# Patient Record
Sex: FEMALE | Race: WHITE
[De-identification: names, ages, dates, MRNs, and addresses within clinical notes are randomized per-mention and may not be internally consistent; named-entity substitution may affect disease eponyms.]

---

## 2017-02-01 ENCOUNTER — RECORD ABSTRACTING (OUTPATIENT)
Age: 67
End: 2017-02-01

## 2017-02-01 DIAGNOSIS — M79.7 FIBROMYALGIA: ICD-10-CM

## 2017-02-01 DIAGNOSIS — Z85.828 PERSONAL HISTORY OF OTHER MALIGNANT NEOPLASM OF SKIN: ICD-10-CM

## 2017-02-01 DIAGNOSIS — Z87.39 PERSONAL HISTORY OF OTHER DISEASES OF THE MUSCULOSKELETAL SYSTEM AND CONNECTIVE TISSUE: ICD-10-CM

## 2017-02-01 DIAGNOSIS — E11.9 TYPE 2 DIABETES MELLITUS W/OUT COMPLICATIONS: ICD-10-CM

## 2017-02-01 DIAGNOSIS — E06.3 AUTOIMMUNE THYROIDITIS: ICD-10-CM

## 2017-02-01 DIAGNOSIS — Z78.9 OTHER SPECIFIED HEALTH STATUS: ICD-10-CM

## 2017-02-01 DIAGNOSIS — Z80.3 FAMILY HISTORY OF MALIGNANT NEOPLASM OF BREAST: ICD-10-CM

## 2017-02-01 DIAGNOSIS — K21.9 GASTRO-ESOPHAGEAL REFLUX DISEASE W/OUT ESOPHAGITIS: ICD-10-CM

## 2017-02-01 DIAGNOSIS — E78.00 PURE HYPERCHOLESTEROLEMIA, UNSPECIFIED: ICD-10-CM

## 2017-03-30 ENCOUNTER — RESULT REVIEW (OUTPATIENT)
Age: 67
End: 2017-03-30

## 2017-03-30 ENCOUNTER — OUTPATIENT (OUTPATIENT)
Dept: OUTPATIENT SERVICES | Facility: HOSPITAL | Age: 67
LOS: 1 days | Discharge: HOME | End: 2017-03-30

## 2017-03-30 ENCOUNTER — APPOINTMENT (OUTPATIENT)
Dept: UROGYNECOLOGY | Facility: CLINIC | Age: 67
End: 2017-03-30

## 2017-03-30 VITALS
BODY MASS INDEX: 36.12 KG/M2 | WEIGHT: 184 LBS | SYSTOLIC BLOOD PRESSURE: 122 MMHG | DIASTOLIC BLOOD PRESSURE: 84 MMHG | HEIGHT: 60 IN

## 2017-03-30 DIAGNOSIS — E04.1 NONTOXIC SINGLE THYROID NODULE: ICD-10-CM

## 2017-03-30 DIAGNOSIS — M81.0 AGE-RELATED OSTEOPOROSIS W/OUT CURRENT PATHOLOGICAL FRACTURE: ICD-10-CM

## 2017-03-30 DIAGNOSIS — R53.83 OTHER FATIGUE: ICD-10-CM

## 2017-03-30 DIAGNOSIS — M80.88XA OTHER OSTEOPOROSIS WITH CURRENT PATHOLOGICAL FRACTURE, VERTEBRA(E), INITIAL ENCOUNTER FOR FRACTURE: ICD-10-CM

## 2017-03-30 DIAGNOSIS — N30.10 INTERSTITIAL CYSTITIS (CHRONIC) W/OUT HEMATURIA: ICD-10-CM

## 2017-03-30 DIAGNOSIS — R06.02 SHORTNESS OF BREATH: ICD-10-CM

## 2017-03-30 DIAGNOSIS — E78.00 PURE HYPERCHOLESTEROLEMIA, UNSPECIFIED: ICD-10-CM

## 2017-03-30 DIAGNOSIS — F32.9 MAJOR DEPRESSIVE DISORDER, SINGLE EPISODE, UNSPECIFIED: ICD-10-CM

## 2017-03-30 DIAGNOSIS — K58.9 IRRITABLE BOWEL SYNDROME W/OUT DIARRHEA: ICD-10-CM

## 2017-04-03 LAB
APPEARANCE UR: CLEAR
BACTERIA UR CULT: NORMAL
BACTERIA URNS QL MICRO: ABNORMAL
BILIRUB UR QL STRIP: NEGATIVE
COLOR UR: YELLOW
GLUCOSE UR STRIP-MCNC: NEGATIVE MG/DL
HGB UR QL STRIP: NEGATIVE
KETONES UR STRIP-MCNC: NEGATIVE MG/DL
NITRITE UR QL STRIP: NEGATIVE
PH UR STRIP: 5.5
PROT UR STRIP-MCNC: ABNORMAL MG/DL
SP GR UR STRIP: 1.02
URINE COMP/EPITH (NORTH): ABNORMAL
UROBILINOGEN UR STRIP-MCNC: 0.2 MG/DL
WBC URNS QL MICRO: ABNORMAL P/HPF
WBC URNS QL MICRO: NEGATIVE

## 2017-04-19 ENCOUNTER — OTHER (OUTPATIENT)
Age: 67
End: 2017-04-19

## 2017-05-09 ENCOUNTER — OUTPATIENT (OUTPATIENT)
Dept: OUTPATIENT SERVICES | Facility: HOSPITAL | Age: 67
LOS: 1 days | Discharge: HOME | End: 2017-05-09

## 2017-05-09 ENCOUNTER — APPOINTMENT (OUTPATIENT)
Dept: UROGYNECOLOGY | Facility: CLINIC | Age: 67
End: 2017-05-09

## 2017-05-09 VITALS — BODY MASS INDEX: 32.22 KG/M2 | WEIGHT: 165 LBS | DIASTOLIC BLOOD PRESSURE: 70 MMHG | SYSTOLIC BLOOD PRESSURE: 122 MMHG

## 2017-05-09 RX ORDER — CONJUGATED ESTROGENS 0.62 MG/G
0.62 CREAM VAGINAL
Qty: 1 | Refills: 0 | Status: DISCONTINUED | COMMUNITY
Start: 2017-03-30 | End: 2017-05-09

## 2017-05-09 RX ORDER — CETIRIZINE HYDROCHLORIDE 10 MG/1
10 CAPSULE, LIQUID FILLED ORAL
Refills: 0 | Status: DISCONTINUED | COMMUNITY
Start: 2017-03-30 | End: 2017-05-09

## 2017-05-19 ENCOUNTER — OUTPATIENT (OUTPATIENT)
Dept: OUTPATIENT SERVICES | Facility: HOSPITAL | Age: 67
LOS: 1 days | Discharge: HOME | End: 2017-05-19

## 2017-05-19 ENCOUNTER — APPOINTMENT (OUTPATIENT)
Dept: UROGYNECOLOGY | Facility: CLINIC | Age: 67
End: 2017-05-19

## 2017-06-28 DIAGNOSIS — R35.0 FREQUENCY OF MICTURITION: ICD-10-CM

## 2017-06-28 DIAGNOSIS — R10.2 PELVIC AND PERINEAL PAIN: ICD-10-CM

## 2017-06-28 DIAGNOSIS — Z87.440 PERSONAL HISTORY OF URINARY (TRACT) INFECTIONS: ICD-10-CM

## 2017-06-28 DIAGNOSIS — N30.10 INTERSTITIAL CYSTITIS (CHRONIC) WITHOUT HEMATURIA: ICD-10-CM

## 2017-06-28 DIAGNOSIS — N81.10 CYSTOCELE, UNSPECIFIED: ICD-10-CM

## 2017-06-30 DIAGNOSIS — N81.6 RECTOCELE: ICD-10-CM

## 2017-06-30 DIAGNOSIS — N81.84 PELVIC MUSCLE WASTING: ICD-10-CM

## 2017-06-30 DIAGNOSIS — R39.15 URGENCY OF URINATION: ICD-10-CM

## 2017-06-30 DIAGNOSIS — N81.10 CYSTOCELE, UNSPECIFIED: ICD-10-CM

## 2017-06-30 DIAGNOSIS — N32.89 OTHER SPECIFIED DISORDERS OF BLADDER: ICD-10-CM

## 2017-06-30 DIAGNOSIS — R39.198 OTHER DIFFICULTIES WITH MICTURITION: ICD-10-CM

## 2017-06-30 DIAGNOSIS — R30.9 PAINFUL MICTURITION, UNSPECIFIED: ICD-10-CM

## 2017-07-05 DIAGNOSIS — K59.00 CONSTIPATION, UNSPECIFIED: ICD-10-CM

## 2017-07-05 DIAGNOSIS — N81.10 CYSTOCELE, UNSPECIFIED: ICD-10-CM

## 2017-07-05 DIAGNOSIS — N81.6 RECTOCELE: ICD-10-CM

## 2017-07-05 DIAGNOSIS — R35.0 FREQUENCY OF MICTURITION: ICD-10-CM

## 2020-07-31 ENCOUNTER — APPOINTMENT (OUTPATIENT)
Dept: UROGYNECOLOGY | Facility: CLINIC | Age: 70
End: 2020-07-31
Payer: MEDICARE

## 2020-07-31 ENCOUNTER — OUTPATIENT (OUTPATIENT)
Dept: OUTPATIENT SERVICES | Facility: HOSPITAL | Age: 70
LOS: 1 days | Discharge: HOME | End: 2020-07-31

## 2020-07-31 VITALS — WEIGHT: 177 LBS | BODY MASS INDEX: 34.57 KG/M2 | SYSTOLIC BLOOD PRESSURE: 116 MMHG | DIASTOLIC BLOOD PRESSURE: 68 MMHG

## 2020-07-31 DIAGNOSIS — Z87.440 PERSONAL HISTORY OF URINARY (TRACT) INFECTIONS: ICD-10-CM

## 2020-07-31 DIAGNOSIS — N81.6 RECTOCELE: ICD-10-CM

## 2020-07-31 DIAGNOSIS — T81.30XA DISRUPTION OF WOUND, UNSPECIFIED, INITIAL ENCOUNTER: ICD-10-CM

## 2020-07-31 DIAGNOSIS — N95.2 POSTMENOPAUSAL ATROPHIC VAGINITIS: ICD-10-CM

## 2020-07-31 DIAGNOSIS — N81.9 FEMALE GENITAL PROLAPSE, UNSPECIFIED: ICD-10-CM

## 2020-07-31 DIAGNOSIS — C44.1122 BASAL CELL CARCINOMA OF SKIN OF RIGHT LOWER EYELID, INCLUDING CANTHUS: ICD-10-CM

## 2020-07-31 DIAGNOSIS — Z87.448 PERSONAL HISTORY OF OTHER DISEASES OF URINARY SYSTEM: ICD-10-CM

## 2020-07-31 DIAGNOSIS — R42 DIZZINESS AND GIDDINESS: ICD-10-CM

## 2020-07-31 DIAGNOSIS — C43.9 MALIGNANT MELANOMA OF SKIN, UNSPECIFIED: ICD-10-CM

## 2020-07-31 DIAGNOSIS — C44.91 BASAL CELL CARCINOMA OF SKIN, UNSPECIFIED: ICD-10-CM

## 2020-07-31 DIAGNOSIS — Z87.42 PERSONAL HISTORY OF OTHER DISEASES OF THE FEMALE GENITAL TRACT: ICD-10-CM

## 2020-07-31 DIAGNOSIS — Z87.898 PERSONAL HISTORY OF OTHER SPECIFIED CONDITIONS: ICD-10-CM

## 2020-07-31 DIAGNOSIS — Z87.19 PERSONAL HISTORY OF OTHER DISEASES OF THE DIGESTIVE SYSTEM: ICD-10-CM

## 2020-07-31 PROCEDURE — 57295 REVISE VAG GRAFT VIA VAGINA: CPT

## 2020-07-31 PROCEDURE — 99204 OFFICE O/P NEW MOD 45 MIN: CPT

## 2020-07-31 RX ORDER — DOCUSATE SODIUM 100 MG/1
100 CAPSULE ORAL
Refills: 0 | Status: DISCONTINUED | COMMUNITY
Start: 2017-03-30 | End: 2020-07-31

## 2020-07-31 RX ORDER — DENOSUMAB 60 MG/ML
60 INJECTION SUBCUTANEOUS
Refills: 0 | Status: ACTIVE | COMMUNITY

## 2020-07-31 RX ORDER — BUPROPION HYDROCHLORIDE 200 MG/1
200 TABLET, FILM COATED ORAL
Refills: 0 | Status: ACTIVE | COMMUNITY

## 2020-07-31 RX ORDER — ESTRADIOL 0.1 MG/G
0.1 CREAM VAGINAL
Qty: 1 | Refills: 3 | Status: ACTIVE | COMMUNITY
Start: 2020-07-31 | End: 1900-01-01

## 2020-07-31 RX ORDER — GABAPENTIN 100 MG/1
100 CAPSULE ORAL
Refills: 0 | Status: ACTIVE | COMMUNITY

## 2020-07-31 RX ORDER — PHENAZOPYRIDINE HYDROCHLORIDE 100 MG/1
100 TABLET ORAL
Qty: 21 | Refills: 0 | Status: DISCONTINUED | COMMUNITY
Start: 2017-05-09 | End: 2020-07-31

## 2020-07-31 RX ORDER — MULTIVIT-MIN/FOLIC/VIT K/LYCOP 400-300MCG
10 MCG TABLET ORAL
Refills: 0 | Status: ACTIVE | COMMUNITY
Start: 2017-03-30

## 2020-07-31 RX ORDER — ASPIRIN 81 MG
81 TABLET, DELAYED RELEASE (ENTERIC COATED) ORAL
Refills: 0 | Status: ACTIVE | COMMUNITY

## 2020-07-31 RX ORDER — HYDROXYCHLOROQUINE SULFATE 200 MG/1
200 TABLET ORAL
Refills: 0 | Status: ACTIVE | COMMUNITY

## 2020-07-31 RX ORDER — LEVOTHYROXINE SODIUM 50 UG/1
50 CAPSULE ORAL
Refills: 0 | Status: ACTIVE | COMMUNITY
Start: 2017-03-30

## 2020-07-31 RX ORDER — FAMOTIDINE 40 MG/1
40 TABLET, FILM COATED ORAL
Refills: 0 | Status: ACTIVE | COMMUNITY

## 2020-07-31 RX ORDER — TELMISARTAN 20 MG/1
TABLET ORAL
Refills: 0 | Status: ACTIVE | COMMUNITY

## 2020-07-31 RX ORDER — CYCLOSPORINE 0.5 MG/ML
0.05 EMULSION OPHTHALMIC
Refills: 0 | Status: DISCONTINUED | COMMUNITY
Start: 2017-03-30 | End: 2020-07-31

## 2020-07-31 RX ORDER — ATORVASTATIN CALCIUM 10 MG/1
10 TABLET, FILM COATED ORAL
Refills: 0 | Status: ACTIVE | COMMUNITY

## 2020-08-01 NOTE — COUNSELING
[FreeTextEntry1] : \par Please call the office if you have an increase of vaginal bleeding or vaginal pain\par \par Apply a pea size amount of the cream to the opening of the vagina every night for two weeks followed by three nights per week\par \par Please call my office if you have any issues with the cost or side effects of the medication.\par \par Schedule 6 week follow up visit with Dr Neil (suture extruding)

## 2020-08-01 NOTE — HISTORY OF PRESENT ILLNESS
[FreeTextEntry1] : \par Pt with pelvic floor dysfunction here for urogynecologic evaluation. She describes: \par Referring provider: Dr Palumbo\par \par Chief PFD: vaginal discharge\par \par Reports having vaginal discharge since February 2020. yellowish discharge. no vaginal pain or bleeding.  The patient reports that she knew about the suture at the cuff for years but it never caused issues. Was examined by primary gyn and was found to have exposure gortex suture at the cuff.\par \par Operative report reviewed:\par 2012: Dr Yousif vaginal hysterectomy/BS0/lysis of adhesions/uterosacral ligament suspension (with Gortex and PDS suture)/cysto\par \par Pelvic organ prolapse: s/p appy, s/p cassi, s/p prolapse surgery listed above, no bulge, denies splinting\par Stress urinary incontinence: denies\par Overactive bladder syndrome: denies\par Voiding dysfunction: no Incomplete bladder emptying, no hesitancy \par Lower urinary tract/vaginal symptoms: no recurrent UTIs per year, no hematuria, no dysuria, no bladder pain \par Fecal incontinence: denies\par Defecatory dysfunction: sausage\par Sexual dysfunction: not active\par Pelvic pain: denies\par Vaginal dryness: denies\par \par Her pelvic floor symptoms are significantly bothersome and negatively impacting her quality of life. \par \par

## 2020-08-01 NOTE — DISCUSSION/SUMMARY
[FreeTextEntry1] : \par Atrophic vaginitis-\par We reviewed the risks, benefits, alternatives and indications of local estrogen therapy and I gave her a handout that covers this information. She does opt to begin this therapy. I have given her a prescription and specific instructions on how to use the estrogen cream, applied with a finger at a low dose for urogenital atrophy.\par \par Extruding suture, first encounter-\par Using scissors , I was able to grasp the suture knot and excise the suture remnant. The patient tolerated the procedure. Silver nitrate was used to obtain  hemostasis. Bleeding precautions provided. Will return in 6 weeks for follow up. The patient voiced understanding and agrees with the plan.

## 2020-09-11 ENCOUNTER — APPOINTMENT (OUTPATIENT)
Dept: UROGYNECOLOGY | Facility: CLINIC | Age: 70
End: 2020-09-11

## 2020-10-23 ENCOUNTER — APPOINTMENT (OUTPATIENT)
Dept: UROGYNECOLOGY | Facility: CLINIC | Age: 70
End: 2020-10-23

## 2023-12-15 ENCOUNTER — INPATIENT (INPATIENT)
Facility: HOSPITAL | Age: 73
LOS: 5 days | Discharge: HOME HEALTH | DRG: 64 | End: 2023-12-21
Attending: GENERAL ACUTE CARE HOSPITAL | Admitting: GENERAL ACUTE CARE HOSPITAL
Payer: MEDICARE

## 2023-12-15 VITALS — DIASTOLIC BLOOD PRESSURE: 78 MMHG | HEART RATE: 116 BPM | SYSTOLIC BLOOD PRESSURE: 144 MMHG

## 2023-12-15 DIAGNOSIS — I63.9 CEREBRAL INFARCTION, UNSPECIFIED: ICD-10-CM

## 2023-12-15 LAB
ALBUMIN SERPL ELPH-MCNC: 3.7 G/DL — SIGNIFICANT CHANGE UP (ref 3.5–5.2)
ALBUMIN SERPL ELPH-MCNC: 3.7 G/DL — SIGNIFICANT CHANGE UP (ref 3.5–5.2)
ALP SERPL-CCNC: 146 U/L — HIGH (ref 30–115)
ALP SERPL-CCNC: 146 U/L — HIGH (ref 30–115)
ALT FLD-CCNC: 13 U/L — SIGNIFICANT CHANGE UP (ref 0–41)
ALT FLD-CCNC: 13 U/L — SIGNIFICANT CHANGE UP (ref 0–41)
ANION GAP SERPL CALC-SCNC: 8 MMOL/L — SIGNIFICANT CHANGE UP (ref 7–14)
ANION GAP SERPL CALC-SCNC: 8 MMOL/L — SIGNIFICANT CHANGE UP (ref 7–14)
AST SERPL-CCNC: 20 U/L — SIGNIFICANT CHANGE UP (ref 0–41)
AST SERPL-CCNC: 20 U/L — SIGNIFICANT CHANGE UP (ref 0–41)
BASOPHILS # BLD AUTO: 0.05 K/UL — SIGNIFICANT CHANGE UP (ref 0–0.2)
BASOPHILS # BLD AUTO: 0.05 K/UL — SIGNIFICANT CHANGE UP (ref 0–0.2)
BASOPHILS NFR BLD AUTO: 0.8 % — SIGNIFICANT CHANGE UP (ref 0–1)
BASOPHILS NFR BLD AUTO: 0.8 % — SIGNIFICANT CHANGE UP (ref 0–1)
BILIRUB SERPL-MCNC: 0.4 MG/DL — SIGNIFICANT CHANGE UP (ref 0.2–1.2)
BILIRUB SERPL-MCNC: 0.4 MG/DL — SIGNIFICANT CHANGE UP (ref 0.2–1.2)
BUN SERPL-MCNC: 25 MG/DL — HIGH (ref 10–20)
BUN SERPL-MCNC: 25 MG/DL — HIGH (ref 10–20)
CALCIUM SERPL-MCNC: 9.6 MG/DL — SIGNIFICANT CHANGE UP (ref 8.4–10.5)
CALCIUM SERPL-MCNC: 9.6 MG/DL — SIGNIFICANT CHANGE UP (ref 8.4–10.5)
CHLORIDE SERPL-SCNC: 105 MMOL/L — SIGNIFICANT CHANGE UP (ref 98–110)
CHLORIDE SERPL-SCNC: 105 MMOL/L — SIGNIFICANT CHANGE UP (ref 98–110)
CO2 SERPL-SCNC: 25 MMOL/L — SIGNIFICANT CHANGE UP (ref 17–32)
CO2 SERPL-SCNC: 25 MMOL/L — SIGNIFICANT CHANGE UP (ref 17–32)
CREAT SERPL-MCNC: 1.9 MG/DL — HIGH (ref 0.7–1.5)
CREAT SERPL-MCNC: 1.9 MG/DL — HIGH (ref 0.7–1.5)
EGFR: 27 ML/MIN/1.73M2 — LOW
EGFR: 27 ML/MIN/1.73M2 — LOW
EOSINOPHIL # BLD AUTO: 0.36 K/UL — SIGNIFICANT CHANGE UP (ref 0–0.7)
EOSINOPHIL # BLD AUTO: 0.36 K/UL — SIGNIFICANT CHANGE UP (ref 0–0.7)
EOSINOPHIL NFR BLD AUTO: 5.8 % — SIGNIFICANT CHANGE UP (ref 0–8)
EOSINOPHIL NFR BLD AUTO: 5.8 % — SIGNIFICANT CHANGE UP (ref 0–8)
FLUAV AG NPH QL: SIGNIFICANT CHANGE UP
FLUAV AG NPH QL: SIGNIFICANT CHANGE UP
FLUBV AG NPH QL: SIGNIFICANT CHANGE UP
FLUBV AG NPH QL: SIGNIFICANT CHANGE UP
GLUCOSE SERPL-MCNC: 121 MG/DL — HIGH (ref 70–99)
GLUCOSE SERPL-MCNC: 121 MG/DL — HIGH (ref 70–99)
HCT VFR BLD CALC: 32.2 % — LOW (ref 37–47)
HCT VFR BLD CALC: 32.2 % — LOW (ref 37–47)
HGB BLD-MCNC: 10.5 G/DL — LOW (ref 12–16)
HGB BLD-MCNC: 10.5 G/DL — LOW (ref 12–16)
IMM GRANULOCYTES NFR BLD AUTO: 0.2 % — SIGNIFICANT CHANGE UP (ref 0.1–0.3)
IMM GRANULOCYTES NFR BLD AUTO: 0.2 % — SIGNIFICANT CHANGE UP (ref 0.1–0.3)
LYMPHOCYTES # BLD AUTO: 1.86 K/UL — SIGNIFICANT CHANGE UP (ref 1.2–3.4)
LYMPHOCYTES # BLD AUTO: 1.86 K/UL — SIGNIFICANT CHANGE UP (ref 1.2–3.4)
LYMPHOCYTES # BLD AUTO: 29.9 % — SIGNIFICANT CHANGE UP (ref 20.5–51.1)
LYMPHOCYTES # BLD AUTO: 29.9 % — SIGNIFICANT CHANGE UP (ref 20.5–51.1)
MCHC RBC-ENTMCNC: 31.7 PG — HIGH (ref 27–31)
MCHC RBC-ENTMCNC: 31.7 PG — HIGH (ref 27–31)
MCHC RBC-ENTMCNC: 32.6 G/DL — SIGNIFICANT CHANGE UP (ref 32–37)
MCHC RBC-ENTMCNC: 32.6 G/DL — SIGNIFICANT CHANGE UP (ref 32–37)
MCV RBC AUTO: 97.3 FL — SIGNIFICANT CHANGE UP (ref 81–99)
MCV RBC AUTO: 97.3 FL — SIGNIFICANT CHANGE UP (ref 81–99)
MONOCYTES # BLD AUTO: 0.66 K/UL — HIGH (ref 0.1–0.6)
MONOCYTES # BLD AUTO: 0.66 K/UL — HIGH (ref 0.1–0.6)
MONOCYTES NFR BLD AUTO: 10.6 % — HIGH (ref 1.7–9.3)
MONOCYTES NFR BLD AUTO: 10.6 % — HIGH (ref 1.7–9.3)
NEUTROPHILS # BLD AUTO: 3.28 K/UL — SIGNIFICANT CHANGE UP (ref 1.4–6.5)
NEUTROPHILS # BLD AUTO: 3.28 K/UL — SIGNIFICANT CHANGE UP (ref 1.4–6.5)
NEUTROPHILS NFR BLD AUTO: 52.7 % — SIGNIFICANT CHANGE UP (ref 42.2–75.2)
NEUTROPHILS NFR BLD AUTO: 52.7 % — SIGNIFICANT CHANGE UP (ref 42.2–75.2)
NRBC # BLD: 0 /100 WBCS — SIGNIFICANT CHANGE UP (ref 0–0)
NRBC # BLD: 0 /100 WBCS — SIGNIFICANT CHANGE UP (ref 0–0)
PLATELET # BLD AUTO: 189 K/UL — SIGNIFICANT CHANGE UP (ref 130–400)
PLATELET # BLD AUTO: 189 K/UL — SIGNIFICANT CHANGE UP (ref 130–400)
PMV BLD: 10.5 FL — HIGH (ref 7.4–10.4)
PMV BLD: 10.5 FL — HIGH (ref 7.4–10.4)
POTASSIUM SERPL-MCNC: 4.7 MMOL/L — SIGNIFICANT CHANGE UP (ref 3.5–5)
POTASSIUM SERPL-MCNC: 4.7 MMOL/L — SIGNIFICANT CHANGE UP (ref 3.5–5)
POTASSIUM SERPL-SCNC: 4.7 MMOL/L — SIGNIFICANT CHANGE UP (ref 3.5–5)
POTASSIUM SERPL-SCNC: 4.7 MMOL/L — SIGNIFICANT CHANGE UP (ref 3.5–5)
PROT SERPL-MCNC: 6.5 G/DL — SIGNIFICANT CHANGE UP (ref 6–8)
PROT SERPL-MCNC: 6.5 G/DL — SIGNIFICANT CHANGE UP (ref 6–8)
RBC # BLD: 3.31 M/UL — LOW (ref 4.2–5.4)
RBC # BLD: 3.31 M/UL — LOW (ref 4.2–5.4)
RBC # FLD: 12.6 % — SIGNIFICANT CHANGE UP (ref 11.5–14.5)
RBC # FLD: 12.6 % — SIGNIFICANT CHANGE UP (ref 11.5–14.5)
RSV RNA NPH QL NAA+NON-PROBE: SIGNIFICANT CHANGE UP
RSV RNA NPH QL NAA+NON-PROBE: SIGNIFICANT CHANGE UP
SARS-COV-2 RNA SPEC QL NAA+PROBE: DETECTED
SARS-COV-2 RNA SPEC QL NAA+PROBE: DETECTED
SODIUM SERPL-SCNC: 138 MMOL/L — SIGNIFICANT CHANGE UP (ref 135–146)
SODIUM SERPL-SCNC: 138 MMOL/L — SIGNIFICANT CHANGE UP (ref 135–146)
TROPONIN T, HIGH SENSITIVITY RESULT: 32 NG/L — HIGH (ref 6–13)
TROPONIN T, HIGH SENSITIVITY RESULT: 32 NG/L — HIGH (ref 6–13)
TROPONIN T, HIGH SENSITIVITY RESULT: 40 NG/L — HIGH (ref 6–13)
TROPONIN T, HIGH SENSITIVITY RESULT: 40 NG/L — HIGH (ref 6–13)
WBC # BLD: 6.22 K/UL — SIGNIFICANT CHANGE UP (ref 4.8–10.8)
WBC # BLD: 6.22 K/UL — SIGNIFICANT CHANGE UP (ref 4.8–10.8)
WBC # FLD AUTO: 6.22 K/UL — SIGNIFICANT CHANGE UP (ref 4.8–10.8)
WBC # FLD AUTO: 6.22 K/UL — SIGNIFICANT CHANGE UP (ref 4.8–10.8)

## 2023-12-15 PROCEDURE — 36415 COLL VENOUS BLD VENIPUNCTURE: CPT

## 2023-12-15 PROCEDURE — 80053 COMPREHEN METABOLIC PANEL: CPT

## 2023-12-15 PROCEDURE — 0042T: CPT | Mod: MA

## 2023-12-15 PROCEDURE — 92523 SPEECH SOUND LANG COMPREHEN: CPT | Mod: GN

## 2023-12-15 PROCEDURE — 70551 MRI BRAIN STEM W/O DYE: CPT

## 2023-12-15 PROCEDURE — 83036 HEMOGLOBIN GLYCOSYLATED A1C: CPT

## 2023-12-15 PROCEDURE — 99285 EMERGENCY DEPT VISIT HI MDM: CPT | Mod: GC

## 2023-12-15 PROCEDURE — 70498 CT ANGIOGRAPHY NECK: CPT | Mod: 26,MA

## 2023-12-15 PROCEDURE — 70496 CT ANGIOGRAPHY HEAD: CPT | Mod: 26,MA

## 2023-12-15 PROCEDURE — 85025 COMPLETE CBC W/AUTO DIFF WBC: CPT

## 2023-12-15 PROCEDURE — 93005 ELECTROCARDIOGRAM TRACING: CPT

## 2023-12-15 PROCEDURE — 84484 ASSAY OF TROPONIN QUANT: CPT

## 2023-12-15 PROCEDURE — 93010 ELECTROCARDIOGRAM REPORT: CPT | Mod: 76

## 2023-12-15 PROCEDURE — 83735 ASSAY OF MAGNESIUM: CPT

## 2023-12-15 PROCEDURE — 84443 ASSAY THYROID STIM HORMONE: CPT

## 2023-12-15 PROCEDURE — 97530 THERAPEUTIC ACTIVITIES: CPT | Mod: GP

## 2023-12-15 PROCEDURE — 80061 LIPID PANEL: CPT

## 2023-12-15 PROCEDURE — 93306 TTE W/DOPPLER COMPLETE: CPT

## 2023-12-15 PROCEDURE — 85027 COMPLETE CBC AUTOMATED: CPT

## 2023-12-15 PROCEDURE — 97116 GAIT TRAINING THERAPY: CPT | Mod: GP

## 2023-12-15 PROCEDURE — 92610 EVALUATE SWALLOWING FUNCTION: CPT | Mod: GN

## 2023-12-15 PROCEDURE — 71045 X-RAY EXAM CHEST 1 VIEW: CPT | Mod: 26

## 2023-12-15 PROCEDURE — 97167 OT EVAL HIGH COMPLEX 60 MIN: CPT | Mod: GO

## 2023-12-15 PROCEDURE — 0241U: CPT

## 2023-12-15 PROCEDURE — 92526 ORAL FUNCTION THERAPY: CPT | Mod: GN

## 2023-12-15 PROCEDURE — 97163 PT EVAL HIGH COMPLEX 45 MIN: CPT | Mod: GP

## 2023-12-15 PROCEDURE — 70450 CT HEAD/BRAIN W/O DYE: CPT | Mod: 26,MA,XU

## 2023-12-15 PROCEDURE — 82962 GLUCOSE BLOOD TEST: CPT

## 2023-12-15 PROCEDURE — 81001 URINALYSIS AUTO W/SCOPE: CPT

## 2023-12-15 PROCEDURE — 70551 MRI BRAIN STEM W/O DYE: CPT | Mod: 26

## 2023-12-15 PROCEDURE — 86803 HEPATITIS C AB TEST: CPT

## 2023-12-15 RX ORDER — DULOXETINE HYDROCHLORIDE 30 MG/1
1 CAPSULE, DELAYED RELEASE ORAL
Refills: 0 | DISCHARGE

## 2023-12-15 RX ORDER — LANOLIN ALCOHOL/MO/W.PET/CERES
10 CREAM (GRAM) TOPICAL AT BEDTIME
Refills: 0 | Status: DISCONTINUED | OUTPATIENT
Start: 2023-12-15 | End: 2023-12-21

## 2023-12-15 RX ORDER — INSULIN LISPRO 100/ML
VIAL (ML) SUBCUTANEOUS
Refills: 0 | Status: DISCONTINUED | OUTPATIENT
Start: 2023-12-15 | End: 2023-12-21

## 2023-12-15 RX ORDER — POLYETHYLENE GLYCOL 3350 17 G/17G
17 POWDER, FOR SOLUTION ORAL DAILY
Refills: 0 | Status: DISCONTINUED | OUTPATIENT
Start: 2023-12-15 | End: 2023-12-21

## 2023-12-15 RX ORDER — METOPROLOL TARTRATE 50 MG
1 TABLET ORAL
Refills: 0 | DISCHARGE

## 2023-12-15 RX ORDER — GABAPENTIN 400 MG/1
100 CAPSULE ORAL THREE TIMES A DAY
Refills: 0 | Status: DISCONTINUED | OUTPATIENT
Start: 2023-12-15 | End: 2023-12-21

## 2023-12-15 RX ORDER — DULOXETINE HYDROCHLORIDE 30 MG/1
30 CAPSULE, DELAYED RELEASE ORAL DAILY
Refills: 0 | Status: DISCONTINUED | OUTPATIENT
Start: 2023-12-15 | End: 2023-12-21

## 2023-12-15 RX ORDER — PANTOPRAZOLE SODIUM 20 MG/1
40 TABLET, DELAYED RELEASE ORAL
Refills: 0 | Status: DISCONTINUED | OUTPATIENT
Start: 2023-12-15 | End: 2023-12-21

## 2023-12-15 RX ORDER — GLUCAGON INJECTION, SOLUTION 0.5 MG/.1ML
1 INJECTION, SOLUTION SUBCUTANEOUS ONCE
Refills: 0 | Status: DISCONTINUED | OUTPATIENT
Start: 2023-12-15 | End: 2023-12-21

## 2023-12-15 RX ORDER — SODIUM CHLORIDE 9 MG/ML
1000 INJECTION, SOLUTION INTRAVENOUS
Refills: 0 | Status: DISCONTINUED | OUTPATIENT
Start: 2023-12-15 | End: 2023-12-21

## 2023-12-15 RX ORDER — ATORVASTATIN CALCIUM 80 MG/1
80 TABLET, FILM COATED ORAL AT BEDTIME
Refills: 0 | Status: DISCONTINUED | OUTPATIENT
Start: 2023-12-15 | End: 2023-12-21

## 2023-12-15 RX ORDER — SALICYLIC ACID 0.5 %
1 CLEANSER (GRAM) TOPICAL
Refills: 0 | DISCHARGE

## 2023-12-15 RX ORDER — DAPAGLIFLOZIN 10 MG/1
1 TABLET, FILM COATED ORAL
Refills: 0 | DISCHARGE

## 2023-12-15 RX ORDER — ASPIRIN/CALCIUM CARB/MAGNESIUM 324 MG
81 TABLET ORAL DAILY
Refills: 0 | Status: DISCONTINUED | OUTPATIENT
Start: 2023-12-15 | End: 2023-12-21

## 2023-12-15 RX ORDER — LANOLIN ALCOHOL/MO/W.PET/CERES
1 CREAM (GRAM) TOPICAL
Refills: 0 | DISCHARGE

## 2023-12-15 RX ORDER — DEXTROSE 50 % IN WATER 50 %
15 SYRINGE (ML) INTRAVENOUS ONCE
Refills: 0 | Status: DISCONTINUED | OUTPATIENT
Start: 2023-12-15 | End: 2023-12-21

## 2023-12-15 RX ORDER — DEXTROSE 50 % IN WATER 50 %
25 SYRINGE (ML) INTRAVENOUS ONCE
Refills: 0 | Status: DISCONTINUED | OUTPATIENT
Start: 2023-12-15 | End: 2023-12-21

## 2023-12-15 RX ORDER — GABAPENTIN 400 MG/1
1 CAPSULE ORAL
Refills: 0 | DISCHARGE

## 2023-12-15 RX ORDER — GABAPENTIN 400 MG/1
300 CAPSULE ORAL DAILY
Refills: 0 | Status: DISCONTINUED | OUTPATIENT
Start: 2023-12-15 | End: 2023-12-21

## 2023-12-15 RX ORDER — HEPARIN SODIUM 5000 [USP'U]/ML
5000 INJECTION INTRAVENOUS; SUBCUTANEOUS EVERY 12 HOURS
Refills: 0 | Status: DISCONTINUED | OUTPATIENT
Start: 2023-12-15 | End: 2023-12-21

## 2023-12-15 RX ORDER — SENNA PLUS 8.6 MG/1
2 TABLET ORAL AT BEDTIME
Refills: 0 | Status: DISCONTINUED | OUTPATIENT
Start: 2023-12-15 | End: 2023-12-21

## 2023-12-15 RX ORDER — DEXTROSE 50 % IN WATER 50 %
12.5 SYRINGE (ML) INTRAVENOUS ONCE
Refills: 0 | Status: DISCONTINUED | OUTPATIENT
Start: 2023-12-15 | End: 2023-12-21

## 2023-12-15 RX ORDER — PANTOPRAZOLE SODIUM 20 MG/1
1 TABLET, DELAYED RELEASE ORAL
Refills: 0 | DISCHARGE

## 2023-12-15 RX ORDER — ACETAMINOPHEN 500 MG
650 TABLET ORAL EVERY 6 HOURS
Refills: 0 | Status: DISCONTINUED | OUTPATIENT
Start: 2023-12-15 | End: 2023-12-16

## 2023-12-15 RX ADMIN — SENNA PLUS 2 TABLET(S): 8.6 TABLET ORAL at 22:11

## 2023-12-15 RX ADMIN — Medication 10 MILLIGRAM(S): at 22:07

## 2023-12-15 RX ADMIN — GABAPENTIN 100 MILLIGRAM(S): 400 CAPSULE ORAL at 22:10

## 2023-12-15 RX ADMIN — Medication 81 MILLIGRAM(S): at 17:38

## 2023-12-15 RX ADMIN — ATORVASTATIN CALCIUM 80 MILLIGRAM(S): 80 TABLET, FILM COATED ORAL at 22:11

## 2023-12-15 NOTE — ED PROVIDER NOTE - CLINICAL SUMMARY MEDICAL DECISION MAKING FREE TEXT BOX
73Y F with PMhx of CKD, muscle weakness, HTN who presents as stroke code. Presents from [facility] for left sided deficits, unknown LKW. Found on floor this AM at 930AM. ct showed MCA occlusion on M2 segment pt out of window for thrombolytic.. pt admitted to Stroke unit. 73Y F with PMhx of CKD, muscle weakness, HTN who presents as stroke code. Presents from [facility] for left sided deficits, unknown LKW. Found on floor this AM at 930AM. ct showed MCA occlusion on M2 segment pt out of window for thrombolytic.. pt admitted to Stroke unit.

## 2023-12-15 NOTE — CONSULT NOTE ADULT - ASSESSMENT
IMPRESSION: Rehab of CVA / L HP, dysarthria, L facial droop / CKD, HTN    PRECAUTIONS: [   ] Cardiac  [   ] Respiratory  [   ] Seizures [   ] Contact Isolation  [   ] Droplet Isolation  [   ] Other    Weight Bearing Status:     RECOMMENDATION:    Out of Bed to Chair     DVT/Decubiti Prophylaxis    REHAB PLAN:     [  x  ] Bedside P/T 3-5 times a week   [  x  ]   Bedside O/T  2-3 times a week             [   x ] Speech Therapy               [    ]  No Rehab Therapy Indicated   Conditioning/ROM                                    ADL  Bed Mobility                                               Conditioning/ROM  Transfers                                                     Bed Mobility  Sitting /Standing Balance                         Transfers                                        Gait Training                                               Sitting/Standing Balance  Stair Training [   ]Applicable                    Home equipment Eval                                                                        Splinting  [   ] Only      GOALS:   ADL   [ x  ]   Independent                    Transfers  [  x  ] Independent                          Ambulation  [ x   ] Independent     [    x ] With device                            [    ]  CG                                                         [    ]  CG                                                                  [    ] CG                            [    ] Min A                                                   [    ] Min A                                                              [    ] Min  A          DISCHARGE PLAN:   [    ]  Good candidate for Intensive Rehabilitation/Hospital based                                             Will tolerate 3hrs Intensive Rehab Daily                                       [     ]  Short Term Rehab in Skilled Nursing Facility                                       [     ]  Home with Outpatient or  services                                         [ x    ]  Possible Candidate for Intensive Hospital based Rehab - if pt can fully participate in therapy and show functional progress

## 2023-12-15 NOTE — H&P ADULT - NSHPPHYSICALEXAM_GEN_ALL_CORE
Neurological:  - Mental Status: AAOx1; aphasic  - Cranial Nerves II -XII:  II: PERRLA; visual fields are full to confrontation  III, IV, VI: EOMI, no nystagmus appreciated  V: facial sensation intact to light touch V1-V3 b/l  VII:left facial droop  VIII: hearing intact to finger rub b/l  XI: head turning and shoulder shrug intact b/l  XII: tongue protrusion midline  - Motor: 5/5 on right side. 4/5 in LUE, 3/5 in LLE  - Sensory: Intact fine touch, pain, temperature in UE and LE, unable to distinguish b/w right and left side  - Cerebellum: No dysmetria in FTN   - Gait: deferred    NIHSS: 8

## 2023-12-15 NOTE — ED PROVIDER NOTE - ATTENDING CONTRIBUTION TO CARE
I have personally performed a history and physical exam on this patient and personally directed the management of the patient.  73Y F with PMhx of CKD, muscle weakness, HTN who presents as stroke code. Presents from [facility] for left sided deficits, unknown LKW. Found on floor this AM at 930AM.  CON: appears stated age, pleasant, no acute distress, HENMT: normocephalic, atraumatic, anicteric, no conjunctival injection,  CV: regular rhythm, distal pulses intact, RESP: no acute respiratory distress, no stridor, breathing comfortably on RA , GI:  soft, nontender, no rebound, no guarding, SKIN: no wounds MSK: no deformities, NEURO: Language: +aphasia, dysarthria   Cranial nerves: Visual fields full. No nystagmus. Extraocular muscles intact. +Left facial.   Hearing intact   Motor:  Normal bulk and tone. Strength:    5/5 in RUE  4/5 in LUE drifts  5/5 in RLE  4/5 in LLE drifts   strength 5/5  Sensation: Intact to light touch grossly x 4.  Neglect on the left on extinction.   code stroke activated  will send ct imaging neuro consult and reevaluate I have personally performed a history and physical exam on this patient and personally directed the management of the patient.  73Y F with PMhx of CKD, muscle weakness, HTN who presents as stroke code. Presents from [facility] for left sided deficits, unknown LKW. Found on floor this AM at 930AM.  CON: appears stated age, pleasant, no acute distress, HENMT: normocephalic, atraumatic, anicteric, no conjunctival injection,  CV: regular rhythm, distal pulses intact, RESP: no acute respiratory distress, no stridor, breathing comfortably on RA , GI:  soft, nontender, no rebound, no guarding, SKIN: no wounds MSK: no deformities, NEURO: Language: +aphasia, dysarthria   Cranial nerves: Visual fields full. No nystagmus. Extraocular muscles intact. +Left facial.   Hearing intact   Motor:  Normal bulk and tone. Strength:    5/5 in RUE  4/5 in LUE drifts  5/5 in RLE  4/5 in LLE drifts   strength 5/5  Sensation: Intact to light touch grossly x 4.  Neglect on the left on extinction.   code stroke activated  will send ct imaging neuro consult and reevaluate

## 2023-12-15 NOTE — STROKE CODE NOTE - NSMDCONSULT QTN_Y FT
HPI: 73Y F with PMhx of CKD, muscle weakness, HTN presents as a prenotification stroke code from [facility] for left sided deficits. Stroke team called [facility] and LKW was unclear likely yesterday. Patient was found on the floor at 9:30 AM this morning. Patient is at the nursing home for hip fracture and now wheelchair bound.      Imaging:  < from: CT Brain Stroke Protocol (12.15.23 @ 10:59) >    IMPRESSION:    1.  No evidence of acute intracranial hemorrhage or acute large territory   infarct.    2.  Chronic-appearing infarcts within the left parietal lobe and   bilateral cerebellar hemispheres.    Spoke with Joyce Ortega on 12/15/2023 11:02 AM with readback.    --- End of Report ---      < end of copied text >    < from: CT Brain Perfusion Maps Stroke (12.15.23 @ 11:07) >    IMPRESSION:    Short segment occlusion of a right MCA posterior mid M2 branch. Evidence   for corresponding ischemia in the right posterior frontal/parietal lobes:   Tmax >6s vol 29 cc, CBF <30% vol 5 cc; mismatch vol / penumbra 24 cc.    Spoke with Joyce Ortega on 12/15/2023 11:19 AM with readback.    --- End of Report ---    < end of copied text >    Neurologic Exam:  Mental status: Awake, alert and oriented to person, place, not month or year  Language: +aphasia, dysarthria   Cranial nerves: Visual fields full. No nystagmus. Extraocular muscles intact. +Left facial.   Hearing intact   Motor:  Normal bulk and tone. Strength:    5/5 in RUE  4/5 in LUE drifts  5/5 in RLE  4/5 in LLE drifts   strength 5/5  Sensation: Intact to light touch grossly x 4.  Neglect on the left on extinction.   Coordination: No dysmetria on finger-to-nose.    Assessment   73Y F with PMhx of CKD, muscle weakness, HTN presents as a prenotification stroke code from [facility] for left facial, left hemiparesis, dysarthria, aphasia and neglect on the left. CTH showed no acute findings, chronic ischemic strokes. CTA Short segment occlusion of a right MCA posterior mid M2 branch. Evidence for corresponding ischemia in the right posterior frontal/parietal lobes. CTP shows a 24 cc penumbra. Pt was a code NI alert and was deemed not a candidate as MRS 3.    Plan:  Please obtain labs: CBC, CMP, UA, Chest Xray, EKG HPI: 73Y F with PMhx of CKD, muscle weakness, HTN presents as a prenotification stroke code from [facility] for left sided deficits. Stroke team called [facility] and LKW was unclear likely yesterday. Patient was found on the floor at 9:30 AM this morning. Patient is at the nursing home for hip fracture and now wheelchair bound.      Imaging:  < from: CT Brain Stroke Protocol (12.15.23 @ 10:59) >    IMPRESSION:    1.  No evidence of acute intracranial hemorrhage or acute large territory   infarct.    2.  Chronic-appearing infarcts within the left parietal lobe and   bilateral cerebellar hemispheres.    Spoke with Joyce Ortega on 12/15/2023 11:02 AM with readback.    --- End of Report ---      < end of copied text >    < from: CT Brain Perfusion Maps Stroke (12.15.23 @ 11:07) >    IMPRESSION:    Short segment occlusion of a right MCA posterior mid M2 branch. Evidence   for corresponding ischemia in the right posterior frontal/parietal lobes:   Tmax >6s vol 29 cc, CBF <30% vol 5 cc; mismatch vol / penumbra 24 cc.    Spoke with Joyce Ortega on 12/15/2023 11:19 AM with readback.    --- End of Report ---    < end of copied text >    Neurologic Exam:  Mental status: Awake, alert and oriented to person, place, not month or year  Language: +aphasia, dysarthria   Cranial nerves: Visual fields full. No nystagmus. Extraocular muscles intact. +Left facial.   Hearing intact   Motor:  Normal bulk and tone. Strength:    5/5 in RUE  4/5 in LUE drifts  5/5 in RLE  4/5 in LLE drifts   strength 5/5  Sensation: Intact to light touch grossly x 4.  Neglect on the left on extinction.   Coordination: No dysmetria on finger-to-nose.    Assessment   73Y F with PMhx of CKD, muscle weakness, HTN presents as a prenotification stroke code from [facility] for left facial, left hemiparesis, dysarthria, aphasia and neglect on the left. CTH showed no acute findings, chronic ischemic strokes. CTA Short segment occlusion of a right MCA posterior mid M2 branch. Evidence for corresponding ischemia in the right posterior frontal/parietal lobes. CTP shows a 24 cc penumbra. Pt was a code NI alert and was deemed not a candidate as MRS 3.    Plan:  Please obtain labs: CBC, CMP, UA, Chest Xray, EKG HPI: 73Y F with PMhx of CKD, muscle weakness, HTN presents as a prenotification stroke code from [facility] for left sided deficits. Stroke team called [facility] and LKW was unclear likely yesterday. Patient was found on the floor at 9:30 AM this morning. Patient is at the nursing home for hip fracture and now wheelchair bound.      Imaging:  < from: CT Brain Stroke Protocol (12.15.23 @ 10:59) >    IMPRESSION:    1.  No evidence of acute intracranial hemorrhage or acute large territory   infarct.    2.  Chronic-appearing infarcts within the left parietal lobe and   bilateral cerebellar hemispheres.    Spoke with Joyce Ortega on 12/15/2023 11:02 AM with readback.    --- End of Report ---      < end of copied text >    < from: CT Brain Perfusion Maps Stroke (12.15.23 @ 11:07) >    IMPRESSION:    Short segment occlusion of a right MCA posterior mid M2 branch. Evidence   for corresponding ischemia in the right posterior frontal/parietal lobes:   Tmax >6s vol 29 cc, CBF <30% vol 5 cc; mismatch vol / penumbra 24 cc.    Spoke with Joyce Ortega on 12/15/2023 11:19 AM with readback.    --- End of Report ---    < end of copied text >    Neurologic Exam:  Mental status: Awake, alert and oriented to person, place, not month or year  Language: +aphasia, dysarthria   Cranial nerves: Visual fields full. No nystagmus. Extraocular muscles intact. +Left facial.   Hearing intact   Motor:  Normal bulk and tone. Strength:    5/5 in RUE  4/5 in LUE drifts  5/5 in RLE  4/5 in LLE drifts   strength 5/5  Sensation: Intact to light touch grossly x 4.  Neglect on the left on extinction.   Coordination: No dysmetria on finger-to-nose.    Assessment   73Y F with PMhx of CKD, muscle weakness, HTN presents as a prenotification stroke code from [facility] for left facial, left hemiparesis, dysarthria, aphasia and neglect on the left. CTH showed no acute findings, chronic ischemic strokes. CTA Short segment occlusion of a right MCA posterior mid M2 branch. Evidence for corresponding ischemia in the right posterior frontal/parietal lobes. CTP shows a 24 cc penumbra. Pt was a code NI alert and was deemed not a candidate as MRS 3.    Plan:  Please obtain labs: CBC, CMP, UA, Chest Xray, EKG  -Admit to stroke unit under Dr. Mock  -Permissive HTN up to 220/120  -Dysphagia screen, ASA 81 mg if passes. If failed dysphagia, then  mg suppository.   -Avoid hypotension, hypovolemia  -MRI B non contrast   -TTE study  -Labs: A1c, lipid panel, TSH  -PT/OT/ST  -Signed out to neuro resident, H&P to follow. HPI: 73Y F with PMhx of CKD, muscle weakness, HTN presents as a prenotification stroke code from [facility] for left sided deficits. Stroke team called [facility] and LKW was unclear likely yesterday. Patient was found on the floor at 9:30 AM this morning. Patient is at the nursing home for hip fracture and now wheelchair bound.      Imaging:  < from: CT Brain Stroke Protocol (12.15.23 @ 10:59) >    IMPRESSION:    1.  No evidence of acute intracranial hemorrhage or acute large territory   infarct.    2.  Chronic-appearing infarcts within the left parietal lobe and   bilateral cerebellar hemispheres.    Spoke with Joyce Ortega on 12/15/2023 11:02 AM with readback.    --- End of Report ---      < end of copied text >    < from: CT Brain Perfusion Maps Stroke (12.15.23 @ 11:07) >    IMPRESSION:    Short segment occlusion of a right MCA posterior mid M2 branch. Evidence   for corresponding ischemia in the right posterior frontal/parietal lobes:   Tmax >6s vol 29 cc, CBF <30% vol 5 cc; mismatch vol / penumbra 24 cc.    Spoke with Joyce Ortega on 12/15/2023 11:19 AM with readback.    --- End of Report ---    < end of copied text >    Neurologic Exam:  Mental status: Awake, alert and oriented to person, place, not month or year  Language: +aphasia, dysarthria   Cranial nerves: Visual fields full. No nystagmus. Extraocular muscles intact. +Left facial.   Hearing intact   Motor:  Normal bulk and tone. Strength:    5/5 in RUE  4/5 in LUE drifts  5/5 in RLE  4/5 in LLE drifts   strength 5/5  Sensation: Intact to light touch grossly x 4.  Neglect on the left on extinction.   Coordination: No dysmetria on finger-to-nose.    Assessment   73Y F with PMhx of CKD, muscle weakness, HTN presents as a prenotification stroke code from [facility] for left facial, left hemiparesis, dysarthria, aphasia and neglect on the left. CTH showed no acute findings, chronic ischemic strokes. CTA Short segment occlusion of a right MCA posterior mid M2 branch. Evidence for corresponding ischemia in the right posterior frontal/parietal lobes. CTP shows a 24 cc penumbra. Pt was a code NI alert and was deemed not a candidate as MRS 3.    Plan:  Please obtain labs: CBC, CMP, UA, Chest Xray, EKG  -Admit to stroke unit under Dr. Mock  -Permissive HTN up to 220/120  -Dysphagia screen, ASA 81 mg if passes. If failed dysphagia, then  mg suppository.   -Avoid hypotension, hypovolemia  -MRI B non contrast   -TTE study  -Labs: A1c, lipid panel, TSH  -PT/OT/ST  -Signed out to neuro resident, H&P to follow.

## 2023-12-15 NOTE — SWALLOW BEDSIDE ASSESSMENT ADULT - SLP PERTINENT HISTORY OF CURRENT PROBLEM
73Y F with PMhx of CKD, muscle weakness, HTN presents as a prenotification stroke code from [facility] for left facial, left hemiparesis, dysarthria, aphasia and neglect on the left. CTH showed no acute findings, chronic ischemic strokes. CTA Short segment occlusion of a right MCA posterior mid M2 branch. Evidence for corresponding ischemia in the right posterior frontal/parietal lobes. CTP shows a 24 cc penumbra. Pt was a code NI alert and was deemed not a candidate as MRS 3. 73Y F with PMhx of CKD, muscle weakness, HTN presents as a prenotification stroke code from [facility] for left facial, left hemiparesis, dysarthria, aphasia and neglect on the left. CTH showed no acute findings, chronic ischemic strokes. CTA Short segment occlusion of a right MCA posterior mid M2 branch. Evidence for corresponding ischemia in the right posterior frontal/parietal lobes. CTP shows a 24 cc penumbra. Pt was a code NI alert and was deemed not a candidate as MRS 3.

## 2023-12-15 NOTE — CONSULT NOTE ADULT - SUBJECTIVE AND OBJECTIVE BOX
HPI: 73Y F with PMhx of CKD, muscle weakness, HTN presents as a prenotification stroke code from [facility] for left facial, left hemiparesis, dysarthria, aphasia and neglect on the left. CTH showed no acute findings, chronic ischemic strokes. CTA Short segment occlusion of a right MCA posterior mid M2 branch. Evidence for corresponding ischemia in the right posterior frontal/parietal lobes. CTP shows a 24 cc penumbra. Pt was a code NI alert and was deemed not a candidate as MRS 3.    < from: CT Brain Stroke Protocol (12.15.23 @ 10:59) >  IMPRESSION:    1.  No evidence of acute intracranial hemorrhage or acute large territory   infarct.    2.  Chronic-appearing infarcts within the left parietal lobe and   bilateral cerebellar hemispheres.    < end of copied text >        PAST MEDICAL & SURGICAL HISTORY:      Hospital Course:    TODAY'S SUBJECTIVE & REVIEW OF SYMPTOMS:     Constitutional WNL   Cardio WNL   Resp WNL   GI WNL  Heme WNL  Endo WNL  Skin WNL  MSK WNL  Neuro left facial droop / slurred speech / left side weakness   Cognitive WNL  Psych WNL      MEDICATIONS  (STANDING):  aspirin enteric coated 81 milliGRAM(s) Oral daily  atorvastatin 80 milliGRAM(s) Oral at bedtime  heparin   Injectable 5000 Unit(s) SubCutaneous every 12 hours    MEDICATIONS  (PRN):      FAMILY HISTORY:      Allergies    Allergy Status Unknown    Intolerances        SOCIAL HISTORY:    [  ] Etoh  [  ] Smoking  [  ] Substance abuse     Home Environment:  [   ] Home Alone  [   ] Lives with Family  [   ] Home Health Aid    Dwelling:  [   ] Apartment  [   ] Private House  [   ] Adult Home  [   ] Skilled Nursing Facility      [  x ] Short Term  [   ] Long Term  [   ] Stairs       Elevator [   ]    FUNCTIONAL STATUS PTA: (Check all that apply)  Ambulation: [    ]Independent    [  x ] Dependent     [   ] Non-Ambulatory  Assistive Device: [   ] SA Cane  [   ]  Q Cane  [  x ] Walker  [   ]  Wheelchair  ADL : [   ] Independent  [ x   ]  Dependent         PHYSICAL EXAM: Awake & Alert  GENERAL: NAD  HEAD:  Normocephalic  CHEST/LUNG: Clear   HEART: S1S2+  ABDOMEN: Soft, Nontender  EXTREMITIES:  no calf tenderness    NERVOUS SYSTEM:  Cranial Nerves 2-12 intact [   ] Abnormal  [ x  ]left facial droop with dysarthria   ROM: WFL all extremities [   ]  Abnormal [x   ]limited left side   Motor Strength: WFL all extremities  [   ]  Abnormal [ x  ]2-3/5 left side, 5/5 right side  Sensation: intact to light touch [ x  ] Abnormal [   ]    FUNCTIONAL STATUS:  Bed Mobility: Independent [   ]  Supervision [   ]  Needs Assistance [ x  ]  N/A [   ]  Transfers: Independent [   ]  Supervision [   ]  Needs Assistance [   ]  N/A [   ]   Ambulation: Independent [   ]  Supervision [   ]  Needs Assistance [   ]  N/A [   ]  ADL: Independent [   ] Requires Assistance [   ] N/A [   ]      LABS:                        10.5   6.22  )-----------( 189      ( 15 Dec 2023 11:07 )             32.2     12-15    138  |  105  |  25<H>  ----------------------------<  121<H>  4.7   |  25  |  1.9<H>    Ca    9.6      15 Dec 2023 11:07    TPro  6.5  /  Alb  3.7  /  TBili  0.4  /  DBili  x   /  AST  20  /  ALT  13  /  AlkPhos  146<H>  12-15      Urinalysis Basic - ( 15 Dec 2023 11:07 )    Color: x / Appearance: x / SG: x / pH: x  Gluc: 121 mg/dL / Ketone: x  / Bili: x / Urobili: x   Blood: x / Protein: x / Nitrite: x   Leuk Esterase: x / RBC: x / WBC x   Sq Epi: x / Non Sq Epi: x / Bacteria: x        RADIOLOGY & ADDITIONAL STUDIES:   HPI: 73Y F with PMhx of CKD, muscle weakness, HTN presents as a prenotification stroke code from [facility] for left facial, left hemiparesis, dysarthria, aphasia and neglect on the left. CTH showed no acute findings, chronic ischemic strokes. CTA Short segment occlusion of a right MCA posterior mid M2 branch. Evidence for corresponding ischemia in the right posterior frontal/parietal lobes. CTP shows a 24 cc penumbra. Pt was a code NI alert and was deemed not a candidate as MRS 3.    < from: CT Brain Stroke Protocol (12.15.23 @ 10:59) >  IMPRESSION:    1.  No evidence of acute intracranial hemorrhage or acute large territory   infarct.    2.  Chronic-appearing infarcts within the left parietal lobe and   bilateral cerebellar hemispheres.    < end of copied text >        PAST MEDICAL & SURGICAL HISTORY:      Hospital Course:    TODAY'S SUBJECTIVE & REVIEW OF SYMPTOMS:     Constitutional WNL   Cardio WNL   Resp WNL   GI WNL  Heme WNL  Endo WNL  Skin WNL  MSK WNL  Neuro left facial droop / slurred speech / left side weakness   Cognitive WNL  Psych WNL      MEDICATIONS  (STANDING):  aspirin enteric coated 81 milliGRAM(s) Oral daily  atorvastatin 80 milliGRAM(s) Oral at bedtime  heparin   Injectable 5000 Unit(s) SubCutaneous every 12 hours    MEDICATIONS  (PRN):      FAMILY HISTORY:      Allergies    Allergy Status Unknown    Intolerances        SOCIAL HISTORY:    [  ] Etoh  [  ] Smoking  [  ] Substance abuse     Home Environment:  [   ] Home Alone  [   ] Lives with Family  [   ] Home Health Aid    Dwelling:  [   ] Apartment  [   ] Private House  [   ] Adult Home  [   ] Skilled Nursing Facility      [  x ] Short Term  [   ] Long Term  [   ] Stairs       Elevator [   ]    FUNCTIONAL STATUS PTA: (Check all that apply)  Ambulation: [    ]Independent    [  x ] Dependent     [   ] Non-Ambulatory  Assistive Device: [   ] SA Cane  [   ]  Q Cane  [  x ] Walker  [   ]  Wheelchair  ADL : [   ] Independent  [ x   ]  Dependent         PHYSICAL EXAM: Awake & Alert  GENERAL: NAD  HEAD:  Normocephalic  CHEST/LUNG: Clear   HEART: S1S2+  ABDOMEN: Soft, Nontender  EXTREMITIES:  no calf tenderness    NERVOUS SYSTEM:  Cranial Nerves 2-12 intact [   ] Abnormal  [ x  ]left facial droop with dysarthria   ROM: WFL all extremities [   ]  Abnormal [x   ]limited left side   Motor Strength: WFL all extremities  [   ]  Abnormal [ x  ]2-3/5 left side, 5/5 right side  Sensation: intact to light touch [ x  ] Abnormal [   ]    FUNCTIONAL STATUS:  Bed Mobility: Independent [   ]  Supervision [   ]  Needs Assistance [ x  ]  N/A [   ]  Transfers: Independent [   ]  Supervision [   ]  Needs Assistance [   ]  N/A [   ]   Ambulation: Independent [   ]  Supervision [   ]  Needs Assistance [   ]  N/A [   ]  ADL: Independent [   ] Requires Assistance [   ] N/A [   ]      LABS:                        10.5   6.22  )-----------( 189      ( 15 Dec 2023 11:07 )             32.2     12-15    138  |  105  |  25<H>  ----------------------------<  121<H>  4.7   |  25  |  1.9<H>    Ca    9.6      15 Dec 2023 11:07    TPro  6.5  /  Alb  3.7  /  TBili  0.4  /  DBili  x   /  AST  20  /  ALT  13  /  AlkPhos  146<H>  12-15      Urinalysis Basic - ( 15 Dec 2023 11:07 )    Color: x / Appearance: x / SG: x / pH: x  Gluc: 121 mg/dL / Ketone: x  / Bili: x / Urobili: x   Blood: x / Protein: x / Nitrite: x   Leuk Esterase: x / RBC: x / WBC x   Sq Epi: x / Non Sq Epi: x / Bacteria: x        RADIOLOGY & ADDITIONAL STUDIES:

## 2023-12-15 NOTE — H&P ADULT - ASSESSMENT
72 yo F with PMHx of DM, HTN, HLD, CKD, Polyneuropathy, Depression, GERD presented from [facility] for left facial, left hemiparesis, dysarthria, aphasia and neglect on the left. LKW unknown, was found in the floor today 930AM. CTH showed no acute findings, chronic ischemic strokes. CTA Short segment occlusion of a right MCA posterior mid M2 branch. Evidence for corresponding ischemia in the right posterior frontal/parietal lobes. CTP shows a 24 cc penumbra. Pt was a code NI alert and was deemed not a candidate as MRS 3. She also tested positive for COVID. Admit to stroke unit for workup.      #Stroke workup  - CT head non con: No acute pathology  - CTA H&N and CTP: Short segment occlusion of a right MCA posterior mid M2 branch. Evidence   for corresponding ischemia in the right posterior frontal/parietal lobes:   Tmax >6s vol 29 cc, CBF <30% vol 5 cc; mismatch vol / penumbra 24 cc.  - C/w Aspirin 81mg  - C/w Atorvastatin 80mg daily  - Order MRI Brain Non-con  - Obtain TTE  - HbA1c, TSH and Lipid panel  - Tele monitoring  - Physical therapy/Occupational therapy/Speech and Swallow/Physiatry eval  - Fall and Aspiration precautions  - Admit to Stroke Unit and Tele monitoring  - Neurochecks q4 hrs  - Provide stroke education    #DM  - insulin SS for now  - monitor FS    #HTN  - hold HTN meds for now  - permissive HTN    #Polyneuropathy  - c/w gabapentin    #Depression  - c/w cymbalta    #CKD  - monitor cr    #GERD  - c/w pantoprazole    #Misc:  - DVT Prophylaxis: heparin  - Diet: NPO until s/s eval  - GI Prophylaxis: pantoprazole  - Code status: Full code  - Dispo: Stroke Unit    Pendin) MRI brain  2) TTE  3) Labs 72 yo F with PMHx of DM, HTN, HLD, CKD, Polyneuropathy, Depression, GERD presented from [facility] for left facial, left hemiparesis, dysarthria, aphasia and neglect on the left. LKW unknown, was found in the floor today 930AM. CTH showed no acute findings, chronic ischemic strokes. CTA Short segment occlusion of a right MCA posterior mid M2 branch. Evidence for corresponding ischemia in the right posterior frontal/parietal lobes. CTP shows a 24 cc penumbra. Pt was a code NI alert and was deemed not a candidate as MRS 3. She also tested positive for COVID. Admit to stroke unit for workup.      #Stroke workup  - CT head non con: No acute pathology  - CTA H&N and CTP: Short segment occlusion of a right MCA posterior mid M2 branch. Evidence   for corresponding ischemia in the right posterior frontal/parietal lobes:   Tmax >6s vol 29 cc, CBF <30% vol 5 cc; mismatch vol / penumbra 24 cc.  - C/w Aspirin 81mg  - C/w Atorvastatin 80mg daily  - Order MRI Brain Non-con  - Obtain TTE  - HbA1c, TSH and Lipid panel  - Tele monitoring  - Physical therapy/Occupational therapy/Speech and Swallow/Physiatry eval  - Fall and Aspiration precautions  - Admit to Stroke Unit and Tele monitoring  - Neurochecks q4 hrs  - Provide stroke education    #DM  - insulin SS for now  - monitor FS    #HTN  - hold HTN meds for now  - permissive HTN    #Polyneuropathy  - c/w gabapentin    #Depression  - c/w cymbalta    #CKD  - monitor cr    #GERD  - c/w pantoprazole    #Misc:  - DVT Prophylaxis: heparin  - Diet: NPO until s/s eval  - GI Prophylaxis: pantoprazole  - Code status: Full code  - Dispo: Stroke Unit    Pendin) MRI brain  2) TTE  3) Labs 74 yo F with PMHx of DM, HTN, HLD, CKD, Polyneuropathy, Depression, GERD presented from [facility] for left facial, left hemiparesis, dysarthria, aphasia and neglect on the left. LKW unknown, was found in the floor today 930AM. CTH showed no acute findings, chronic ischemic strokes. CTA Short segment occlusion of a right MCA posterior mid M2 branch. Evidence for corresponding ischemia in the right posterior frontal/parietal lobes. CTP shows a 24 cc penumbra. Pt was a code NI alert and was deemed not a candidate as MRS 3. She also tested positive for COVID. Admit to stroke unit for workup.      #Stroke workup  - CT head non con: No acute pathology  - CTA H&N and CTP: Short segment occlusion of a right MCA posterior mid M2 branch. Evidence   for corresponding ischemia in the right posterior frontal/parietal lobes:   Tmax >6s vol 29 cc, CBF <30% vol 5 cc; mismatch vol / penumbra 24 cc.  - C/w Aspirin 81mg  - C/w Atorvastatin 80mg daily  - Order MRI Brain Non-con  - Obtain TTE  - HbA1c, TSH and Lipid panel  - Tele monitoring  - Physical therapy/Occupational therapy/Speech and Swallow/Physiatry eval  - Fall and Aspiration precautions  - Admit to Stroke Unit and Tele monitoring  - Neurochecks q4 hrs  - Provide stroke education    #DM  - insulin SS for now  - monitor FS    #HTN  - hold HTN meds for now  - permissive HTN    #Polyneuropathy  - c/w gabapentin    #Depression  - c/w cymbalta    #CKD  - monitor cr  - elevated trops, trend    #COVID pos  - monitor for now    #GERD  - c/w pantoprazole    #Misc:  - DVT Prophylaxis: heparin  - Diet: NPO until s/s eval  - GI Prophylaxis: pantoprazole  - Code status: Full code  - Dispo: Stroke Unit    Pendin) MRI brain  2) TTE  3) Labs 72 yo F with PMHx of DM, HTN, HLD, CKD, Polyneuropathy, Depression, GERD presented from [facility] for left facial, left hemiparesis, dysarthria, aphasia and neglect on the left. LKW unknown, was found in the floor today 930AM. CTH showed no acute findings, chronic ischemic strokes. CTA Short segment occlusion of a right MCA posterior mid M2 branch. Evidence for corresponding ischemia in the right posterior frontal/parietal lobes. CTP shows a 24 cc penumbra. Pt was a code NI alert and was deemed not a candidate as MRS 3. She also tested positive for COVID. Admit to stroke unit for workup.      #Stroke workup  - CT head non con: No acute pathology  - CTA H&N and CTP: Short segment occlusion of a right MCA posterior mid M2 branch. Evidence   for corresponding ischemia in the right posterior frontal/parietal lobes:   Tmax >6s vol 29 cc, CBF <30% vol 5 cc; mismatch vol / penumbra 24 cc.  - C/w Aspirin 81mg  - C/w Atorvastatin 80mg daily  - Order MRI Brain Non-con  - Obtain TTE  - HbA1c, TSH and Lipid panel  - Tele monitoring  - Physical therapy/Occupational therapy/Speech and Swallow/Physiatry eval  - Fall and Aspiration precautions  - Admit to Stroke Unit and Tele monitoring  - Neurochecks q4 hrs  - Provide stroke education    #DM  - insulin SS for now  - monitor FS    #HTN  - hold HTN meds for now  - permissive HTN    #Polyneuropathy  - c/w gabapentin    #Depression  - c/w cymbalta    #CKD  - monitor cr  - elevated trops, trend    #COVID pos  - monitor for now    #GERD  - c/w pantoprazole    #Misc:  - DVT Prophylaxis: heparin  - Diet: NPO until s/s eval  - GI Prophylaxis: pantoprazole  - Code status: Full code  - Dispo: Stroke Unit    Pendin) MRI brain  2) TTE  3) Labs

## 2023-12-15 NOTE — H&P ADULT - HISTORY OF PRESENT ILLNESS
74 yo F with PMHx of DM, HTN, HLD, CKD, Polyneuropathy, Depression, GERD presented from [facility] for left facial, left hemiparesis, dysarthria, aphasia and neglect on the left. LKW unknown, was found in the floor today 930AM. CTH showed no acute findings, chronic ischemic strokes. CTA Short segment occlusion of a right MCA posterior mid M2 branch. Evidence for corresponding ischemia in the right posterior frontal/parietal lobes. CTP shows a 24 cc penumbra. Pt was a code NI alert and was deemed not a candidate as MRS 3. She also tested positive for COVID. Admit to stroke unit for workup. 74 yo F with PMHx of DM, HTN, HLD, CKD, Polyneuropathy, Depression, GERD presented from [facility] for left facial, left hemiparesis, dysarthria, aphasia and neglect on the left. LKW unknown, was found in the floor today 930AM. CTH showed no acute findings, chronic ischemic strokes. CTA Short segment occlusion of a right MCA posterior mid M2 branch. Evidence for corresponding ischemia in the right posterior frontal/parietal lobes. CTP shows a 24 cc penumbra. Pt was a code NI alert and was deemed not a candidate as MRS 3. She also tested positive for COVID. Admit to stroke unit for workup. 74 yo F with PMHx of DM, HTN, HLD, CKD, Polyneuropathy, Depression, GERD presented from [facility] for left facial, left hemiparesis, dysarthria, aphasia and neglect on the left. LKW unknown, was found in the floor today 930AM. CTH showed no acute findings, chronic ischemic strokes. CTA Short segment occlusion of a right MCA posterior mid M2 branch. Evidence for corresponding ischemia in the right posterior frontal/parietal lobes. CTP shows a 24 cc penumbra. Pt was a code NI alert and was deemed not a candidate as MRS 3. She also tested positive for COVID. Trop elevated, likely due to CKD but need to trend. EKG Normal as per ED team. Admit to stroke unit for workup. 72 yo F with PMHx of DM, HTN, HLD, CKD, Polyneuropathy, Depression, GERD presented from [facility] for left facial, left hemiparesis, dysarthria, aphasia and neglect on the left. LKW unknown, was found in the floor today 930AM. CTH showed no acute findings, chronic ischemic strokes. CTA Short segment occlusion of a right MCA posterior mid M2 branch. Evidence for corresponding ischemia in the right posterior frontal/parietal lobes. CTP shows a 24 cc penumbra. Pt was a code NI alert and was deemed not a candidate as MRS 3. She also tested positive for COVID. Trop elevated, likely due to CKD but need to trend. EKG Normal as per ED team. Admit to stroke unit for workup.

## 2023-12-15 NOTE — H&P ADULT - NSICDXPASTMEDICALHX_GEN_ALL_CORE_FT
PAST MEDICAL HISTORY:  Chronic kidney disease, unspecified CKD stage     Depression, major     DM (diabetes mellitus)     HLD (hyperlipidemia)     Polyneuropathy

## 2023-12-15 NOTE — SWALLOW BEDSIDE ASSESSMENT ADULT - SWALLOW EVAL: RECOMMENDED FEEDING/EATING TECHNIQUES
alternate food with liquid/check mouth frequently for oral residue/pocketing/hard swallow w/ each bite or sip/maintain upright posture during/after eating for 30 mins/oral hygiene/position upright (90 degrees)/small sips/bites

## 2023-12-15 NOTE — CHART NOTE - NSCHARTNOTEFT_GEN_A_CORE
Neurovascular:  Discussed with [facility] YASME[facility] Crespo.   Patient was sleeping at 0715 when assessed, sleeping at 0845 and found on the floor at 0930.  Patients last known well unclear, not a candidate for IV thrombolytics.   See neurovascular note for recommendations

## 2023-12-15 NOTE — ED PROVIDER NOTE - OBJECTIVE STATEMENT
73Y F with PMhx of CKD, muscle weakness, HTN who presents as stroke code. Presents from [facility] for left sided deficits, unknown LKW. Found on floor this AM at 930AM. 73Y F with PMhx of CKD, muscle weakness, HTN who presents as stroke code. Presents from [facility] for left sided deficits, unknown LKW. Found on floor this AM at 930AM.

## 2023-12-15 NOTE — H&P ADULT - NSHPLABSRESULTS_GEN_ALL_CORE
10.5   6.22  )-----------( 189      ( 15 Dec 2023 11:07 )             32.2   12-15    138  |  105  |  25<H>  ----------------------------<  121<H>  4.7   |  25  |  1.9<H>    Ca    9.6      15 Dec 2023 11:07    TPro  6.5  /  Alb  3.7  /  TBili  0.4  /  DBili  x   /  AST  20  /  ALT  13  /  AlkPhos  146<H>  12-15      Radiology    ACC: [id number] EXAM:  CT BRAIN STROKE PROTOCOL   ORDERED BY: JAZ MARIO     PROCEDURE DATE:  12/15/2023          INTERPRETATION:  Clinical History / Reason for exam: Stroke code:  left   arm weakness, dysarthria  IMPRESSION:    1.  No evidence of acute intracranial hemorrhage or acute large territory   infarct.    2.  Chronic-appearing infarcts within the left parietal lobe and   bilateral cerebellar hemispheres.    Spoke with Joyce Ortega on 12/15/2023 11:02 AM with readback.    --- End of Report ---            WILLY CARTER MD; Attending Radiologist  This document has been electronically signed. Dec 15 2023 11:05AM      ACC: [id number] EXAM:  CT ANGIO BRAIN STROKE PROTC IC   ORDERED BY: JAZ MARIO     ACC: [id number] EXAM:  CT ANGIO NECK STROKE PROTCL IC   ORDERED BY: JAZ MARIO     ACC: [id number] EXAM:  CT BRAIN PERFUSION MAPS STROKE   ORDERED BY: JAZ MARIO     PROCEDURE DATE:  12/15/2023        IMPRESSION:    Short segment occlusion of a right MCA posterior mid M2 branch. Evidence   for corresponding ischemia in the right posterior frontal/parietal lobes:   Tmax >6s vol 29 cc, CBF <30% vol 5 cc; mismatch vol / penumbra 24 cc.    Spoke with Joyce Ortega on 12/15/2023 11:19 AM with readback.    --- End of Report ---            WILLY CARTER MD; Attending Radiologist  This document has been electronically signed. Dec 15 2023 11:32AM 10.5   6.22  )-----------( 189      ( 15 Dec 2023 11:07 )             32.2   12-15    138  |  105  |  25<H>  ----------------------------<  121<H>  4.7   |  25  |  1.9<H>    Ca    9.6      15 Dec 2023 11:07    TPro  6.5  /  Alb  3.7  /  TBili  0.4  /  DBili  x   /  AST  20  /  ALT  13  /  AlkPhos  146<H>  12-15      Radiology    ACC: [id number] EXAM:  CT BRAIN STROKE PROTOCOL   ORDERED BY: JAZ MARIO     PROCEDURE DATE:  12/15/2023          INTERPRETATION:  Clinical History / Reason for exam: Stroke code:  left   arm weakness, dysarthria  IMPRESSION:    1.  No evidence of acute intracranial hemorrhage or acute large territory   infarct.    2.  Chronic-appearing infarcts within the left parietal lobe and   bilateral cerebellar hemispheres.    Spoke with Joyce Ortega on 12/15/2023 11:02 AM with readback.    --- End of Report ---            WILLY CARTER MD; Attending Radiologist  This document has been electronically signed. Dec 15 2023 11:05AM      ACC: [id number] EXAM:  CT ANGIO BRAIN STROKE PROTC IC   ORDERED BY: JAZ MARIO     ACC: [id number] EXAM:  CT ANGIO NECK STROKE PROTCL IC   ORDERED BY: JAZ MARIO     ACC: [id number] EXAM:  CT BRAIN PERFUSION MAPS STROKE   ORDERED BY: JAZ MARIO     PROCEDURE DATE:  12/15/2023        IMPRESSION:    Short segment occlusion of a right MCA posterior mid M2 branch. Evidence   for corresponding ischemia in the right posterior frontal/parietal lobes:   Tmax >6s vol 29 cc, CBF <30% vol 5 cc; mismatch vol / penumbra 24 cc.    Spoke with Joyce Ortega on 12/15/2023 11:19 AM with readback.    --- End of Report ---            WILLY CARTER MD; Attending Radiologist  This document has been electronically signed. Dec 15 2023 11:32AM

## 2023-12-15 NOTE — CONSULT NOTE ADULT - ASSESSMENT
74 yo F with PMHx of DM, HTN, HLD, CKD, Polyneuropathy, Depression, GERD presented from [facility] for left facial, left hemiparesis, dysarthria, aphasia and neglect on the left. LKW unknown, was found in the floor today 930AM. CTH showed no acute findings, chronic ischemic strokes. CTA Short segment occlusion of a right MCA posterior mid M2 branch. Evidence for corresponding ischemia in the right posterior frontal/parietal lobes. CTP shows a 24 cc penumbra. Pt was a code NI alert and was deemed not a candidate as MRS 3. She also tested positive for COVID. Trop elevated, likely due to CKD but need to trend. EKG Normal as per ED team. Admit to stroke unit for workup    Cardiology consulted for elevated trop in the setting of CKD and recent COVID infection. patient denies any active chest pain, no sob, no palpitations, no LOC    # Impression:  - Type 2 MI in the setting of CKD and COVID infection     # Recs:  - no need to trend trop   - cw DAPT and atorvastatin as per neuro team   - EKG with NSR, sinus tachycardia with no ischemic changes   - check TTE   - treat underlying infection  74 yo F with PMHx of DM, HTN, HLD, CKD, Polyneuropathy, Depression, GERD presented from [facility] for left facial, left hemiparesis, dysarthria, aphasia and neglect on the left. LKW unknown, was found in the floor today 930AM. CTH showed no acute findings, chronic ischemic strokes. CTA Short segment occlusion of a right MCA posterior mid M2 branch. Evidence for corresponding ischemia in the right posterior frontal/parietal lobes. CTP shows a 24 cc penumbra. Pt was a code NI alert and was deemed not a candidate as MRS 3. She also tested positive for COVID. Trop elevated, likely due to CKD but need to trend. EKG Normal as per ED team. Admit to stroke unit for workup    Cardiology consulted for elevated trop in the setting of CKD and recent COVID infection. patient denies any active chest pain, no sob, no palpitations, no LOC    # Impression:  - Type 2 MI in the setting of CKD and COVID infection     # Recs:  - no need to trend trop   - cw DAPT and atorvastatin as per neuro team   - EKG with NSR, sinus tachycardia with no ischemic changes   - check TTE   - treat underlying infection

## 2023-12-15 NOTE — CONSULT NOTE ADULT - SUBJECTIVE AND OBJECTIVE BOX
HPI:  72 yo F with PMHx of DM, HTN, HLD, CKD, Polyneuropathy, Depression, GERD presented from [facility] for left facial, left hemiparesis, dysarthria, aphasia and neglect on the left. LKW unknown, was found in the floor today 930AM. CTH showed no acute findings, chronic ischemic strokes. CTA Short segment occlusion of a right MCA posterior mid M2 branch. Evidence for corresponding ischemia in the right posterior frontal/parietal lobes. CTP shows a 24 cc penumbra. Pt was a code NI alert and was deemed not a candidate as MRS 3. She also tested positive for COVID. Trop elevated, likely due to CKD but need to trend. EKG Normal as per ED team. Admit to stroke unit for workup. (15 Dec 2023 17:12)      PAST MEDICAL & SURGICAL HISTORY  DM (diabetes mellitus)    HLD (hyperlipidemia)    Depression, major    Chronic kidney disease, unspecified CKD stage    Polyneuropathy        FAMILY HISTORY:  FAMILY HISTORY:      SOCIAL HISTORY:  []smoker  []Alcohol  []Drug    ALLERGIES:  penicillin (Unknown)      MEDICATIONS:  MEDICATIONS  (STANDING):  aspirin enteric coated 81 milliGRAM(s) Oral daily  atorvastatin 80 milliGRAM(s) Oral at bedtime  calcium carbonate 1250 mG  + Vitamin D (OsCal 500 + D) 1 Tablet(s) Oral two times a day  dextrose 5%. 1000 milliLiter(s) (100 mL/Hr) IV Continuous <Continuous>  dextrose 5%. 1000 milliLiter(s) (50 mL/Hr) IV Continuous <Continuous>  dextrose 50% Injectable 12.5 Gram(s) IV Push once  dextrose 50% Injectable 25 Gram(s) IV Push once  dextrose 50% Injectable 25 Gram(s) IV Push once  DULoxetine 30 milliGRAM(s) Oral daily  gabapentin 100 milliGRAM(s) Oral three times a day  gabapentin 300 milliGRAM(s) Oral daily  glucagon  Injectable 1 milliGRAM(s) IntraMuscular once  heparin   Injectable 5000 Unit(s) SubCutaneous every 12 hours  insulin lispro (ADMELOG) corrective regimen sliding scale   SubCutaneous three times a day before meals  melatonin 10 milliGRAM(s) Oral at bedtime  pantoprazole    Tablet 40 milliGRAM(s) Oral before breakfast  polyethylene glycol 3350 17 Gram(s) Oral daily  senna 2 Tablet(s) Oral at bedtime    MEDICATIONS  (PRN):  acetaminophen     Tablet .. 650 milliGRAM(s) Oral every 6 hours PRN Mild Pain (1 - 3), Moderate Pain (4 - 6)  dextrose Oral Gel 15 Gram(s) Oral once PRN Blood Glucose LESS THAN 70 milliGRAM(s)/deciliter      HOME MEDICATIONS:  Home Medications:  atorvastatin 40 mg oral tablet: 1 tab(s) orally once a day (at bedtime) (15 Dec 2023 17:07)  calcium-vitamin D 500 mg-5 mcg (200 intl units) oral tablet: 1 tab(s) orally 2 times a day (15 Dec 2023 17:07)  clotrimazole 1% topical cream: Apply topically to affected area 2 times a day (15 Dec 2023 17:07)  Cranberry oral tablet: 450 milligram(s) orally once a day (15 Dec 2023 17:07)  dapagliflozin 10 mg oral tablet: 1 tab(s) orally once a day (15 Dec 2023 17:07)  DULoxetine 30 mg oral delayed release capsule: 1 cap(s) orally once a day (at bedtime) (15 Dec 2023 17:07)  gabapentin 100 mg oral capsule: 1 cap(s) orally 3 times a day (15 Dec 2023 17:07)  gabapentin 300 mg oral tablet: 1 tab(s) orally once a day (15 Dec 2023 17:07)  ibuprofen 400 mg oral tablet: 1 tab(s) orally 2 times a day (15 Dec 2023 17:07)  melatonin 10 mg oral tablet: 1 tab(s) orally once a day (at bedtime) (15 Dec 2023 17:08)  metoprolol succinate 25 mg oral capsule, extended release: 1 cap(s) orally once a day (15 Dec 2023 17:08)  pantoprazole 40 mg oral delayed release tablet: 1 tab(s) orally once a day (15 Dec 2023 17:08)  telmisartan 40 mg oral tablet: 1 tab(s) orally once a day (15 Dec 2023 17:08)  Tylenol 325 mg oral capsule: 1 cap(s) orally 3 times a day as needed for  pain (15 Dec 2023 17:07)  Vitamin A and D topical ointment: Apply topically to affected area once a day (15 Dec 2023 17:08)      VITALS:   T(F): --  HR: 116 (12-15 @ 20:27) (116 - 116)  BP: 144/78 (12-15 @ 20:27) (144/78 - 144/78)  BP(mean): 104 (12-15 @ 20:27) (104 - 104)  RR: --  SpO2: --    I&O's Summary      REVIEW OF SYSTEMS:  CONSTITUTIONAL: No weakness, fevers or chills  EYES: No visual changes  ENT: No vertigo or throat pain   NECK: No pain or stiffness  RESPIRATORY: No cough, wheezing, hemoptysis; No shortness of breath  CARDIOVASCULAR: No chest pain or palpitations  GASTROINTESTINAL: No abdominal or epigastric pain. No nausea, vomiting, or hematemesis; No diarrhea or constipation. No melena or hematochezia.  GENITOURINARY: No dysuria, frequency or hematuria  NEUROLOGICAL: No numbness or weakness  SKIN: No itching, no rashes  MSK: No pain    PHYSICAL EXAM:  NEURO: patient is awake , alert and oriented  GEN: Not in acute distress  NECK: no thyroid enlargement, no JVD  LUNGS: Clear to auscultation bilaterally   CARDIOVASCULAR: S1/S2 present, RRR , no murmurs or rubs, no carotid bruits,  + PP bilaterally  ABD: Soft, non-tender, non-distended, +BS  EXT: No PAULY  SKIN: Intact    LABS:                        10.5   6.22  )-----------( 189      ( 15 Dec 2023 11:07 )             32.2     12-15    138  |  105  |  25<H>  ----------------------------<  121<H>  4.7   |  25  |  1.9<H>    Ca    9.6      15 Dec 2023 11:07    TPro  6.5  /  Alb  3.7  /  TBili  0.4  /  DBili  x   /  AST  20  /  ALT  13  /  AlkPhos  146<H>  12-15              Troponin trend:      1st set trop 32  2nd set trop 40      RADIOLOGY:  -CXR:  -TTE:  -CCTA:  -STRESS TEST:  -CATHETERIZATION:    ECG: Sinus tachycardia    TELEMETRY EVENTS:   HPI:  72 yo F with PMHx of DM, HTN, HLD, CKD, Polyneuropathy, Depression, GERD presented from [facility] for left facial, left hemiparesis, dysarthria, aphasia and neglect on the left. LKW unknown, was found in the floor today 930AM. CTH showed no acute findings, chronic ischemic strokes. CTA Short segment occlusion of a right MCA posterior mid M2 branch. Evidence for corresponding ischemia in the right posterior frontal/parietal lobes. CTP shows a 24 cc penumbra. Pt was a code NI alert and was deemed not a candidate as MRS 3. She also tested positive for COVID. Trop elevated, likely due to CKD but need to trend. EKG Normal as per ED team. Admit to stroke unit for workup. (15 Dec 2023 17:12)      PAST MEDICAL & SURGICAL HISTORY  DM (diabetes mellitus)    HLD (hyperlipidemia)    Depression, major    Chronic kidney disease, unspecified CKD stage    Polyneuropathy        FAMILY HISTORY:  FAMILY HISTORY:      SOCIAL HISTORY:  []smoker  []Alcohol  []Drug    ALLERGIES:  penicillin (Unknown)      MEDICATIONS:  MEDICATIONS  (STANDING):  aspirin enteric coated 81 milliGRAM(s) Oral daily  atorvastatin 80 milliGRAM(s) Oral at bedtime  calcium carbonate 1250 mG  + Vitamin D (OsCal 500 + D) 1 Tablet(s) Oral two times a day  dextrose 5%. 1000 milliLiter(s) (100 mL/Hr) IV Continuous <Continuous>  dextrose 5%. 1000 milliLiter(s) (50 mL/Hr) IV Continuous <Continuous>  dextrose 50% Injectable 12.5 Gram(s) IV Push once  dextrose 50% Injectable 25 Gram(s) IV Push once  dextrose 50% Injectable 25 Gram(s) IV Push once  DULoxetine 30 milliGRAM(s) Oral daily  gabapentin 100 milliGRAM(s) Oral three times a day  gabapentin 300 milliGRAM(s) Oral daily  glucagon  Injectable 1 milliGRAM(s) IntraMuscular once  heparin   Injectable 5000 Unit(s) SubCutaneous every 12 hours  insulin lispro (ADMELOG) corrective regimen sliding scale   SubCutaneous three times a day before meals  melatonin 10 milliGRAM(s) Oral at bedtime  pantoprazole    Tablet 40 milliGRAM(s) Oral before breakfast  polyethylene glycol 3350 17 Gram(s) Oral daily  senna 2 Tablet(s) Oral at bedtime    MEDICATIONS  (PRN):  acetaminophen     Tablet .. 650 milliGRAM(s) Oral every 6 hours PRN Mild Pain (1 - 3), Moderate Pain (4 - 6)  dextrose Oral Gel 15 Gram(s) Oral once PRN Blood Glucose LESS THAN 70 milliGRAM(s)/deciliter      HOME MEDICATIONS:  Home Medications:  atorvastatin 40 mg oral tablet: 1 tab(s) orally once a day (at bedtime) (15 Dec 2023 17:07)  calcium-vitamin D 500 mg-5 mcg (200 intl units) oral tablet: 1 tab(s) orally 2 times a day (15 Dec 2023 17:07)  clotrimazole 1% topical cream: Apply topically to affected area 2 times a day (15 Dec 2023 17:07)  Cranberry oral tablet: 450 milligram(s) orally once a day (15 Dec 2023 17:07)  dapagliflozin 10 mg oral tablet: 1 tab(s) orally once a day (15 Dec 2023 17:07)  DULoxetine 30 mg oral delayed release capsule: 1 cap(s) orally once a day (at bedtime) (15 Dec 2023 17:07)  gabapentin 100 mg oral capsule: 1 cap(s) orally 3 times a day (15 Dec 2023 17:07)  gabapentin 300 mg oral tablet: 1 tab(s) orally once a day (15 Dec 2023 17:07)  ibuprofen 400 mg oral tablet: 1 tab(s) orally 2 times a day (15 Dec 2023 17:07)  melatonin 10 mg oral tablet: 1 tab(s) orally once a day (at bedtime) (15 Dec 2023 17:08)  metoprolol succinate 25 mg oral capsule, extended release: 1 cap(s) orally once a day (15 Dec 2023 17:08)  pantoprazole 40 mg oral delayed release tablet: 1 tab(s) orally once a day (15 Dec 2023 17:08)  telmisartan 40 mg oral tablet: 1 tab(s) orally once a day (15 Dec 2023 17:08)  Tylenol 325 mg oral capsule: 1 cap(s) orally 3 times a day as needed for  pain (15 Dec 2023 17:07)  Vitamin A and D topical ointment: Apply topically to affected area once a day (15 Dec 2023 17:08)      VITALS:   T(F): --  HR: 116 (12-15 @ 20:27) (116 - 116)  BP: 144/78 (12-15 @ 20:27) (144/78 - 144/78)  BP(mean): 104 (12-15 @ 20:27) (104 - 104)  RR: --  SpO2: --    I&O's Summary      REVIEW OF SYSTEMS:  CONSTITUTIONAL: No weakness, fevers or chills  EYES: No visual changes  ENT: No vertigo or throat pain   NECK: No pain or stiffness  RESPIRATORY: No cough, wheezing, hemoptysis; No shortness of breath  CARDIOVASCULAR: No chest pain or palpitations  GASTROINTESTINAL: No abdominal or epigastric pain. No nausea, vomiting, or hematemesis; No diarrhea or constipation. No melena or hematochezia.  GENITOURINARY: No dysuria, frequency or hematuria  NEUROLOGICAL: No numbness or weakness  SKIN: No itching, no rashes  MSK: No pain    PHYSICAL EXAM:  NEURO: patient is awake , alert and oriented  GEN: Not in acute distress  NECK: no thyroid enlargement, no JVD  LUNGS: Clear to auscultation bilaterally   CARDIOVASCULAR: S1/S2 present, RRR , no murmurs or rubs, no carotid bruits,  + PP bilaterally  ABD: Soft, non-tender, non-distended, +BS  EXT: No PAULY  SKIN: Intact    LABS:                        10.5   6.22  )-----------( 189      ( 15 Dec 2023 11:07 )             32.2     12-15    138  |  105  |  25<H>  ----------------------------<  121<H>  4.7   |  25  |  1.9<H>    Ca    9.6      15 Dec 2023 11:07    TPro  6.5  /  Alb  3.7  /  TBili  0.4  /  DBili  x   /  AST  20  /  ALT  13  /  AlkPhos  146<H>  12-15              Troponin trend:      1st set trop 32  2nd set trop 40      RADIOLOGY:  -CXR:  -TTE:  -CCTA:  -STRESS TEST:  -CATHETERIZATION:    ECG: Sinus tachycardia    TELEMETRY EVENTS:

## 2023-12-15 NOTE — ED PROVIDER NOTE - PHYSICAL EXAMINATION
Neurologic Exam:  Mental status: Awake, alert and oriented to person, place, not month or year  Language: +aphasia, dysarthria   Cranial nerves: Visual fields full. No nystagmus. Extraocular muscles intact. +Left facial.   Hearing intact   Motor:  Normal bulk and tone. Strength:    5/5 in RUE  4/5 in LUE drifts  5/5 in RLE  4/5 in LLE drifts   strength 5/5  Sensation: Intact to light touch grossly x 4.  Neglect on the left on extinction.   Coordination: No dysmetria on finger-to-nose.

## 2023-12-15 NOTE — CHART NOTE - NSCHARTNOTEFT_GEN_A_CORE
72 yo F with PMHx of DM, HTN, HLD, CKD, Polyneuropathy, Depression, GERD presented from [facility] for left facial, left hemiparesis, dysarthria, aphasia and neglect on the left. LKW unknown, was found in the floor today 930AM. CTH showed no acute findings, chronic ischemic strokes. CTA Short segment occlusion of a right MCA posterior mid M2 branch. Evidence for corresponding ischemia in the right posterior frontal/parietal lobes. CTP shows a 24 cc penumbra. Pt was a code NI alert and was deemed not a candidate as MRS 3. She also tested positive for COVID. Trop elevated, likely due to CKD but need to trend. EKG Normal as per ED team. Admit to stroke unit for workup.  Code NI cancelled due to high mRS and distal MCA M2/M3 clot with suspected distal showering.     Case discussed with Dr Tracy and Dr Mock.   Care per neurovascular/stroke team.   [id number] 74 yo F with PMHx of DM, HTN, HLD, CKD, Polyneuropathy, Depression, GERD presented from [facility] for left facial, left hemiparesis, dysarthria, aphasia and neglect on the left. LKW unknown, was found in the floor today 930AM. CTH showed no acute findings, chronic ischemic strokes. CTA Short segment occlusion of a right MCA posterior mid M2 branch. Evidence for corresponding ischemia in the right posterior frontal/parietal lobes. CTP shows a 24 cc penumbra. Pt was a code NI alert and was deemed not a candidate as MRS 3. She also tested positive for COVID. Trop elevated, likely due to CKD but need to trend. EKG Normal as per ED team. Admit to stroke unit for workup.  Code NI cancelled due to high mRS and distal MCA M2/M3 clot with suspected distal showering.     Case discussed with Dr Tracy and Dr Mock.   Care per neurovascular/stroke team.   [phone]

## 2023-12-15 NOTE — CONSULT NOTE ADULT - ATTENDING COMMENTS
Patient seen and examined. Pertinent labs, imaging and telemetry reviewed. I agree with the above:     Patient feeling well. She denies SOB, CP.   Patient is currently still being worked up for CVA, pending MRI.   COVID+ with elevated troponin and CKD.   -EKG nonischemic.   -No need to continue to trend troponins.   -Can be elevated in setting of COVID and CKD.   -Continue with management of CVA.   -Check TTE.   -Primary work up per neurology.   -Tele monitoring.     Discussed with patient and fellow.

## 2023-12-15 NOTE — H&P ADULT - ATTENDING COMMENTS
74 y/o woman with hx of HTN, HLD, DM, CKD, hip fx, polyneuropathy, and depression, residing at SNF, with left facial droop, left hemiparesis (improving; 4/5 on my examination), dysarthria, aphasia and left ondina-neglect . LKN unknown. CTH showed no acute bleed. CTA showing distal right M2 occlusion with 24 cc penumbra. mRS 3 at baseline. Given findings and improving exam, deemed not a candidate for endovascular intervention. Etiology likely cardioembolic. Noted to be positive for COVID in ED. Mild elevated troponin likely 2/2 demand ischemia in the setting of stroke and CKD. Trend. Will admit to stroke unit for further workup.    Start ASA for now and obtain MRI brain. Pending infarct size, will consider DAPT. Start high-intensity statin. Obtain 2D ECHO. PT/OT/ST. SQH for DVT ppx. Rest as above.

## 2023-12-16 LAB
A1C WITH ESTIMATED AVERAGE GLUCOSE RESULT: 5.4 % — SIGNIFICANT CHANGE UP (ref 4–5.6)
A1C WITH ESTIMATED AVERAGE GLUCOSE RESULT: 5.4 % — SIGNIFICANT CHANGE UP (ref 4–5.6)
ALBUMIN SERPL ELPH-MCNC: 3.4 G/DL — LOW (ref 3.5–5.2)
ALBUMIN SERPL ELPH-MCNC: 3.4 G/DL — LOW (ref 3.5–5.2)
ALP SERPL-CCNC: 134 U/L — HIGH (ref 30–115)
ALP SERPL-CCNC: 134 U/L — HIGH (ref 30–115)
ALT FLD-CCNC: 11 U/L — SIGNIFICANT CHANGE UP (ref 0–41)
ALT FLD-CCNC: 11 U/L — SIGNIFICANT CHANGE UP (ref 0–41)
ANION GAP SERPL CALC-SCNC: 12 MMOL/L — SIGNIFICANT CHANGE UP (ref 7–14)
ANION GAP SERPL CALC-SCNC: 12 MMOL/L — SIGNIFICANT CHANGE UP (ref 7–14)
APPEARANCE UR: ABNORMAL
APPEARANCE UR: ABNORMAL
AST SERPL-CCNC: 17 U/L — SIGNIFICANT CHANGE UP (ref 0–41)
AST SERPL-CCNC: 17 U/L — SIGNIFICANT CHANGE UP (ref 0–41)
BACTERIA # UR AUTO: ABNORMAL /HPF
BACTERIA # UR AUTO: ABNORMAL /HPF
BASOPHILS # BLD AUTO: 0.05 K/UL — SIGNIFICANT CHANGE UP (ref 0–0.2)
BASOPHILS # BLD AUTO: 0.05 K/UL — SIGNIFICANT CHANGE UP (ref 0–0.2)
BASOPHILS NFR BLD AUTO: 0.8 % — SIGNIFICANT CHANGE UP (ref 0–1)
BASOPHILS NFR BLD AUTO: 0.8 % — SIGNIFICANT CHANGE UP (ref 0–1)
BILIRUB SERPL-MCNC: 0.5 MG/DL — SIGNIFICANT CHANGE UP (ref 0.2–1.2)
BILIRUB SERPL-MCNC: 0.5 MG/DL — SIGNIFICANT CHANGE UP (ref 0.2–1.2)
BILIRUB UR-MCNC: NEGATIVE — SIGNIFICANT CHANGE UP
BILIRUB UR-MCNC: NEGATIVE — SIGNIFICANT CHANGE UP
BUN SERPL-MCNC: 21 MG/DL — HIGH (ref 10–20)
BUN SERPL-MCNC: 21 MG/DL — HIGH (ref 10–20)
CALCIUM SERPL-MCNC: 9.6 MG/DL — SIGNIFICANT CHANGE UP (ref 8.4–10.5)
CALCIUM SERPL-MCNC: 9.6 MG/DL — SIGNIFICANT CHANGE UP (ref 8.4–10.5)
CAST: 0 /LPF — SIGNIFICANT CHANGE UP (ref 0–4)
CAST: 0 /LPF — SIGNIFICANT CHANGE UP (ref 0–4)
CHLORIDE SERPL-SCNC: 106 MMOL/L — SIGNIFICANT CHANGE UP (ref 98–110)
CHLORIDE SERPL-SCNC: 106 MMOL/L — SIGNIFICANT CHANGE UP (ref 98–110)
CHOLEST SERPL-MCNC: 131 MG/DL — SIGNIFICANT CHANGE UP
CHOLEST SERPL-MCNC: 131 MG/DL — SIGNIFICANT CHANGE UP
CO2 SERPL-SCNC: 24 MMOL/L — SIGNIFICANT CHANGE UP (ref 17–32)
CO2 SERPL-SCNC: 24 MMOL/L — SIGNIFICANT CHANGE UP (ref 17–32)
COLOR SPEC: YELLOW — SIGNIFICANT CHANGE UP
COLOR SPEC: YELLOW — SIGNIFICANT CHANGE UP
CREAT SERPL-MCNC: 1.7 MG/DL — HIGH (ref 0.7–1.5)
CREAT SERPL-MCNC: 1.7 MG/DL — HIGH (ref 0.7–1.5)
DIFF PNL FLD: ABNORMAL
DIFF PNL FLD: ABNORMAL
EGFR: 31 ML/MIN/1.73M2 — LOW
EGFR: 31 ML/MIN/1.73M2 — LOW
EOSINOPHIL # BLD AUTO: 0.17 K/UL — SIGNIFICANT CHANGE UP (ref 0–0.7)
EOSINOPHIL # BLD AUTO: 0.17 K/UL — SIGNIFICANT CHANGE UP (ref 0–0.7)
EOSINOPHIL NFR BLD AUTO: 2.8 % — SIGNIFICANT CHANGE UP (ref 0–8)
EOSINOPHIL NFR BLD AUTO: 2.8 % — SIGNIFICANT CHANGE UP (ref 0–8)
ESTIMATED AVERAGE GLUCOSE: 108 MG/DL — SIGNIFICANT CHANGE UP (ref 68–114)
ESTIMATED AVERAGE GLUCOSE: 108 MG/DL — SIGNIFICANT CHANGE UP (ref 68–114)
GLUCOSE SERPL-MCNC: 118 MG/DL — HIGH (ref 70–99)
GLUCOSE SERPL-MCNC: 118 MG/DL — HIGH (ref 70–99)
GLUCOSE UR QL: >=1000 MG/DL
GLUCOSE UR QL: >=1000 MG/DL
HCT VFR BLD CALC: 29.5 % — LOW (ref 37–47)
HCT VFR BLD CALC: 29.5 % — LOW (ref 37–47)
HCV AB S/CO SERPL IA: 0.04 COI — SIGNIFICANT CHANGE UP
HCV AB S/CO SERPL IA: 0.04 COI — SIGNIFICANT CHANGE UP
HCV AB SERPL-IMP: SIGNIFICANT CHANGE UP
HCV AB SERPL-IMP: SIGNIFICANT CHANGE UP
HDLC SERPL-MCNC: 41 MG/DL — LOW
HDLC SERPL-MCNC: 41 MG/DL — LOW
HGB BLD-MCNC: 9.8 G/DL — LOW (ref 12–16)
HGB BLD-MCNC: 9.8 G/DL — LOW (ref 12–16)
IMM GRANULOCYTES NFR BLD AUTO: 0.3 % — SIGNIFICANT CHANGE UP (ref 0.1–0.3)
IMM GRANULOCYTES NFR BLD AUTO: 0.3 % — SIGNIFICANT CHANGE UP (ref 0.1–0.3)
KETONES UR-MCNC: NEGATIVE MG/DL — SIGNIFICANT CHANGE UP
KETONES UR-MCNC: NEGATIVE MG/DL — SIGNIFICANT CHANGE UP
LEUKOCYTE ESTERASE UR-ACNC: ABNORMAL
LEUKOCYTE ESTERASE UR-ACNC: ABNORMAL
LIPID PNL WITH DIRECT LDL SERPL: 70 MG/DL — SIGNIFICANT CHANGE UP
LIPID PNL WITH DIRECT LDL SERPL: 70 MG/DL — SIGNIFICANT CHANGE UP
LYMPHOCYTES # BLD AUTO: 1.76 K/UL — SIGNIFICANT CHANGE UP (ref 1.2–3.4)
LYMPHOCYTES # BLD AUTO: 1.76 K/UL — SIGNIFICANT CHANGE UP (ref 1.2–3.4)
LYMPHOCYTES # BLD AUTO: 28.9 % — SIGNIFICANT CHANGE UP (ref 20.5–51.1)
LYMPHOCYTES # BLD AUTO: 28.9 % — SIGNIFICANT CHANGE UP (ref 20.5–51.1)
MAGNESIUM SERPL-MCNC: 1.7 MG/DL — LOW (ref 1.8–2.4)
MAGNESIUM SERPL-MCNC: 1.7 MG/DL — LOW (ref 1.8–2.4)
MCHC RBC-ENTMCNC: 32 PG — HIGH (ref 27–31)
MCHC RBC-ENTMCNC: 32 PG — HIGH (ref 27–31)
MCHC RBC-ENTMCNC: 33.2 G/DL — SIGNIFICANT CHANGE UP (ref 32–37)
MCHC RBC-ENTMCNC: 33.2 G/DL — SIGNIFICANT CHANGE UP (ref 32–37)
MCV RBC AUTO: 96.4 FL — SIGNIFICANT CHANGE UP (ref 81–99)
MCV RBC AUTO: 96.4 FL — SIGNIFICANT CHANGE UP (ref 81–99)
MONOCYTES # BLD AUTO: 0.68 K/UL — HIGH (ref 0.1–0.6)
MONOCYTES # BLD AUTO: 0.68 K/UL — HIGH (ref 0.1–0.6)
MONOCYTES NFR BLD AUTO: 11.2 % — HIGH (ref 1.7–9.3)
MONOCYTES NFR BLD AUTO: 11.2 % — HIGH (ref 1.7–9.3)
NEUTROPHILS # BLD AUTO: 3.41 K/UL — SIGNIFICANT CHANGE UP (ref 1.4–6.5)
NEUTROPHILS # BLD AUTO: 3.41 K/UL — SIGNIFICANT CHANGE UP (ref 1.4–6.5)
NEUTROPHILS NFR BLD AUTO: 56 % — SIGNIFICANT CHANGE UP (ref 42.2–75.2)
NEUTROPHILS NFR BLD AUTO: 56 % — SIGNIFICANT CHANGE UP (ref 42.2–75.2)
NITRITE UR-MCNC: POSITIVE
NITRITE UR-MCNC: POSITIVE
NON HDL CHOLESTEROL: 90 MG/DL — SIGNIFICANT CHANGE UP
NON HDL CHOLESTEROL: 90 MG/DL — SIGNIFICANT CHANGE UP
NRBC # BLD: 0 /100 WBCS — SIGNIFICANT CHANGE UP (ref 0–0)
NRBC # BLD: 0 /100 WBCS — SIGNIFICANT CHANGE UP (ref 0–0)
PH UR: 5.5 — SIGNIFICANT CHANGE UP (ref 5–8)
PH UR: 5.5 — SIGNIFICANT CHANGE UP (ref 5–8)
PLATELET # BLD AUTO: 185 K/UL — SIGNIFICANT CHANGE UP (ref 130–400)
PLATELET # BLD AUTO: 185 K/UL — SIGNIFICANT CHANGE UP (ref 130–400)
PMV BLD: 10 FL — SIGNIFICANT CHANGE UP (ref 7.4–10.4)
PMV BLD: 10 FL — SIGNIFICANT CHANGE UP (ref 7.4–10.4)
POTASSIUM SERPL-MCNC: 4.4 MMOL/L — SIGNIFICANT CHANGE UP (ref 3.5–5)
POTASSIUM SERPL-MCNC: 4.4 MMOL/L — SIGNIFICANT CHANGE UP (ref 3.5–5)
POTASSIUM SERPL-SCNC: 4.4 MMOL/L — SIGNIFICANT CHANGE UP (ref 3.5–5)
POTASSIUM SERPL-SCNC: 4.4 MMOL/L — SIGNIFICANT CHANGE UP (ref 3.5–5)
PROT SERPL-MCNC: 5.9 G/DL — LOW (ref 6–8)
PROT SERPL-MCNC: 5.9 G/DL — LOW (ref 6–8)
PROT UR-MCNC: 30 MG/DL
PROT UR-MCNC: 30 MG/DL
RBC # BLD: 3.06 M/UL — LOW (ref 4.2–5.4)
RBC # BLD: 3.06 M/UL — LOW (ref 4.2–5.4)
RBC # FLD: 12.8 % — SIGNIFICANT CHANGE UP (ref 11.5–14.5)
RBC # FLD: 12.8 % — SIGNIFICANT CHANGE UP (ref 11.5–14.5)
RBC CASTS # UR COMP ASSIST: 4 /HPF — SIGNIFICANT CHANGE UP (ref 0–4)
RBC CASTS # UR COMP ASSIST: 4 /HPF — SIGNIFICANT CHANGE UP (ref 0–4)
SODIUM SERPL-SCNC: 142 MMOL/L — SIGNIFICANT CHANGE UP (ref 135–146)
SODIUM SERPL-SCNC: 142 MMOL/L — SIGNIFICANT CHANGE UP (ref 135–146)
SP GR SPEC: >1.03 — HIGH (ref 1–1.03)
SP GR SPEC: >1.03 — HIGH (ref 1–1.03)
SQUAMOUS # UR AUTO: 4 /HPF — SIGNIFICANT CHANGE UP (ref 0–5)
SQUAMOUS # UR AUTO: 4 /HPF — SIGNIFICANT CHANGE UP (ref 0–5)
TRIGL SERPL-MCNC: 101 MG/DL — SIGNIFICANT CHANGE UP
TRIGL SERPL-MCNC: 101 MG/DL — SIGNIFICANT CHANGE UP
TSH SERPL-MCNC: 2.84 UIU/ML — SIGNIFICANT CHANGE UP (ref 0.27–4.2)
TSH SERPL-MCNC: 2.84 UIU/ML — SIGNIFICANT CHANGE UP (ref 0.27–4.2)
UROBILINOGEN FLD QL: 0.2 MG/DL — SIGNIFICANT CHANGE UP (ref 0.2–1)
UROBILINOGEN FLD QL: 0.2 MG/DL — SIGNIFICANT CHANGE UP (ref 0.2–1)
WBC # BLD: 6.09 K/UL — SIGNIFICANT CHANGE UP (ref 4.8–10.8)
WBC # BLD: 6.09 K/UL — SIGNIFICANT CHANGE UP (ref 4.8–10.8)
WBC # FLD AUTO: 6.09 K/UL — SIGNIFICANT CHANGE UP (ref 4.8–10.8)
WBC # FLD AUTO: 6.09 K/UL — SIGNIFICANT CHANGE UP (ref 4.8–10.8)
WBC UR QL: 200 /HPF — HIGH (ref 0–5)
WBC UR QL: 200 /HPF — HIGH (ref 0–5)
YEAST-LIKE CELLS: PRESENT
YEAST-LIKE CELLS: PRESENT

## 2023-12-16 PROCEDURE — 99223 1ST HOSP IP/OBS HIGH 75: CPT | Mod: GC

## 2023-12-16 PROCEDURE — 99233 SBSQ HOSP IP/OBS HIGH 50: CPT

## 2023-12-16 RX ORDER — SODIUM CHLORIDE 9 MG/ML
250 INJECTION INTRAMUSCULAR; INTRAVENOUS; SUBCUTANEOUS ONCE
Refills: 0 | Status: COMPLETED | OUTPATIENT
Start: 2023-12-16 | End: 2023-12-16

## 2023-12-16 RX ORDER — ACETAMINOPHEN 500 MG
650 TABLET ORAL EVERY 12 HOURS
Refills: 0 | Status: DISCONTINUED | OUTPATIENT
Start: 2023-12-16 | End: 2023-12-21

## 2023-12-16 RX ORDER — ONDANSETRON 8 MG/1
4 TABLET, FILM COATED ORAL ONCE
Refills: 0 | Status: COMPLETED | OUTPATIENT
Start: 2023-12-16 | End: 2023-12-16

## 2023-12-16 RX ORDER — MAGNESIUM SULFATE 500 MG/ML
2 VIAL (ML) INJECTION ONCE
Refills: 0 | Status: COMPLETED | OUTPATIENT
Start: 2023-12-16 | End: 2023-12-16

## 2023-12-16 RX ADMIN — HEPARIN SODIUM 5000 UNIT(S): 5000 INJECTION INTRAVENOUS; SUBCUTANEOUS at 05:41

## 2023-12-16 RX ADMIN — Medication 1 TABLET(S): at 05:40

## 2023-12-16 RX ADMIN — Medication 25 GRAM(S): at 09:56

## 2023-12-16 RX ADMIN — Medication 650 MILLIGRAM(S): at 22:24

## 2023-12-16 RX ADMIN — GABAPENTIN 300 MILLIGRAM(S): 400 CAPSULE ORAL at 13:38

## 2023-12-16 RX ADMIN — HEPARIN SODIUM 5000 UNIT(S): 5000 INJECTION INTRAVENOUS; SUBCUTANEOUS at 17:30

## 2023-12-16 RX ADMIN — Medication 1 TABLET(S): at 17:31

## 2023-12-16 RX ADMIN — GABAPENTIN 100 MILLIGRAM(S): 400 CAPSULE ORAL at 22:24

## 2023-12-16 RX ADMIN — Medication 1 TABLET(S): at 14:40

## 2023-12-16 RX ADMIN — DULOXETINE HYDROCHLORIDE 30 MILLIGRAM(S): 30 CAPSULE, DELAYED RELEASE ORAL at 13:37

## 2023-12-16 RX ADMIN — Medication 81 MILLIGRAM(S): at 13:38

## 2023-12-16 RX ADMIN — Medication 1 TABLET(S): at 17:30

## 2023-12-16 RX ADMIN — ONDANSETRON 4 MILLIGRAM(S): 8 TABLET, FILM COATED ORAL at 10:08

## 2023-12-16 RX ADMIN — ATORVASTATIN CALCIUM 80 MILLIGRAM(S): 80 TABLET, FILM COATED ORAL at 22:23

## 2023-12-16 RX ADMIN — SENNA PLUS 1 TABLET(S): 8.6 TABLET ORAL at 22:23

## 2023-12-16 RX ADMIN — SODIUM CHLORIDE 250 MILLILITER(S): 9 INJECTION INTRAMUSCULAR; INTRAVENOUS; SUBCUTANEOUS at 06:36

## 2023-12-16 RX ADMIN — POLYETHYLENE GLYCOL 3350 17 GRAM(S): 17 POWDER, FOR SOLUTION ORAL at 14:36

## 2023-12-16 RX ADMIN — PANTOPRAZOLE SODIUM 40 MILLIGRAM(S): 20 TABLET, DELAYED RELEASE ORAL at 05:41

## 2023-12-16 RX ADMIN — GABAPENTIN 100 MILLIGRAM(S): 400 CAPSULE ORAL at 05:41

## 2023-12-16 RX ADMIN — GABAPENTIN 100 MILLIGRAM(S): 400 CAPSULE ORAL at 13:38

## 2023-12-16 NOTE — PROGRESS NOTE ADULT - ASSESSMENT
74 yo F with PMHx of DM, HTN, HLD, CKD, Polyneuropathy, Depression, GERD presented from [facility] for left facial, left hemiparesis, dysarthria, aphasia and neglect on the left. LKW unknown, was found in the floor today 930AM. CTH showed no acute findings, chronic ischemic strokes. CTA Short segment occlusion of a right MCA posterior mid M2 branch. Evidence for corresponding ischemia in the right posterior frontal/parietal lobes. CTP shows a 24 cc penumbra. Pt was a code NI alert and was deemed not a candidate as MRS 3. She also tested positive for COVID. Admit to stroke unit for workup.      #Stroke workup  - CT head non con: No acute pathology  - CTA H&N and CTP: Short segment occlusion of a right MCA posterior mid M2 branch. Evidence   for corresponding ischemia in the right posterior frontal/parietal lobes:   Tmax >6s vol 29 cc, CBF <30% vol 5 cc; mismatch vol / penumbra 24 cc.  - C/w Aspirin 81mg  - C/w Atorvastatin 80mg daily  - Order MRI Brain Non-con  - Obtain TTE  - HbA1c, TSH and Lipid panel  - Tele monitoring  - Physical therapy/Occupational therapy/Speech and Swallow/Physiatry eval  - Fall and Aspiration precautions  - Admit to Stroke Unit and Tele monitoring  - Neurochecks q4 hrs  - Provide stroke education    #DM  - insulin SS for now  - monitor FS    #HTN  - hold HTN meds for now  - permissive HTN    #Polyneuropathy  - c/w gabapentin    #Depression  - c/w cymbalta    #CKD  - monitor cr  - elevated trops, trend    #COVID pos  - monitor for now    #GERD  - c/w pantoprazole    #Misc:  - DVT Prophylaxis: heparin  - Diet: NPO until s/s eval  - GI Prophylaxis: pantoprazole  - Code status: Full code  - Dispo: Stroke Unit    Pendin) MRI brain  2) TTE  3) Labs 72 yo F with PMHx of DM, HTN, HLD, CKD, Polyneuropathy, Depression, GERD presented from [facility] for left facial, left hemiparesis, dysarthria, aphasia and neglect on the left. LKW unknown, was found in the floor today 930AM. CTH showed no acute findings, chronic ischemic strokes. CTA Short segment occlusion of a right MCA posterior mid M2 branch. Evidence for corresponding ischemia in the right posterior frontal/parietal lobes. CTP shows a 24 cc penumbra. Pt was a code NI alert and was deemed not a candidate as MRS 3. She also tested positive for COVID. Admit to stroke unit for workup.      #Stroke workup  - CT head non con: No acute pathology  - CTA H&N and CTP: Short segment occlusion of a right MCA posterior mid M2 branch. Evidence   for corresponding ischemia in the right posterior frontal/parietal lobes:   Tmax >6s vol 29 cc, CBF <30% vol 5 cc; mismatch vol / penumbra 24 cc.  - C/w Aspirin 81mg  - C/w Atorvastatin 80mg daily  - Order MRI Brain Non-con  - Obtain TTE  - HbA1c, TSH and Lipid panel  - Tele monitoring  - Physical therapy/Occupational therapy/Speech and Swallow/Physiatry eval  - Fall and Aspiration precautions  - Admit to Stroke Unit and Tele monitoring  - Neurochecks q4 hrs  - Provide stroke education    #DM  - insulin SS for now  - monitor FS    #HTN  - hold HTN meds for now  - permissive HTN    #Polyneuropathy  - c/w gabapentin    #Depression  - c/w cymbalta    #CKD  - monitor cr  - elevated trops, trend    #COVID pos  - monitor for now    #GERD  - c/w pantoprazole    #Misc:  - DVT Prophylaxis: heparin  - Diet: NPO until s/s eval  - GI Prophylaxis: pantoprazole  - Code status: Full code  - Dispo: Stroke Unit    Pendin) MRI brain  2) TTE  3) Labs 72 yo F with PMHx of DM, HTN, HLD, CKD, Polyneuropathy, Depression, GERD presented from [facility] for left facial, left hemiparesis, dysarthria, aphasia and neglect on the left. LKW unknown, was found in the floor today 930AM. CTH showed no acute findings, chronic ischemic strokes. CTA Short segment occlusion of a right MCA posterior mid M2 branch. Evidence for corresponding ischemia in the right posterior frontal/parietal lobes. CTP shows a 24 cc penumbra. Pt was a code NI alert and was deemed not a candidate as MRS 3. She also tested positive for COVID. Admit to stroke unit for workup.      #Stroke workup  - CT head non con: No acute pathology  - CTA H&N and CTP: Short segment occlusion of a right MCA posterior mid M2 branch. Evidence   for corresponding ischemia in the right posterior frontal/parietal lobes:   Tmax >6s vol 29 cc, CBF <30% vol 5 cc; mismatch vol / penumbra 24 cc.  - C/w Aspirin 81mg  - decide on starting Plavix tomorrow  - C/w Atorvastatin 80mg daily  - MRI Brain Non-con showing acute infarct in right insular region, official read pending  - Obtain TTE  - will possibly need ILR  - HbA1c 5.4, TSH and LDL 70  - Tele monitoring  - Physical therapy/Occupational therapy/Speech and Swallow/Physiatry eval  - Fall and Aspiration precautions  - Admit to Stroke Unit and Tele monitoring  - Neurochecks q4 hrs  - Provide stroke education    #UTI  - UA positive  - f.u urine culture  - start Bactrim for 3 days as pt is allergic to Penicillin    #Elevated trops  - likely due to type 2 MI damand ischemia  - no need to trend trops  - cardio consult appreciated    #DM  - insulin SS for now  - monitor FS    #HTN  - hold HTN meds for now  - permissive HTN    #Polyneuropathy  - c/w gabapentin    #Depression  - c/w cymbalta    #CKD  - monitor cr  - elevated trops, no need to trend now    #COVID pos  - monitor for now    #GERD  - c/w pantoprazole    #Misc:  - DVT Prophylaxis: heparin  - Diet: puree  - GI Prophylaxis: pantoprazole  - Code status: Full code  - Dispo: 2A CEU     Family update: Spoke with  Darell over the phone and updated him about the medical plan.    Pendin) TTE  2) decision on starting plavix 72 yo F with PMHx of DM, HTN, HLD, CKD, Polyneuropathy, Depression, GERD presented from [facility] for left facial, left hemiparesis, dysarthria, aphasia and neglect on the left. LKW unknown, was found in the floor today 930AM. CTH showed no acute findings, chronic ischemic strokes. CTA Short segment occlusion of a right MCA posterior mid M2 branch. Evidence for corresponding ischemia in the right posterior frontal/parietal lobes. CTP shows a 24 cc penumbra. Pt was a code NI alert and was deemed not a candidate as MRS 3. She also tested positive for COVID. Admit to stroke unit for workup.      #Stroke workup  - CT head non con: No acute pathology  - CTA H&N and CTP: Short segment occlusion of a right MCA posterior mid M2 branch. Evidence   for corresponding ischemia in the right posterior frontal/parietal lobes:   Tmax >6s vol 29 cc, CBF <30% vol 5 cc; mismatch vol / penumbra 24 cc.  - C/w Aspirin 81mg  - decide on starting Plavix tomorrow  - C/w Atorvastatin 80mg daily  - MRI Brain Non-con showing acute infarct in right insular region, official read pending  - Obtain TTE  - will possibly need ILR  - HbA1c 5.4, TSH and LDL 70  - Tele monitoring  - Physical therapy/Occupational therapy/Speech and Swallow/Physiatry eval  - Fall and Aspiration precautions  - Admit to Stroke Unit and Tele monitoring  - Neurochecks q4 hrs  - Provide stroke education    #UTI  - UA positive  - f.u urine culture  - start Bactrim for 3 days as pt is allergic to Penicillin    #Elevated trops  - likely due to type 2 MI damand ischemia  - no need to trend trops  - cardio consult appreciated    #DM  - insulin SS for now  - monitor FS    #HTN  - hold HTN meds for now  - permissive HTN    #Polyneuropathy  - c/w gabapentin    #Depression  - c/w cymbalta    #CKD  - monitor cr  - elevated trops, no need to trend now    #COVID pos  - monitor for now    #GERD  - c/w pantoprazole    #Misc:  - DVT Prophylaxis: heparin  - Diet: puree  - GI Prophylaxis: pantoprazole  - Code status: Full code  - Dispo: 2A CEU     Family update: Spoke with  Darell over the phone and updated him about the medical plan.    Pendin) TTE  2) decision on starting plavix 72 yo F with PMHx of DM, HTN, HLD, CKD, Polyneuropathy, Depression, GERD presented from [facility] for left facial, left hemiparesis, dysarthria, aphasia and neglect on the left. LKW unknown, was found in the floor today 930AM. CTH showed no acute findings, chronic ischemic strokes. CTA Short segment occlusion of a right MCA posterior mid M2 branch. Evidence for corresponding ischemia in the right posterior frontal/parietal lobes. CTP shows a 24 cc penumbra. Pt was a code NI alert and was deemed not a candidate as MRS 3. She also tested positive for COVID. Admit to stroke unit for workup.      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 Evidence   for corresponding ischemia in the right posterior frontal/parietal lobes:   Tmax >6s vol 29 cc, CBF <30% vol 5 cc; mismatch vol / penumbra 24 cc.  - C/w Aspirin 81mg  - decide on starting Plavix tomorrow  - C/w Atorvastatin 80mg daily  - MRI Brain Non-con showing acute infarct in right insular region, official read pending  - Obtain TTE  - will possibly need ILR  - HbA1c 5.4, TSH and LDL 70  - Tele monitoring  - Physical therapy/Occupational therapy/Speech and Swallow/Physiatry eval  - Fall and Aspiration precautions  - Admit to Stroke Unit and Tele monitoring  - Neurochecks q4 hrs  - Provide stroke education    #UTI  - UA positive  - f.u urine culture  - start Bactrim for 3 days as pt is allergic to Penicillin    #Elevated trops  - likely due to type 2 MI damand ischemia  - no need to trend trops  - cardio consult appreciated    #DM  - insulin SS for now  - monitor FS    #HTN  - hold HTN meds for now  - permissive HTN    #Polyneuropathy  - c/w gabapentin    #Depression  - c/w cymbalta    #CKD  - monitor cr  - elevated trops, no need to trend now    #COVID pos  - monitor for now    #GERD  - c/w pantoprazole    #Misc:  - DVT Prophylaxis: heparin  - Diet: puree  - GI Prophylaxis: pantoprazole  - Code status: Full code  - Dispo: 2A CEU     Family update: Spoke with  Darell over the phone and updated him about the medical plan.    Pendin) TTE  2) decision on starting plavix 72 yo F with PMHx of DM, HTN, HLD, CKD, Polyneuropathy, Depression, GERD presented from [facility] for left facial, left hemiparesis, dysarthria, aphasia and neglect on the left. LKW unknown, was found in the floor today 930AM. CTH showed no acute findings, chronic ischemic strokes. CTA Short segment occlusion of a right MCA posterior mid M2 branch. Evidence for corresponding ischemia in the right posterior frontal/parietal lobes. CTP shows a 24 cc penumbra. Pt was a code NI alert and was deemed not a candidate as MRS 3. She also tested positive for COVID. Admit to stroke unit for workup.      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 Evidence   for corresponding ischemia in the right posterior frontal/parietal lobes:   Tmax >6s vol 29 cc, CBF <30% vol 5 cc; mismatch vol / penumbra 24 cc.  - C/w Aspirin 81mg  - decide on starting Plavix tomorrow  - C/w Atorvastatin 80mg daily  - MRI Brain Non-con showing acute infarct in right insular region, official read pending  - Obtain TTE  - will possibly need ILR  - HbA1c 5.4, TSH and LDL 70  - Tele monitoring  - Physical therapy/Occupational therapy/Speech and Swallow/Physiatry eval  - Fall and Aspiration precautions  - Admit to Stroke Unit and Tele monitoring  - Neurochecks q4 hrs  - Provide stroke education    #UTI  - UA positive  - f.u urine culture  - start Bactrim for 3 days as pt is allergic to Penicillin    #Elevated trops  - likely due to type 2 MI damand ischemia  - no need to trend trops  - cardio consult appreciated    #DM  - insulin SS for now  - monitor FS    #HTN  - hold HTN meds for now  - permissive HTN    #Polyneuropathy  - c/w gabapentin    #Depression  - c/w cymbalta    #CKD  - monitor cr  - elevated trops, no need to trend now    #COVID pos  - monitor for now    #GERD  - c/w pantoprazole    #Misc:  - DVT Prophylaxis: heparin  - Diet: puree  - GI Prophylaxis: pantoprazole  - Code status: Full code  - Dispo: 2A CEU     Family update: Spoke with  Darell over the phone and updated him about the medical plan.    Pendin) TTE  2) decision on starting plavix

## 2023-12-16 NOTE — PROGRESS NOTE ADULT - SUBJECTIVE AND OBJECTIVE BOX
Neurology Stroke Progress Note    INTERVAL HPI/OVERNIGHT EVENTS:  Patient seen and examined.  number ______ used.    MEDICATIONS  (STANDING):  aspirin enteric coated 81 milliGRAM(s) Oral daily  atorvastatin 80 milliGRAM(s) Oral at bedtime  calcium carbonate 1250 mG  + Vitamin D (OsCal 500 + D) 1 Tablet(s) Oral two times a day  dextrose 5%. 1000 milliLiter(s) (100 mL/Hr) IV Continuous <Continuous>  dextrose 5%. 1000 milliLiter(s) (50 mL/Hr) IV Continuous <Continuous>  dextrose 50% Injectable 12.5 Gram(s) IV Push once  dextrose 50% Injectable 25 Gram(s) IV Push once  dextrose 50% Injectable 25 Gram(s) IV Push once  DULoxetine 30 milliGRAM(s) Oral daily  gabapentin 100 milliGRAM(s) Oral three times a day  gabapentin 300 milliGRAM(s) Oral daily  glucagon  Injectable 1 milliGRAM(s) IntraMuscular once  heparin   Injectable 5000 Unit(s) SubCutaneous every 12 hours  insulin lispro (ADMELOG) corrective regimen sliding scale   SubCutaneous three times a day before meals  magnesium sulfate  IVPB 2 Gram(s) IV Intermittent once  melatonin 10 milliGRAM(s) Oral at bedtime  pantoprazole    Tablet 40 milliGRAM(s) Oral before breakfast  polyethylene glycol 3350 17 Gram(s) Oral daily  senna 2 Tablet(s) Oral at bedtime    MEDICATIONS  (PRN):  acetaminophen     Tablet .. 650 milliGRAM(s) Oral every 6 hours PRN Mild Pain (1 - 3), Moderate Pain (4 - 6)  dextrose Oral Gel 15 Gram(s) Oral once PRN Blood Glucose LESS THAN 70 milliGRAM(s)/deciliter    Allergies    penicillin (Unknown)    Intolerances      Vital Signs Last 24 Hrs  T(C): 37.2 (16 Dec 2023 08:11), Max: 37.2 (16 Dec 2023 08:11)  T(F): 98.9 (16 Dec 2023 08:11), Max: 98.9 (16 Dec 2023 08:11)  HR: 101 (16 Dec 2023 08:11) (87 - 116)  BP: 133/63 (16 Dec 2023 08:11) (107/62 - 144/78)  BP(mean): 78 (16 Dec 2023 01:14) (78 - 104)  RR: 18 (16 Dec 2023 08:11) (18 - 18)  SpO2: 98% (16 Dec 2023 08:11) (96% - 98%)    Parameters below as of 16 Dec 2023 01:14  Patient On (Oxygen Delivery Method): room air        Physical Exam: Neurological:  - Mental Status: AAOx1; aphasic  - Cranial Nerves II -XII:  II: PERRLA; visual fields are full to confrontation  III, IV, VI: EOMI, no nystagmus appreciated  V: facial sensation intact to light touch V1-V3 b/l  VII:left facial droop  VIII: hearing intact to finger rub b/l  XI: head turning and shoulder shrug intact b/l  XII: tongue protrusion midline  - Motor: 5/5 on right side. 4/5 in LUE, 3/5 in LLE  - Sensory: Intact fine touch, pain, temperature in UE and LE, unable to distinguish b/w right and left side  - Cerebellum: No dysmetria in FTN   - Gait: deferred    NIHSS: 8    LABS:                        9.8    6.09  )-----------( 185      ( 16 Dec 2023 06:10 )             29.5     12-16    142  |  106  |  21<H>  ----------------------------<  118<H>  4.4   |  24  |  1.7<H>    Ca    9.6      16 Dec 2023 06:10  Mg     1.7     12-16    TPro  5.9<L>  /  Alb  3.4<L>  /  TBili  0.5  /  DBili  x   /  AST  17  /  ALT  11  /  AlkPhos  134<H>  12-16      Urinalysis Basic - ( 16 Dec 2023 06:40 )    Color: Yellow / Appearance: Cloudy / SG: >1.030 / pH: x  Gluc: x / Ketone: Negative mg/dL  / Bili: Negative / Urobili: 0.2 mg/dL   Blood: x / Protein: 30 mg/dL / Nitrite: Positive   Leuk Esterase: Moderate / RBC: x / WBC x   Sq Epi: x / Non Sq Epi: x / Bacteria: x        RADIOLOGY & ADDITIONAL TESTS:     Neurology Stroke Progress Note    INTERVAL HPI/OVERNIGHT EVENTS:  Patient seen and examined. No events overnight, she got MRI brain. Pending rest of the workup.    MEDICATIONS  (STANDING):  aspirin enteric coated 81 milliGRAM(s) Oral daily  atorvastatin 80 milliGRAM(s) Oral at bedtime  calcium carbonate 1250 mG  + Vitamin D (OsCal 500 + D) 1 Tablet(s) Oral two times a day  dextrose 5%. 1000 milliLiter(s) (100 mL/Hr) IV Continuous <Continuous>  dextrose 5%. 1000 milliLiter(s) (50 mL/Hr) IV Continuous <Continuous>  dextrose 50% Injectable 12.5 Gram(s) IV Push once  dextrose 50% Injectable 25 Gram(s) IV Push once  dextrose 50% Injectable 25 Gram(s) IV Push once  DULoxetine 30 milliGRAM(s) Oral daily  gabapentin 100 milliGRAM(s) Oral three times a day  gabapentin 300 milliGRAM(s) Oral daily  glucagon  Injectable 1 milliGRAM(s) IntraMuscular once  heparin   Injectable 5000 Unit(s) SubCutaneous every 12 hours  insulin lispro (ADMELOG) corrective regimen sliding scale   SubCutaneous three times a day before meals  magnesium sulfate  IVPB 2 Gram(s) IV Intermittent once  melatonin 10 milliGRAM(s) Oral at bedtime  pantoprazole    Tablet 40 milliGRAM(s) Oral before breakfast  polyethylene glycol 3350 17 Gram(s) Oral daily  senna 2 Tablet(s) Oral at bedtime    MEDICATIONS  (PRN):  acetaminophen     Tablet .. 650 milliGRAM(s) Oral every 6 hours PRN Mild Pain (1 - 3), Moderate Pain (4 - 6)  dextrose Oral Gel 15 Gram(s) Oral once PRN Blood Glucose LESS THAN 70 milliGRAM(s)/deciliter    Allergies    penicillin (Unknown)    Intolerances      Vital Signs Last 24 Hrs  T(C): 37.2 (16 Dec 2023 08:11), Max: 37.2 (16 Dec 2023 08:11)  T(F): 98.9 (16 Dec 2023 08:11), Max: 98.9 (16 Dec 2023 08:11)  HR: 101 (16 Dec 2023 08:11) (87 - 116)  BP: 133/63 (16 Dec 2023 08:11) (107/62 - 144/78)  BP(mean): 78 (16 Dec 2023 01:14) (78 - 104)  RR: 18 (16 Dec 2023 08:11) (18 - 18)  SpO2: 98% (16 Dec 2023 08:11) (96% - 98%)    Parameters below as of 16 Dec 2023 01:14  Patient On (Oxygen Delivery Method): room air        Physical Exam: Neurological:  - Mental Status: AAOx1; aphasic  - Cranial Nerves II -XII:  II: PERRLA; visual fields are full to confrontation  III, IV, VI: EOMI, no nystagmus appreciated  V: facial sensation intact to light touch V1-V3 b/l  VII:left facial droop  VIII: hearing intact to finger rub b/l  XI: head turning and shoulder shrug intact b/l  XII: tongue protrusion midline  - Motor: 5/5 on right side. 4/5 in LUE, 3/5 in LLE  - Sensory: Intact fine touch, pain, temperature in UE and LE, unable to distinguish b/w right and left side  - Cerebellum: No dysmetria in FTN   - Gait: deferred    NIHSS: 8 (no question answered, facial paralysis, LUE, LLE drift, Aphasia, dysarthria, neglect)    LABS:                        9.8    6.09  )-----------( 185      ( 16 Dec 2023 06:10 )             29.5     12-16    142  |  106  |  21<H>  ----------------------------<  118<H>  4.4   |  24  |  1.7<H>    Ca    9.6      16 Dec 2023 06:10  Mg     1.7     12-16    TPro  5.9<L>  /  Alb  3.4<L>  /  TBili  0.5  /  DBili  x   /  AST  17  /  ALT  11  /  AlkPhos  134<H>  12-16      Urinalysis Basic - ( 16 Dec 2023 06:40 )    Color: Yellow / Appearance: Cloudy / SG: >1.030 / pH: x  Gluc: x / Ketone: Negative mg/dL  / Bili: Negative / Urobili: 0.2 mg/dL   Blood: x / Protein: 30 mg/dL / Nitrite: Positive   Leuk Esterase: Moderate / RBC: x / WBC x   Sq Epi: x / Non Sq Epi: x / Bacteria: x        RADIOLOGY & ADDITIONAL TESTS: reviewed

## 2023-12-17 LAB
ALBUMIN SERPL ELPH-MCNC: 3.2 G/DL — LOW (ref 3.5–5.2)
ALBUMIN SERPL ELPH-MCNC: 3.2 G/DL — LOW (ref 3.5–5.2)
ALP SERPL-CCNC: 129 U/L — HIGH (ref 30–115)
ALP SERPL-CCNC: 129 U/L — HIGH (ref 30–115)
ALT FLD-CCNC: 10 U/L — SIGNIFICANT CHANGE UP (ref 0–41)
ALT FLD-CCNC: 10 U/L — SIGNIFICANT CHANGE UP (ref 0–41)
ANION GAP SERPL CALC-SCNC: 11 MMOL/L — SIGNIFICANT CHANGE UP (ref 7–14)
ANION GAP SERPL CALC-SCNC: 11 MMOL/L — SIGNIFICANT CHANGE UP (ref 7–14)
AST SERPL-CCNC: 17 U/L — SIGNIFICANT CHANGE UP (ref 0–41)
AST SERPL-CCNC: 17 U/L — SIGNIFICANT CHANGE UP (ref 0–41)
BILIRUB SERPL-MCNC: 0.5 MG/DL — SIGNIFICANT CHANGE UP (ref 0.2–1.2)
BILIRUB SERPL-MCNC: 0.5 MG/DL — SIGNIFICANT CHANGE UP (ref 0.2–1.2)
BUN SERPL-MCNC: 21 MG/DL — HIGH (ref 10–20)
BUN SERPL-MCNC: 21 MG/DL — HIGH (ref 10–20)
CALCIUM SERPL-MCNC: 9 MG/DL — SIGNIFICANT CHANGE UP (ref 8.4–10.5)
CALCIUM SERPL-MCNC: 9 MG/DL — SIGNIFICANT CHANGE UP (ref 8.4–10.5)
CHLORIDE SERPL-SCNC: 104 MMOL/L — SIGNIFICANT CHANGE UP (ref 98–110)
CHLORIDE SERPL-SCNC: 104 MMOL/L — SIGNIFICANT CHANGE UP (ref 98–110)
CO2 SERPL-SCNC: 25 MMOL/L — SIGNIFICANT CHANGE UP (ref 17–32)
CO2 SERPL-SCNC: 25 MMOL/L — SIGNIFICANT CHANGE UP (ref 17–32)
CREAT SERPL-MCNC: 1.8 MG/DL — HIGH (ref 0.7–1.5)
CREAT SERPL-MCNC: 1.8 MG/DL — HIGH (ref 0.7–1.5)
EGFR: 29 ML/MIN/1.73M2 — LOW
EGFR: 29 ML/MIN/1.73M2 — LOW
GLUCOSE SERPL-MCNC: 100 MG/DL — HIGH (ref 70–99)
GLUCOSE SERPL-MCNC: 100 MG/DL — HIGH (ref 70–99)
HCT VFR BLD CALC: 28.3 % — LOW (ref 37–47)
HCT VFR BLD CALC: 28.3 % — LOW (ref 37–47)
HGB BLD-MCNC: 9.4 G/DL — LOW (ref 12–16)
HGB BLD-MCNC: 9.4 G/DL — LOW (ref 12–16)
MAGNESIUM SERPL-MCNC: 2.3 MG/DL — SIGNIFICANT CHANGE UP (ref 1.8–2.4)
MAGNESIUM SERPL-MCNC: 2.3 MG/DL — SIGNIFICANT CHANGE UP (ref 1.8–2.4)
MCHC RBC-ENTMCNC: 32 PG — HIGH (ref 27–31)
MCHC RBC-ENTMCNC: 32 PG — HIGH (ref 27–31)
MCHC RBC-ENTMCNC: 33.2 G/DL — SIGNIFICANT CHANGE UP (ref 32–37)
MCHC RBC-ENTMCNC: 33.2 G/DL — SIGNIFICANT CHANGE UP (ref 32–37)
MCV RBC AUTO: 96.3 FL — SIGNIFICANT CHANGE UP (ref 81–99)
MCV RBC AUTO: 96.3 FL — SIGNIFICANT CHANGE UP (ref 81–99)
NRBC # BLD: 0 /100 WBCS — SIGNIFICANT CHANGE UP (ref 0–0)
NRBC # BLD: 0 /100 WBCS — SIGNIFICANT CHANGE UP (ref 0–0)
PLATELET # BLD AUTO: 184 K/UL — SIGNIFICANT CHANGE UP (ref 130–400)
PLATELET # BLD AUTO: 184 K/UL — SIGNIFICANT CHANGE UP (ref 130–400)
PMV BLD: 9.8 FL — SIGNIFICANT CHANGE UP (ref 7.4–10.4)
PMV BLD: 9.8 FL — SIGNIFICANT CHANGE UP (ref 7.4–10.4)
POTASSIUM SERPL-MCNC: 4.4 MMOL/L — SIGNIFICANT CHANGE UP (ref 3.5–5)
POTASSIUM SERPL-MCNC: 4.4 MMOL/L — SIGNIFICANT CHANGE UP (ref 3.5–5)
POTASSIUM SERPL-SCNC: 4.4 MMOL/L — SIGNIFICANT CHANGE UP (ref 3.5–5)
POTASSIUM SERPL-SCNC: 4.4 MMOL/L — SIGNIFICANT CHANGE UP (ref 3.5–5)
PROT SERPL-MCNC: 5.7 G/DL — LOW (ref 6–8)
PROT SERPL-MCNC: 5.7 G/DL — LOW (ref 6–8)
RBC # BLD: 2.94 M/UL — LOW (ref 4.2–5.4)
RBC # BLD: 2.94 M/UL — LOW (ref 4.2–5.4)
RBC # FLD: 12.7 % — SIGNIFICANT CHANGE UP (ref 11.5–14.5)
RBC # FLD: 12.7 % — SIGNIFICANT CHANGE UP (ref 11.5–14.5)
SODIUM SERPL-SCNC: 140 MMOL/L — SIGNIFICANT CHANGE UP (ref 135–146)
SODIUM SERPL-SCNC: 140 MMOL/L — SIGNIFICANT CHANGE UP (ref 135–146)
WBC # BLD: 6.25 K/UL — SIGNIFICANT CHANGE UP (ref 4.8–10.8)
WBC # BLD: 6.25 K/UL — SIGNIFICANT CHANGE UP (ref 4.8–10.8)
WBC # FLD AUTO: 6.25 K/UL — SIGNIFICANT CHANGE UP (ref 4.8–10.8)
WBC # FLD AUTO: 6.25 K/UL — SIGNIFICANT CHANGE UP (ref 4.8–10.8)

## 2023-12-17 PROCEDURE — 99233 SBSQ HOSP IP/OBS HIGH 50: CPT

## 2023-12-17 RX ORDER — CLOPIDOGREL BISULFATE 75 MG/1
75 TABLET, FILM COATED ORAL DAILY
Refills: 0 | Status: DISCONTINUED | OUTPATIENT
Start: 2023-12-17 | End: 2023-12-21

## 2023-12-17 RX ORDER — SODIUM CHLORIDE 9 MG/ML
1000 INJECTION INTRAMUSCULAR; INTRAVENOUS; SUBCUTANEOUS
Refills: 0 | Status: DISCONTINUED | OUTPATIENT
Start: 2023-12-17 | End: 2023-12-17

## 2023-12-17 RX ORDER — SODIUM CHLORIDE 9 MG/ML
250 INJECTION INTRAMUSCULAR; INTRAVENOUS; SUBCUTANEOUS ONCE
Refills: 0 | Status: COMPLETED | OUTPATIENT
Start: 2023-12-17 | End: 2023-12-17

## 2023-12-17 RX ORDER — SODIUM CHLORIDE 9 MG/ML
1000 INJECTION INTRAMUSCULAR; INTRAVENOUS; SUBCUTANEOUS
Refills: 0 | Status: COMPLETED | OUTPATIENT
Start: 2023-12-17 | End: 2023-12-18

## 2023-12-17 RX ORDER — ASPIRIN/CALCIUM CARB/MAGNESIUM 324 MG
300 TABLET ORAL ONCE
Refills: 0 | Status: COMPLETED | OUTPATIENT
Start: 2023-12-17 | End: 2023-12-17

## 2023-12-17 RX ADMIN — Medication 650 MILLIGRAM(S): at 06:58

## 2023-12-17 RX ADMIN — Medication 1 TABLET(S): at 08:33

## 2023-12-17 RX ADMIN — Medication 300 MILLIGRAM(S): at 16:30

## 2023-12-17 RX ADMIN — Medication 1 TABLET(S): at 06:59

## 2023-12-17 RX ADMIN — HEPARIN SODIUM 5000 UNIT(S): 5000 INJECTION INTRAVENOUS; SUBCUTANEOUS at 17:09

## 2023-12-17 RX ADMIN — GABAPENTIN 100 MILLIGRAM(S): 400 CAPSULE ORAL at 06:58

## 2023-12-17 RX ADMIN — SODIUM CHLORIDE 75 MILLILITER(S): 9 INJECTION INTRAMUSCULAR; INTRAVENOUS; SUBCUTANEOUS at 17:44

## 2023-12-17 RX ADMIN — HEPARIN SODIUM 5000 UNIT(S): 5000 INJECTION INTRAVENOUS; SUBCUTANEOUS at 06:58

## 2023-12-17 RX ADMIN — SODIUM CHLORIDE 500 MILLILITER(S): 9 INJECTION INTRAMUSCULAR; INTRAVENOUS; SUBCUTANEOUS at 07:40

## 2023-12-17 RX ADMIN — Medication 650 MILLIGRAM(S): at 07:58

## 2023-12-17 RX ADMIN — SODIUM CHLORIDE 40 MILLILITER(S): 9 INJECTION INTRAMUSCULAR; INTRAVENOUS; SUBCUTANEOUS at 15:35

## 2023-12-17 NOTE — PROGRESS NOTE ADULT - ASSESSMENT
73F PMHx of DM, HTN, HLD, CKD, Polyneuropathy, Depression, GERD presented from [facility] for left facial, left hemiparesis, dysarthria, aphasia and neglect on the left with unknown LKW CTH showed no acute findings, chronic ischemic strokes. CTA Short segment occlusion of a right MCA posterior mid M2 branch. Evidence for corresponding ischemia in the right posterior frontal/parietal lobes. CTP shows a 24 cc penumbra. Pt was a code NI alert and was deemed not a candidate as MRS 3. She also tested positive for COVID. Admit to stroke unit for workup. MRI revealed acute R insular cortex, and R frontotemporal operculum stroke, with stable mild ventriculomegaly. Starting plavix today pending TTE.       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 Evidence   for corresponding ischemia in the right posterior frontal/parietal lobes:   Tmax >6s vol 29 cc, CBF <30% vol 5 cc; mismatch vol / penumbra 24 cc.  - C/w Aspirin 81mg  - Starting Plavix today  - C/w Atorvastatin 80mg daily  - MRI Brain Non-con showing acute infarct in right insular region, official read pending  - PendingTTE  - will possibly need ILR  - HbA1c 5.4, TSH and LDL 70  - Tele monitoring  - Physical therapy/Occupational therapy/Speech and Swallow/Physiatry eval  - Fall and Aspiration precautions  - Admit to Stroke Unit and Tele monitoring  - Neurochecks q4 hrs  - Provide stroke education    #UTI  - UA positive  - f.u urine culture  - Bactrim for 3 days as pt is allergic to Penicillin    #Elevated trops  - likely due to type 2 MI damand ischemia  - no need to trend trops  - cardio consult appreciated    #DM  - insulin SS for now  - monitor FS    #HTN  - hold HTN meds for now  - permissive HTN    #Polyneuropathy  - c/w gabapentin    #Depression  - c/w cymbalta    #CKD  - monitor cr  - elevated trops, no need to trend now    #COVID pos  - monitor for now    #GERD  - c/w pantoprazole    #Misc:  - DVT Prophylaxis: heparin  - Diet: puree  - GI Prophylaxis: pantoprazole  - Code status: Full code  - Dispo: 2A CEU     Family updated over the phone    Pendin) TTE 73F PMHx of DM, HTN, HLD, CKD, Polyneuropathy, Depression, GERD presented from [facility] for left facial, left hemiparesis, dysarthria, aphasia and neglect on the left with unknown LKW CTH showed no acute findings, chronic ischemic strokes. CTA Short segment occlusion of a right MCA posterior mid M2 branch. Evidence for corresponding ischemia in the right posterior frontal/parietal lobes. CTP shows a 24 cc penumbra. Pt was a code NI alert and was deemed not a candidate as MRS 3. She also tested positive for COVID. Admit to stroke unit for workup. MRI revealed acute R insular cortex, and R frontotemporal operculum stroke, with stable mild ventriculomegaly. Starting plavix today pending TTE.       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 Evidence   for corresponding ischemia in the right posterior frontal/parietal lobes:   Tmax >6s vol 29 cc, CBF <30% vol 5 cc; mismatch vol / penumbra 24 cc.  - C/w Aspirin 81mg  - Starting Plavix today  - C/w Atorvastatin 80mg daily  - MRI Brain Non-con showing acute infarct in right insular region, official read pending  - PendingTTE  - will possibly need ILR  - HbA1c 5.4, TSH and LDL 70  - Tele monitoring  - Physical therapy/Occupational therapy/Speech and Swallow/Physiatry eval  - Fall and Aspiration precautions  - Admit to Stroke Unit and Tele monitoring  - Neurochecks q4 hrs  - Provide stroke education    #UTI  - UA positive  - f.u urine culture  - Bactrim for 3 days as pt is allergic to Penicillin    #Elevated trops  - likely due to type 2 MI damand ischemia  - no need to trend trops  - cardio consult appreciated    #DM  - insulin SS for now  - monitor FS    #HTN  - hold HTN meds for now  - permissive HTN    #Polyneuropathy  - c/w gabapentin    #Depression  - c/w cymbalta    #CKD  - monitor cr  - elevated trops, no need to trend now    #COVID pos  - monitor for now    #GERD  - c/w pantoprazole    #Misc:  - DVT Prophylaxis: heparin  - Diet: puree  - GI Prophylaxis: pantoprazole  - Code status: Full code  - Dispo: 2A CEU     Family updated over the phone    Pendin) TTE

## 2023-12-17 NOTE — PATIENT PROFILE ADULT - FALL HARM RISK - HARM RISK INTERVENTIONS
Assistance with ambulation/Assistance OOB with selected safe patient handling equipment/Communicate Risk of Fall with Harm to all staff/Discuss with provider need for PT consult/Monitor gait and stability/Reinforce activity limits and safety measures with patient and family/Tailored Fall Risk Interventions/Visual Cue: Yellow wristband and red socks/Bed in lowest position, wheels locked, appropriate side rails in place/Call bell, personal items and telephone in reach/Instruct patient to call for assistance before getting out of bed or chair/Non-slip footwear when patient is out of bed/[place] to call system/Physically safe environment - no spills, clutter or unnecessary equipment/Purposeful Proactive Rounding/Room/bathroom lighting operational, light cord in reach Assistance with ambulation/Assistance OOB with selected safe patient handling equipment/Communicate Risk of Fall with Harm to all staff/Discuss with provider need for PT consult/Monitor gait and stability/Reinforce activity limits and safety measures with patient and family/Tailored Fall Risk Interventions/Visual Cue: Yellow wristband and red socks/Bed in lowest position, wheels locked, appropriate side rails in place/Call bell, personal items and telephone in reach/Instruct patient to call for assistance before getting out of bed or chair/Non-slip footwear when patient is out of bed/[place] to call system/Physically safe environment - no spills, clutter or unnecessary equipment/Purposeful Proactive Rounding/Room/bathroom lighting operational, light cord in reach

## 2023-12-17 NOTE — PATIENT PROFILE ADULT - FALL HARM RISK - CONCLUSION
Handoff report given to Preston Segal RN. Patient to be transported up to 2W. Patient to update family on room change. Actemra IVPB hanging.  4L NC. Fall with Harm Risk

## 2023-12-17 NOTE — PROGRESS NOTE ADULT - SUBJECTIVE AND OBJECTIVE BOX
Neurology Progress Note    Interval History:  The patient was seen and examined at the bedside. No acute complaints.       PAST MEDICAL & SURGICAL HISTORY:  DM (diabetes mellitus)    HLD (hyperlipidemia)    Depression, major    Chronic kidney disease, unspecified CKD stage    Polyneuropathy            Medications:  acetaminophen     Tablet .. 650 milliGRAM(s) Oral every 12 hours  aspirin enteric coated 81 milliGRAM(s) Oral daily  atorvastatin 80 milliGRAM(s) Oral at bedtime  calcium carbonate 1250 mG  + Vitamin D (OsCal 500 + D) 1 Tablet(s) Oral two times a day  clopidogrel Tablet 75 milliGRAM(s) Oral daily  dextrose 5%. 1000 milliLiter(s) IV Continuous <Continuous>  dextrose 5%. 1000 milliLiter(s) IV Continuous <Continuous>  dextrose 50% Injectable 12.5 Gram(s) IV Push once  dextrose 50% Injectable 25 Gram(s) IV Push once  dextrose 50% Injectable 25 Gram(s) IV Push once  dextrose Oral Gel 15 Gram(s) Oral once PRN  DULoxetine 30 milliGRAM(s) Oral daily  gabapentin 100 milliGRAM(s) Oral three times a day  gabapentin 300 milliGRAM(s) Oral daily  glucagon  Injectable 1 milliGRAM(s) IntraMuscular once  heparin   Injectable 5000 Unit(s) SubCutaneous every 12 hours  insulin lispro (ADMELOG) corrective regimen sliding scale   SubCutaneous three times a day before meals  melatonin 10 milliGRAM(s) Oral at bedtime  pantoprazole    Tablet 40 milliGRAM(s) Oral before breakfast  polyethylene glycol 3350 17 Gram(s) Oral daily  senna 2 Tablet(s) Oral at bedtime  trimethoprim  160 mG/sulfamethoxazole 800 mG 1 Tablet(s) Oral every 12 hours      Vital Signs Last 24 Hrs  T(C): 36.9 (17 Dec 2023 00:12), Max: 36.9 (17 Dec 2023 00:12)  T(F): 98.4 (17 Dec 2023 00:12), Max: 98.4 (17 Dec 2023 00:12)  HR: 86 (17 Dec 2023 07:15) (79 - 102)  BP: 118/63 (17 Dec 2023 07:23) (106/60 - 150/92)  BP(mean): 82 (17 Dec 2023 07:15) (82 - 117)  RR: 18 (17 Dec 2023 04:29) (18 - 18)  SpO2: 96% (17 Dec 2023 04:29) (96% - 100%)    Parameters below as of 17 Dec 2023 04:29  Patient On (Oxygen Delivery Method): room air      Physical Exam: Neurological:  - Mental Status: AAOx1; oriented to self. Month=march. Word finding difficulties and impaired comprehension.  - Cranial Nerves II -XII:  II: PERRLA; visual fields are full to confrontation  III, IV, VI: EOMI, no nystagmus appreciated  V: facial sensation intact to light touch V1-V3 b/l  VII:left facial droop  VIII: hearing intact to finger rub b/l  XI: head turning and shoulder shrug intact b/l  XII: tongue protrusion midline  - Motor: 5/5 on right side. 4/5 in LUE, 3/5 in LLE  - Sensory: Intact fine touch, pain, temperature in UE and LE, unable to distinguish b/w right and left side  - Cerebellum: No dysmetria in FTN   - Gait: deferred  NIHSS: 8 LUE, LLE drift, Aphasia, dysarthria, neglect)    Labs:  CBC Full  -  ( 17 Dec 2023 06:46 )  WBC Count : 6.25 K/uL  RBC Count : 2.94 M/uL  Hemoglobin : 9.4 g/dL  Hematocrit : 28.3 %  Platelet Count - Automated : 184 K/uL  Mean Cell Volume : 96.3 fL  Mean Cell Hemoglobin : 32.0 pg  Mean Cell Hemoglobin Concentration : 33.2 g/dL  Auto Neutrophil # : x  Auto Lymphocyte # : x  Auto Monocyte # : x  Auto Eosinophil # : x  Auto Basophil # : x  Auto Neutrophil % : x  Auto Lymphocyte % : x  Auto Monocyte % : x  Auto Eosinophil % : x  Auto Basophil % : x    12-17    140  |  104  |  21<H>  ----------------------------<  100<H>  4.4   |  25  |  1.8<H>    Ca    9.0      17 Dec 2023 06:46  Mg     2.3     12-17    TPro  5.7<L>  /  Alb  3.2<L>  /  TBili  0.5  /  DBili  x   /  AST  17  /  ALT  10  /  AlkPhos  129<H>  12-17    LIVER FUNCTIONS - ( 17 Dec 2023 06:46 )  Alb: 3.2 g/dL / Pro: 5.7 g/dL / ALK PHOS: 129 U/L / ALT: 10 U/L / AST: 17 U/L / GGT: x             Urinalysis Basic - ( 17 Dec 2023 06:46 )    Color: x / Appearance: x / SG: x / pH: x  Gluc: 100 mg/dL / Ketone: x  / Bili: x / Urobili: x   Blood: x / Protein: x / Nitrite: x   Leuk Esterase: x / RBC: x / WBC x   Sq Epi: x / Non Sq Epi: x / Bacteria: x      < from: MR Head No Cont (12.15.23 @ 17:00) >      IMPRESSION:    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 No hemorrhagic transformation.    Stable mild ventriculomegaly.    --- End of Report ---      < end of copied text >

## 2023-12-17 NOTE — PATIENT PROFILE ADULT - FUNCTIONAL ASSESSMENT - DAILY ACTIVITY 4.
PRINCIPAL DISCHARGE DIAGNOSIS  Diagnosis: Contracture, left elbow  Assessment and Plan of Treatment:       
2 = A lot of assistance

## 2023-12-17 NOTE — CHART NOTE - NSCHARTNOTEFT_GEN_A_CORE
Patient refused all oral medications. Extensive discussion with her but she demonstrates poor insight. Son and  were made aware. (They also tried to encourage her to take the meds).

## 2023-12-17 NOTE — PATIENT PROFILE ADULT - NURSING HOMES
[facility], [facility] and [facility], [facility]

## 2023-12-18 ENCOUNTER — TRANSCRIPTION ENCOUNTER (OUTPATIENT)
Age: 73
End: 2023-12-18

## 2023-12-18 LAB
HCT VFR BLD CALC: 28.4 % — LOW (ref 37–47)
HCT VFR BLD CALC: 28.4 % — LOW (ref 37–47)
HGB BLD-MCNC: 9.4 G/DL — LOW (ref 12–16)
HGB BLD-MCNC: 9.4 G/DL — LOW (ref 12–16)
MCHC RBC-ENTMCNC: 31.5 PG — HIGH (ref 27–31)
MCHC RBC-ENTMCNC: 31.5 PG — HIGH (ref 27–31)
MCHC RBC-ENTMCNC: 33.1 G/DL — SIGNIFICANT CHANGE UP (ref 32–37)
MCHC RBC-ENTMCNC: 33.1 G/DL — SIGNIFICANT CHANGE UP (ref 32–37)
MCV RBC AUTO: 95.3 FL — SIGNIFICANT CHANGE UP (ref 81–99)
MCV RBC AUTO: 95.3 FL — SIGNIFICANT CHANGE UP (ref 81–99)
NRBC # BLD: 0 /100 WBCS — SIGNIFICANT CHANGE UP (ref 0–0)
NRBC # BLD: 0 /100 WBCS — SIGNIFICANT CHANGE UP (ref 0–0)
PLATELET # BLD AUTO: 189 K/UL — SIGNIFICANT CHANGE UP (ref 130–400)
PLATELET # BLD AUTO: 189 K/UL — SIGNIFICANT CHANGE UP (ref 130–400)
PMV BLD: 9.6 FL — SIGNIFICANT CHANGE UP (ref 7.4–10.4)
PMV BLD: 9.6 FL — SIGNIFICANT CHANGE UP (ref 7.4–10.4)
RBC # BLD: 2.98 M/UL — LOW (ref 4.2–5.4)
RBC # BLD: 2.98 M/UL — LOW (ref 4.2–5.4)
RBC # FLD: 12.4 % — SIGNIFICANT CHANGE UP (ref 11.5–14.5)
RBC # FLD: 12.4 % — SIGNIFICANT CHANGE UP (ref 11.5–14.5)
WBC # BLD: 6.43 K/UL — SIGNIFICANT CHANGE UP (ref 4.8–10.8)
WBC # BLD: 6.43 K/UL — SIGNIFICANT CHANGE UP (ref 4.8–10.8)
WBC # FLD AUTO: 6.43 K/UL — SIGNIFICANT CHANGE UP (ref 4.8–10.8)
WBC # FLD AUTO: 6.43 K/UL — SIGNIFICANT CHANGE UP (ref 4.8–10.8)

## 2023-12-18 PROCEDURE — 99223 1ST HOSP IP/OBS HIGH 75: CPT

## 2023-12-18 PROCEDURE — 99233 SBSQ HOSP IP/OBS HIGH 50: CPT

## 2023-12-18 PROCEDURE — 93306 TTE W/DOPPLER COMPLETE: CPT | Mod: 26

## 2023-12-18 RX ORDER — CHLORHEXIDINE GLUCONATE 213 G/1000ML
1 SOLUTION TOPICAL DAILY
Refills: 0 | Status: DISCONTINUED | OUTPATIENT
Start: 2023-12-18 | End: 2023-12-21

## 2023-12-18 RX ADMIN — SODIUM CHLORIDE 75 MILLILITER(S): 9 INJECTION INTRAMUSCULAR; INTRAVENOUS; SUBCUTANEOUS at 09:01

## 2023-12-18 RX ADMIN — ATORVASTATIN CALCIUM 80 MILLIGRAM(S): 80 TABLET, FILM COATED ORAL at 21:31

## 2023-12-18 RX ADMIN — Medication 81 MILLIGRAM(S): at 13:28

## 2023-12-18 RX ADMIN — GABAPENTIN 100 MILLIGRAM(S): 400 CAPSULE ORAL at 21:31

## 2023-12-18 RX ADMIN — PANTOPRAZOLE SODIUM 40 MILLIGRAM(S): 20 TABLET, DELAYED RELEASE ORAL at 06:03

## 2023-12-18 RX ADMIN — POLYETHYLENE GLYCOL 3350 17 GRAM(S): 17 POWDER, FOR SOLUTION ORAL at 13:28

## 2023-12-18 RX ADMIN — Medication 10 MILLIGRAM(S): at 21:31

## 2023-12-18 RX ADMIN — GABAPENTIN 100 MILLIGRAM(S): 400 CAPSULE ORAL at 06:00

## 2023-12-18 RX ADMIN — Medication 1 TABLET(S): at 06:00

## 2023-12-18 RX ADMIN — Medication 1 TABLET(S): at 18:10

## 2023-12-18 RX ADMIN — GABAPENTIN 300 MILLIGRAM(S): 400 CAPSULE ORAL at 13:28

## 2023-12-18 RX ADMIN — Medication 650 MILLIGRAM(S): at 06:30

## 2023-12-18 RX ADMIN — SODIUM CHLORIDE 75 MILLILITER(S): 9 INJECTION INTRAMUSCULAR; INTRAVENOUS; SUBCUTANEOUS at 18:19

## 2023-12-18 RX ADMIN — HEPARIN SODIUM 5000 UNIT(S): 5000 INJECTION INTRAVENOUS; SUBCUTANEOUS at 06:02

## 2023-12-18 RX ADMIN — SENNA PLUS 2 TABLET(S): 8.6 TABLET ORAL at 21:31

## 2023-12-18 RX ADMIN — Medication 650 MILLIGRAM(S): at 06:02

## 2023-12-18 RX ADMIN — DULOXETINE HYDROCHLORIDE 30 MILLIGRAM(S): 30 CAPSULE, DELAYED RELEASE ORAL at 13:27

## 2023-12-18 RX ADMIN — GABAPENTIN 100 MILLIGRAM(S): 400 CAPSULE ORAL at 13:28

## 2023-12-18 RX ADMIN — Medication 650 MILLIGRAM(S): at 18:10

## 2023-12-18 RX ADMIN — CLOPIDOGREL BISULFATE 75 MILLIGRAM(S): 75 TABLET, FILM COATED ORAL at 09:00

## 2023-12-18 RX ADMIN — HEPARIN SODIUM 5000 UNIT(S): 5000 INJECTION INTRAVENOUS; SUBCUTANEOUS at 18:11

## 2023-12-18 NOTE — SWALLOW BEDSIDE ASSESSMENT ADULT - SWALLOW EVAL: DIAGNOSIS
+toleration for regular solids, puree and thin liquids w/o overt s/s aspiration/penetration
Toleration of puree with thin liquids w/o overt s/s of penetration/aspiration. Mod oral dysphagia 2/2 to left facial droop for soft solids.

## 2023-12-18 NOTE — SWALLOW BEDSIDE ASSESSMENT ADULT - SLP GENERAL OBSERVATIONS
pt received in bed awake confused w/o c/o pain. +2L NC
Pt. received in bed awake and alert on RA. Oriented to person, place, and hospital. Flat affect. Low vocal quality.

## 2023-12-18 NOTE — DISCHARGE NOTE NURSING/CASE MANAGEMENT/SOCIAL WORK - NSDCPEFALRISK_GEN_ALL_CORE
For information on Fall & Injury Prevention, visit: [email or web address] OR  [email or web address] OR  https://www.cdc.gov/steadi/patient.html For information on Fall & Injury Prevention, visit: [email or web address] OR  [email or web address] OR  https://www.cdc.gov/steadi/patient.html

## 2023-12-18 NOTE — PHYSICAL THERAPY INITIAL EVALUATION ADULT - IMPAIRMENTS FOUND, PT EVAL
Tessa Ramsey  1971  [id number]    Have you had any Chest Pain - No      Have you had any Shortness of Breath - Yes  If Yes - When on exertion    Have you had any dizziness - No      Have you had any palpitations - Yes  If Yes DO EKG - Do you feel your heart racing  How long does it last - seconds     Do you have any edema - swelling in feet    If Yes - CHECK TO SEE IF THE EDEMA IS PITTING  How long have they been having edema - Months  If Yes - Have they worn compression stockings No      Do you have a surgery or procedure scheduled in the near future - No aerobic capacity/endurance/gait, locomotion, and balance/muscle strength

## 2023-12-18 NOTE — PROGRESS NOTE ADULT - ASSESSMENT
· Assessment	  74 yo F with PMHx of DM, HTN, HLD, CKD, Polyneuropathy, Depression, GERD presented from [facility] for left facial, left hemiparesis, dysarthria, aphasia and neglect on the left. LKW unknown, was found in the floor today 930AM. CTH showed no acute findings, chronic ischemic strokes. CTA Short segment occlusion of a right MCA posterior mid M2 branch. Evidence for corresponding ischemia in the right posterior frontal/parietal lobes. CTP shows a 24 cc penumbra. Pt was a code NI alert and was deemed not a candidate as MRS 3. She also tested positive for COVID. Currently admitted to CEU for stroke workup.       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 Evidence   for corresponding ischemia in the right posterior frontal/parietal lobes:   Tmax >6s vol 29 cc, CBF <30% vol 5 cc; mismatch vol / penumbra 24 cc.  - C/w Aspirin 81mg  - started Plavix today   - C/w Atorvastatin 80mg daily  - MRI Brain Non-con showing Acute cortical infarcts in the right insular cortex and right frontotemporal operculum.  - TTE: results above   - EP consulted for ILR  - HbA1c 5.4, TSH and LDL 70  - Tele monitoring  - Physical therapy/Occupational therapy/Physiatry eval  - Fall and Aspiration precautions  - Admit to Stroke Unit and Tele monitoring  - Neurochecks q4 hrs  - Provide stroke education    #UTI  - UA positive  - f.u urine culture  - start Bactrim for 3 days as pt is allergic to Penicillin    #Elevated trops  - likely due to type 2 MI damand ischemia  - no need to trend trops  - cardio consult appreciated    #DM  - insulin SS for now  - monitor FS    #HTN  - hold HTN meds for now  - permissive HTN    #Polyneuropathy  - c/w gabapentin    #Depression  - c/w cymbalta    #CKD  - monitor cr  - elevated trops, no need to trend now    #COVID pos  - monitor for now    #GERD  - c/w pantoprazole    #Misc:  - DVT Prophylaxis: heparin  - Diet: regular and thins as per speech today   - GI Prophylaxis: pantoprazole  - Code status: Full code  - Dispo: 2A CEU       Pendin) EP for ILR    · Assessment	  72 yo F with PMHx of DM, HTN, HLD, CKD, Polyneuropathy, Depression, GERD presented from [facility] for left facial, left hemiparesis, dysarthria, aphasia and neglect on the left. LKW unknown, was found in the floor today 930AM. CTH showed no acute findings, chronic ischemic strokes. CTA Short segment occlusion of a right MCA posterior mid M2 branch. Evidence for corresponding ischemia in the right posterior frontal/parietal lobes. CTP shows a 24 cc penumbra. Pt was a code NI alert and was deemed not a candidate as MRS 3. She also tested positive for COVID. Currently admitted to CEU for stroke workup.       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 Evidence   for corresponding ischemia in the right posterior frontal/parietal lobes:   Tmax >6s vol 29 cc, CBF <30% vol 5 cc; mismatch vol / penumbra 24 cc.  - C/w Aspirin 81mg  - started Plavix today   - C/w Atorvastatin 80mg daily  - MRI Brain Non-con showing Acute cortical infarcts in the right insular cortex and right frontotemporal operculum.  - TTE: results above   - EP consulted for ILR  - HbA1c 5.4, TSH and LDL 70  - Tele monitoring  - Physical therapy/Occupational therapy/Physiatry eval  - Fall and Aspiration precautions  - Admit to Stroke Unit and Tele monitoring  - Neurochecks q4 hrs  - Provide stroke education    #UTI  - UA positive  - f.u urine culture  - start Bactrim for 3 days as pt is allergic to Penicillin    #Elevated trops  - likely due to type 2 MI damand ischemia  - no need to trend trops  - cardio consult appreciated    #DM  - insulin SS for now  - monitor FS    #HTN  - hold HTN meds for now  - permissive HTN    #Polyneuropathy  - c/w gabapentin    #Depression  - c/w cymbalta    #CKD  - monitor cr  - elevated trops, no need to trend now    #COVID pos  - monitor for now    #GERD  - c/w pantoprazole    #Misc:  - DVT Prophylaxis: heparin  - Diet: regular and thins as per speech today   - GI Prophylaxis: pantoprazole  - Code status: Full code  - Dispo: 2A CEU       Pendin) EP for ILR    · Assessment	  74 yo F with PMHx of DM, HTN, HLD, CKD, Polyneuropathy, Depression, GERD presented from [facility] for left facial, left hemiparesis, dysarthria, aphasia and neglect on the left. LKW unknown, was found in the floor today 930AM. CTH showed no acute findings, chronic ischemic strokes. CTA Short segment occlusion of a right MCA posterior mid M2 branch. Evidence for corresponding ischemia in the right posterior frontal/parietal lobes. CTP shows a 24 cc penumbra. Pt was a code NI alert and was deemed not a candidate as MRS 3. She also tested positive for COVID. Currently admitted to CEU for stroke workup.       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 Evidence   for corresponding ischemia in the right posterior frontal/parietal lobes:   Tmax >6s vol 29 cc, CBF <30% vol 5 cc; mismatch vol / penumbra 24 cc.  - C/w Aspirin 81mg  - started Plavix today   - C/w Atorvastatin 80mg daily  - MRI Brain Non-con showing Acute cortical infarcts in the right insular cortex and right frontotemporal operculum.  - TTE: results above   - EP consulted for ILR  - HbA1c 5.4, TSH and LDL 70  - Tele monitoring  - Physical therapy/Occupational therapy/Physiatry eval  - Fall and Aspiration precautions  - Admit to Stroke Unit and Tele monitoring  - Neurochecks q4 hrs  - Provide stroke education    #UTI  - UA positive  - f.u urine culture  - start Bactrim for 3 days as pt is allergic to Penicillin    #Elevated trops  - likely due to type 2 MI damand ischemia  - no need to trend trops  - cardio consult appreciated    #DM  - insulin SS for now  - monitor FS    #HTN  - hold HTN meds for now  - permissive HTN    #Polyneuropathy  - c/w gabapentin    #Depression  - c/w cymbalta    #CKD  - monitor cr  - elevated trops, no need to trend now    #COVID pos  - monitor for now    #GERD  - c/w pantoprazole    #Misc:  - DVT Prophylaxis: heparin  - Diet: regular diet as per speech today   - GI Prophylaxis: pantoprazole  - Code status: Full code  - Dispo: 2A CEU       Pendin) EP for ILR    · Assessment	  72 yo F with PMHx of DM, HTN, HLD, CKD, Polyneuropathy, Depression, GERD presented from [facility] for left facial, left hemiparesis, dysarthria, aphasia and neglect on the left. LKW unknown, was found in the floor today 930AM. CTH showed no acute findings, chronic ischemic strokes. CTA Short segment occlusion of a right MCA posterior mid M2 branch. Evidence for corresponding ischemia in the right posterior frontal/parietal lobes. CTP shows a 24 cc penumbra. Pt was a code NI alert and was deemed not a candidate as MRS 3. She also tested positive for COVID. Currently admitted to CEU for stroke workup.       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 Evidence   for corresponding ischemia in the right posterior frontal/parietal lobes:   Tmax >6s vol 29 cc, CBF <30% vol 5 cc; mismatch vol / penumbra 24 cc.  - C/w Aspirin 81mg  - started Plavix today   - C/w Atorvastatin 80mg daily  - MRI Brain Non-con showing Acute cortical infarcts in the right insular cortex and right frontotemporal operculum.  - TTE: results above   - EP consulted for ILR  - HbA1c 5.4, TSH and LDL 70  - Tele monitoring  - Physical therapy/Occupational therapy/Physiatry eval  - Fall and Aspiration precautions  - Admit to Stroke Unit and Tele monitoring  - Neurochecks q4 hrs  - Provide stroke education    #UTI  - UA positive  - f.u urine culture  - start Bactrim for 3 days as pt is allergic to Penicillin    #Elevated trops  - likely due to type 2 MI damand ischemia  - no need to trend trops  - cardio consult appreciated    #DM  - insulin SS for now  - monitor FS    #HTN  - hold HTN meds for now  - permissive HTN    #Polyneuropathy  - c/w gabapentin    #Depression  - c/w cymbalta    #CKD  - monitor cr  - elevated trops, no need to trend now    #COVID pos  - monitor for now    #GERD  - c/w pantoprazole    #Misc:  - DVT Prophylaxis: heparin  - Diet: regular diet as per speech today   - GI Prophylaxis: pantoprazole  - Code status: Full code  - Dispo: 2A CEU       Pendin) EP for ILR    · Assessment	  74 yo F with PMHx of DM, HTN, HLD, CKD, Polyneuropathy, Depression, GERD presented from [facility] for left facial, left hemiparesis, dysarthria, aphasia and neglect on the left. LKW unknown, was found in the floor today 930AM. CTH showed no acute findings, chronic ischemic strokes. CTA Short segment occlusion of a right MCA posterior mid M2 branch. Evidence for corresponding ischemia in the right posterior frontal/parietal lobes. CTP shows a 24 cc penumbra. Pt was a code NI alert and was deemed not a candidate as MRS 3. She also tested positive for COVID. Currently admitted to CEU for stroke workup.       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 Evidence   for corresponding ischemia in the right posterior frontal/parietal lobes:   Tmax >6s vol 29 cc, CBF <30% vol 5 cc; mismatch vol / penumbra 24 cc.  - C/w Aspirin 81mg  - started Plavix today   - C/w Atorvastatin 80mg daily  - MRI Brain Non-con showing Acute cortical infarcts in the right insular cortex and right frontotemporal operculum.  - TTE: results above   - EP consulted for ILR  - HbA1c 5.4, TSH and LDL 70  - Tele monitoring  -SBP: 120-160  - Physical therapy/Occupational therapy/Physiatry eval  - Fall and Aspiration precautions  - Admit to Stroke Unit and Tele monitoring  - Neurochecks q4 hrs  - Provide stroke education    #UTI  - UA positive  - f.u urine culture  - start Bactrim for 3 days as pt is allergic to Penicillin    #Elevated trops  - likely due to type 2 MI damand ischemia  - no need to trend trops  - cardio consult appreciated    #DM  - insulin SS for now  - monitor FS    #HTN  - hold HTN meds for now  - permissive HTN    #Polyneuropathy  - c/w gabapentin    #Depression  - c/w cymbalta    #CKD  - monitor cr  - elevated trops, no need to trend now    #COVID pos  - monitor for now    #GERD  - c/w pantoprazole    #Misc:  - DVT Prophylaxis: heparin  - Diet: regular diet as per speech today   - GI Prophylaxis: pantoprazole  - Code status: Full code  - Dispo: 2A CEU       Pendin) EP for ILR    · Assessment	  72 yo F with PMHx of DM, HTN, HLD, CKD, Polyneuropathy, Depression, GERD presented from [facility] for left facial, left hemiparesis, dysarthria, aphasia and neglect on the left. LKW unknown, was found in the floor today 930AM. CTH showed no acute findings, chronic ischemic strokes. CTA Short segment occlusion of a right MCA posterior mid M2 branch. Evidence for corresponding ischemia in the right posterior frontal/parietal lobes. CTP shows a 24 cc penumbra. Pt was a code NI alert and was deemed not a candidate as MRS 3. She also tested positive for COVID. Currently admitted to CEU for stroke workup.       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 Evidence   for corresponding ischemia in the right posterior frontal/parietal lobes:   Tmax >6s vol 29 cc, CBF <30% vol 5 cc; mismatch vol / penumbra 24 cc.  - C/w Aspirin 81mg  - started Plavix today   - C/w Atorvastatin 80mg daily  - MRI Brain Non-con showing Acute cortical infarcts in the right insular cortex and right frontotemporal operculum.  - TTE: results above   - EP consulted for ILR  - HbA1c 5.4, TSH and LDL 70  - Tele monitoring  -SBP: 120-160  - Physical therapy/Occupational therapy/Physiatry eval  - Fall and Aspiration precautions  - Admit to Stroke Unit and Tele monitoring  - Neurochecks q4 hrs  - Provide stroke education    #UTI  - UA positive  - f.u urine culture  - start Bactrim for 3 days as pt is allergic to Penicillin    #Elevated trops  - likely due to type 2 MI damand ischemia  - no need to trend trops  - cardio consult appreciated    #DM  - insulin SS for now  - monitor FS    #HTN  - hold HTN meds for now  - permissive HTN    #Polyneuropathy  - c/w gabapentin    #Depression  - c/w cymbalta    #CKD  - monitor cr  - elevated trops, no need to trend now    #COVID pos  - monitor for now    #GERD  - c/w pantoprazole    #Misc:  - DVT Prophylaxis: heparin  - Diet: regular diet as per speech today   - GI Prophylaxis: pantoprazole  - Code status: Full code  - Dispo: 2A CEU       Pendin) EP for ILR    · Assessment	  74 yo F with PMHx of DM, HTN, HLD, CKD, Polyneuropathy, Depression, GERD presented from [facility] for left facial, left hemiparesis, dysarthria, aphasia and neglect on the left. LKW unknown, was found in the floor today 930AM. CTH showed no acute findings, chronic ischemic strokes. CTA Short segment occlusion of a right MCA posterior mid M2 branch. Evidence for corresponding ischemia in the right posterior frontal/parietal lobes. CTP shows a 24 cc penumbra. Pt was a code NI alert and was deemed not a candidate as MRS 3. She also tested positive for COVID. Currently admitted to CEU for stroke workup.       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 Evidence   for corresponding ischemia in the right posterior frontal/parietal lobes:   Tmax >6s vol 29 cc, CBF <30% vol 5 cc; mismatch vol / penumbra 24 cc.  - C/w Aspirin 81mg  - started Plavix today   - C/w Atorvastatin 80mg daily  - MRI Brain Non-con showing Acute cortical infarcts in the right insular cortex and right frontotemporal operculum.  - TTE: results above   - EP consulted for ILR  - HbA1c 5.4, TSH and LDL 70  - Tele monitoring  -SBP: 120-160  - Physical therapy/Occupational therapy/Physiatry eval  - Fall and Aspiration precautions  - Admit to Stroke Unit and Tele monitoring  - Neurochecks q4 hrs  - Provide stroke education    #UTI  - UA positive  - f.u urine culture  - start Bactrim for 3 days as pt is allergic to Penicillin.    #Elevated trops  - likely due to type 2 MI damand ischemia  - no need to trend trops  - cardio consult appreciated    #DM  - insulin SS for now  - monitor FS    #HTN  - hold HTN meds for now  - permissive HTN    #Polyneuropathy  - c/w gabapentin    #Depression  - c/w cymbalta    #CKD  - monitor cr  - elevated trops, no need to trend now    #COVID pos  - monitor for now    #GERD  - c/w pantoprazole    #Misc:  - DVT Prophylaxis: heparin  - Diet: regular diet as per speech today   - GI Prophylaxis: pantoprazole  - Code status: Full code  - Dispo: 2A CEU       Pendin) EP for ILR    · Assessment	  74 yo F with PMHx of DM, HTN, HLD, CKD, Polyneuropathy, Depression, GERD presented from [facility] for left facial, left hemiparesis, dysarthria, aphasia and neglect on the left. LKW unknown, was found in the floor today 930AM. CTH showed no acute findings, chronic ischemic strokes. CTA Short segment occlusion of a right MCA posterior mid M2 branch. Evidence for corresponding ischemia in the right posterior frontal/parietal lobes. CTP shows a 24 cc penumbra. Pt was a code NI alert and was deemed not a candidate as MRS 3. She also tested positive for COVID. Currently admitted to CEU for stroke workup.       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 Evidence   for corresponding ischemia in the right posterior frontal/parietal lobes:   Tmax >6s vol 29 cc, CBF <30% vol 5 cc; mismatch vol / penumbra 24 cc.  - C/w Aspirin 81mg  - started Plavix today   - C/w Atorvastatin 80mg daily  - MRI Brain Non-con showing Acute cortical infarcts in the right insular cortex and right frontotemporal operculum.  - TTE: results above   - EP consulted for ILR  - HbA1c 5.4, TSH and LDL 70  - Tele monitoring  -SBP: 120-160  - Physical therapy/Occupational therapy/Physiatry eval  - Fall and Aspiration precautions  - Admit to Stroke Unit and Tele monitoring  - Neurochecks q4 hrs  - Provide stroke education    #UTI  - UA positive  - f.u urine culture  - start Bactrim for 3 days as pt is allergic to Penicillin.    #Elevated trops  - likely due to type 2 MI damand ischemia  - no need to trend trops  - cardio consult appreciated    #DM  - insulin SS for now  - monitor FS    #HTN  - hold HTN meds for now  - permissive HTN    #Polyneuropathy  - c/w gabapentin    #Depression  - c/w cymbalta    #CKD  - monitor cr  - elevated trops, no need to trend now    #COVID pos  - monitor for now    #GERD  - c/w pantoprazole    #Misc:  - DVT Prophylaxis: heparin  - Diet: regular diet as per speech today   - GI Prophylaxis: pantoprazole  - Code status: Full code  - Dispo: 2A CEU       Pendin) EP for ILR

## 2023-12-18 NOTE — CONSULT NOTE ADULT - NS ATTEND AMEND GEN_ALL_CORE FT
New CVA    EKG, Tele, CT reviewed  TTE  Discussed need for long-term cardiac rhythm monitoring. Had a detailed discussion about options of [facility]OT+ ILR vs ILR. AFter evaluating pros-cons, patient/family opted for ILR, which is a reasonable approach. We will proceed with ILR placement.  Cardio/neuro f-up

## 2023-12-18 NOTE — OCCUPATIONAL THERAPY INITIAL EVALUATION ADULT - ORIENTATION, REHAB EVAL
Pt reports she is in nursing facility. Oriented to month. Pt oriented to place and date by therapist. Unable to recall at end of treatment session./person

## 2023-12-18 NOTE — PHYSICAL THERAPY INITIAL EVALUATION ADULT - PERTINENT HX OF CURRENT PROBLEM, REHAB EVAL
74 yo F with PMHx of DM, HTN, HLD, CKD, Polyneuropathy, Depression, GERD presented from [facility] for left facial, left hemiparesis, dysarthria, aphasia and neglect on the left. LKW unknown, was found in the floor today 930AM. CTH showed no acute findings, chronic ischemic strokes. CTA Short segment occlusion of a right MCA posterior mid M2 branch. Evidence for corresponding ischemia in the right posterior frontal/parietal lobes. CTP shows a 24 cc penumbra. Pt was a code NI alert and was deemed not a candidate as MRS 3. She also tested positive for COVID. Trop elevated, likely due to CKD but need to trend. EKG Normal as per ED team. Admit to stroke unit for workup. 72 yo F with PMHx of DM, HTN, HLD, CKD, Polyneuropathy, Depression, GERD presented from [facility] for left facial, left hemiparesis, dysarthria, aphasia and neglect on the left. LKW unknown, was found in the floor today 930AM. CTH showed no acute findings, chronic ischemic strokes. CTA Short segment occlusion of a right MCA posterior mid M2 branch. Evidence for corresponding ischemia in the right posterior frontal/parietal lobes. CTP shows a 24 cc penumbra. Pt was a code NI alert and was deemed not a candidate as MRS 3. She also tested positive for COVID. Trop elevated, likely due to CKD but need to trend. EKG Normal as per ED team. Admit to stroke unit for workup.

## 2023-12-18 NOTE — SPEECH LANGUAGE PATHOLOGY EVALUATION - SLP PERTINENT HISTORY OF CURRENT PROBLEM
Pt is a 74 y/o F w/ PMHx: DM, HTN, HLD, CKD, polyneuropathy, depression, GERD, presented from [facility] for L-facial, L-hemiparesis, dysarthria, aphasia and neglect on the left with unknown LKW. CTH showed no acute findings, chronic ischemic strokes. CTA short segment occlusion of a right MCA posterior mid M2 branch. Evidence for corresponding ischemia in the right posterior frontal/parietal lobes. CTP shows a 24 cc penumbra. Pt was a code NI alert and was deemed not a candidate as MRS 3. Pt COVID(+). MRI revealed acute R insular cortex, and R frontotemporal operculum stroke, with stable mild ventriculomegaly. Pt is a 74 y/o F w/ PMHx: DM, HTN, HLD, CKD, polyneuropathy, depression, GERD, presented from [facility] for L-facial, L-hemiparesis, dysarthria, aphasia and neglect on the left with unknown LKW. CTH showed no acute findings, chronic ischemic strokes. CTA short segment occlusion of a right MCA posterior mid M2 branch. Evidence for corresponding ischemia in the right posterior frontal/parietal lobes. CTP shows a 24 cc penumbra. Pt was a code NI alert and was deemed not a candidate as MRS 3. Pt COVID(+). MRI revealed acute R insular cortex, and R frontotemporal operculum stroke, with stable mild ventriculomegaly.

## 2023-12-18 NOTE — PROGRESS NOTE ADULT - SUBJECTIVE AND OBJECTIVE BOX
Neurology Stroke Progress Note    INTERVAL HPI/OVERNIGHT EVENTS:  Patient seen and examined. Today pt is confused but no longer experiencing left sided weakness but recent and remote memory not intact. No OVN events as per nursing.     MEDICATIONS  (STANDING):  acetaminophen     Tablet .. 650 milliGRAM(s) Oral every 12 hours  aspirin enteric coated 81 milliGRAM(s) Oral daily  atorvastatin 80 milliGRAM(s) Oral at bedtime  calcium carbonate 1250 mG  + Vitamin D (OsCal 500 + D) 1 Tablet(s) Oral two times a day  clopidogrel Tablet 75 milliGRAM(s) Oral daily  dextrose 5%. 1000 milliLiter(s) (50 mL/Hr) IV Continuous <Continuous>  dextrose 5%. 1000 milliLiter(s) (100 mL/Hr) IV Continuous <Continuous>  dextrose 50% Injectable 25 Gram(s) IV Push once  dextrose 50% Injectable 12.5 Gram(s) IV Push once  dextrose 50% Injectable 25 Gram(s) IV Push once  DULoxetine 30 milliGRAM(s) Oral daily  gabapentin 100 milliGRAM(s) Oral three times a day  gabapentin 300 milliGRAM(s) Oral daily  glucagon  Injectable 1 milliGRAM(s) IntraMuscular once  heparin   Injectable 5000 Unit(s) SubCutaneous every 12 hours  insulin lispro (ADMELOG) corrective regimen sliding scale   SubCutaneous three times a day before meals  melatonin 10 milliGRAM(s) Oral at bedtime  pantoprazole    Tablet 40 milliGRAM(s) Oral before breakfast  polyethylene glycol 3350 17 Gram(s) Oral daily  senna 2 Tablet(s) Oral at bedtime  sodium chloride 0.9%. 1000 milliLiter(s) (75 mL/Hr) IV Continuous <Continuous>  trimethoprim  160 mG/sulfamethoxazole 800 mG 1 Tablet(s) Oral every 12 hours    MEDICATIONS  (PRN):  dextrose Oral Gel 15 Gram(s) Oral once PRN Blood Glucose LESS THAN 70 milliGRAM(s)/deciliter      Allergies    penicillin (Unknown)    Intolerances        ROS: As per HPI, otherwise negative    Vital Signs Last 24 Hrs  T(C): 36.4 (18 Dec 2023 09:08), Max: 37.4 (17 Dec 2023 15:14)  T(F): 97.5 (18 Dec 2023 09:08), Max: 99.4 (17 Dec 2023 15:14)  HR: 94 (18 Dec 2023 09:08) (94 - 117)  BP: 106/63 (18 Dec 2023 09:08) (106/63 - 122/70)  BP(mean): 86 (18 Dec 2023 04:00) (86 - 90)  RR: 18 (18 Dec 2023 09:08) (16 - 19)  SpO2: 96% (18 Dec 2023 09:08) (92% - 96%)    Parameters below as of 18 Dec 2023 04:00  Patient On (Oxygen Delivery Method): nasal cannula  O2 Flow (L/min): 2      Physical exam:  General: awake and alert, sitting comfortably, no acute distress    Neurologic:  Mental status: awake, alert, oriented to place. Pt unable to correctly answer questions in regards to age and time. Speech is fluent, able to name objects. Follows commands. Attention/concentration intact. No dysarthria, no aphasia.  Cranial nerves:   II: visual fields are full to confrontation. pupils equally round and reactive to light,   III, IV, VI: EOMI without nystagmus  V:  V1-V3 sensation intact,   VII: no facial droop, facie is symmetric with normal eye closure and smile  IX, X: Uvula is midline and soft palate rises symmetrically  XI: head turning and shoulder shrug are intact.  XII: tongue midline  Motor: Normal bulk and tone, strength 5/5 in b/l UE and LE,  strength 5/5. No pronator drift  Sensation: intact to light touch. No neglect.  Coordination: No dysmetria on finger-to-nose and heel-to-shin  Reflexes: downgoing toes bilaterally  Gait: not assessed.     NIH SS 2         LABS:                        9.4    6.25  )-----------( 184      ( 17 Dec 2023 06:46 )             28.3     12-17    140  |  104  |  21<H>  ----------------------------<  100<H>  4.4   |  25  |  1.8<H>    Ca    9.0      17 Dec 2023 06:46  Mg     2.3     12-17    TPro  5.7<L>  /  Alb  3.2<L>  /  TBili  0.5  /  DBili  x   /  AST  17  /  ALT  10  /  AlkPhos  129<H>  12-17      Urinalysis Basic - ( 17 Dec 2023 06:46 )    Color: x / Appearance: x / SG: x / pH: x  Gluc: 100 mg/dL / Ketone: x  / Bili: x / Urobili: x   Blood: x / Protein: x / Nitrite: x   Leuk Esterase: x / RBC: x / WBC x   Sq Epi: x / Non Sq Epi: x / Bacteria: x        RADIOLOGY & ADDITIONAL TESTS:    < from: MR Head No Cont (12.15.23 @ 17:00) >  IMPRESSION:    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 No hemorrhagic transformation.    < end of copied text >    < from: TTE Echo Complete w/o Contrast w/ Doppler (12.18.23 @ 10:49) >  ummary:   1. Left ventricular ejection fraction, by visual estimation, is 60 to   65%.   2. Normal left ventricular size and wall thicknesses, with normal   systolic and diastolic function.    PHYSICIAN INTERPRETATION:  Left Ventricle: Normal left ventricular size and wallthicknesses, with   normal systolic and diastolic function. Left ventricular ejection   fraction, by visual estimation, is 60 to 65%.  Right Ventricle: Normal right ventricular size and function.  Left Atrium: Normal left atrial size.  Right Atrium: Normal right atrial size.  Pericardium: There is no evidence of pericardial effusion.  Mitral Valve: Grossly normal mitral valve with normal leaflet excursion.   No evidence of mitral stenosis. Trivial mitral regurgitation.  Tricuspid Valve: The tricuspid valve is not well visualized. No tricuspid   regurgitation visualized.  Aortic Valve: Trileaflet aortic valve with normal opening. No evidence of   aortic stenosis. No aortic regurgitation.  Pulmonic Valve: No pulmonic regurgitation.  Aorta: The aortic root and ascending aorta are normal in size.    < end of copied text >

## 2023-12-18 NOTE — PHYSICAL THERAPY INITIAL EVALUATION ADULT - GENERAL OBSERVATIONS, REHAB EVAL
[phone] am Chart reviewed. Pt. seen semirecline in bed , in No apparent distress , + telemetry , IV meds ongoing , Prima fit device , O2 at 2L/min nc , Pt. alert and oriented X 1, confused but pleasant, follows multi step instructions, +airborne precautions .

## 2023-12-18 NOTE — PROGRESS NOTE ADULT - NS ATTEND AMEND GEN_ALL_CORE FT
Pt is a 72 yo F with PMHx of DM, HTN, HLD, CKD, who presented from NH with left hemiparesis and encephalopathy (likely toxic metabolic due to covid infection plus acute stroke). Covid +    Impr: right insular acute ischemic stroke, inferior branch oR M2  Etiology: ESUS  Continue DAPT/high intensity statin  Recommend ILR placement  PT/OT/ST, tele, -160, dispo planning Pt is a 74 yo F with PMHx of DM, HTN, HLD, CKD, who presented from NH with left hemiparesis and encephalopathy (likely toxic metabolic due to covid infection plus acute stroke). Covid +    Impr: right insular acute ischemic stroke, inferior branch oR M2  Etiology: ESUS  Continue DAPT/high intensity statin  Recommend ILR placement  PT/OT/ST, tele, -160, dispo planning

## 2023-12-18 NOTE — PROGRESS NOTE ADULT - ASSESSMENT
73F PMHx of DM, HTN, HLD, CKD, Polyneuropathy, Depression, GERD presented from [facility] for left facial, left hemiparesis, dysarthria, aphasia and neglect on the left with unknown LKW CTH showed no acute findings, chronic ischemic strokes. CTA Short segment occlusion of a right MCA posterior mid M2 branch. Evidence for corresponding ischemia in the right posterior frontal/parietal lobes. CTP shows a 24 cc penumbra. Pt was a code NI alert and was deemed not a candidate as MRS 3. She also tested positive for COVID. Admitted to stroke unit for workup. MRI revealed acute R insular cortex, and R frontotemporal operculum stroke, with stable mild ventriculomegaly. currently being monitored in SDU    Acute R insular cortex ischemic stroke  - s/p CTH/CTA H/N and MRI  - on asa and plavix, atorvastatin  - pending TTE  - PT/OT/s/s  - management per primary team    COVID 19 infection - chest xray unremarkable, no need for covid therapeutics at this time    + UA  - no fevers or leukocytosis. 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 CTA Short segment occlusion of a right MCA posterior mid M2 branch. Evidence for corresponding ischemia in the right posterior frontal/parietal lobes. CTP shows a 24 cc penumbra. Pt was a code NI alert and was deemed not a candidate as MRS 3. She also tested positive for COVID. Admitted to stroke unit for workup. MRI revealed acute R insular cortex, and R frontotemporal operculum stroke, with stable mild ventriculomegaly. currently being monitored in SDU    Acute R insular cortex ischemic stroke  - s/p CTH/CTA H/N and MRI  - on asa and plavix, atorvastatin  - pending TTE  - PT/OT/s/s  - management per primary team    COVID 19 infection - chest xray unremarkable, no need for covid therapeutics at this time    + UA  - no fevers or leukocytosis. 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

## 2023-12-18 NOTE — OCCUPATIONAL THERAPY INITIAL EVALUATION ADULT - PERTINENT HX OF CURRENT PROBLEM, REHAB EVAL
72 yo F with PMHx of DM, HTN, HLD, CKD, Polyneuropathy, Depression, GERD presented from [facility] for left facial, left hemiparesis, dysarthria, aphasia and neglect on the left. LKW unknown, was found in the floor today 930AM. CTH showed no acute findings, chronic ischemic strokes. CTA Short segment occlusion of a right MCA posterior mid M2 branch. Evidence for corresponding ischemia in the right posterior frontal/parietal lobes. CTP shows a 24 cc penumbra. Pt was a code NI alert and was deemed not a candidate as MRS 3. She also tested positive for COVID. Admit to stroke unit for workup. 74 yo F with PMHx of DM, HTN, HLD, CKD, Polyneuropathy, Depression, GERD presented from [facility] for left facial, left hemiparesis, dysarthria, aphasia and neglect on the left. LKW unknown, was found in the floor today 930AM. CTH showed no acute findings, chronic ischemic strokes. CTA Short segment occlusion of a right MCA posterior mid M2 branch. Evidence for corresponding ischemia in the right posterior frontal/parietal lobes. CTP shows a 24 cc penumbra. Pt was a code NI alert and was deemed not a candidate as MRS 3. She also tested positive for COVID. Admit to stroke unit for workup.

## 2023-12-18 NOTE — CONSULT NOTE ADULT - SUBJECTIVE AND OBJECTIVE BOX
CCU CONSULTATION    Patient is a 73y old  Female who presents with a chief complaint of left sided weakness (18 Dec 2023 14:10)      HPI:  72 yo F with PMHx of DM, HTN, HLD, CKD, Polyneuropathy, Depression, GERD presented from [facility] for left facial, left hemiparesis, dysarthria, aphasia and neglect on the left. LKW unknown, was found in the floor today 930AM. CTH showed no acute findings, chronic ischemic strokes. CTA Short segment occlusion of a right MCA posterior mid M2 branch. Evidence for corresponding ischemia in the right posterior frontal/parietal lobes. CTP shows a 24 cc penumbra. Pt was a code NI alert and was deemed not a candidate as MRS 3. She also tested positive for COVID. Trop elevated, likely due to CKD but need to trend. EKG Normal as per ED team. Admit to stroke unit for workup. (15 Dec 2023 17:12)      Allergies    penicillin (Unknown)    Intolerances        PAST MEDICAL & SURGICAL HISTORY:  DM (diabetes mellitus)      HLD (hyperlipidemia)      Depression, major      Chronic kidney disease, unspecified CKD stage      Polyneuropathy        MEDICATIONS    aspirin enteric coated 81 milliGRAM(s) Oral daily  clopidogrel Tablet 75 milliGRAM(s) Oral daily  heparin   Injectable 5000 Unit(s) SubCutaneous every 12 hours    trimethoprim  160 mG/sulfamethoxazole 800 mG 1 Tablet(s) Oral every 12 hours      acetaminophen     Tablet .. 650 milliGRAM(s) Oral every 12 hours  DULoxetine 30 milliGRAM(s) Oral daily  gabapentin 100 milliGRAM(s) Oral three times a day  gabapentin 300 milliGRAM(s) Oral daily  melatonin 10 milliGRAM(s) Oral at bedtime    pantoprazole    Tablet 40 milliGRAM(s) Oral before breakfast  polyethylene glycol 3350 17 Gram(s) Oral daily  senna 2 Tablet(s) Oral at bedtime    atorvastatin 80 milliGRAM(s) Oral at bedtime  dextrose 50% Injectable 25 Gram(s) IV Push once  dextrose 50% Injectable 12.5 Gram(s) IV Push once  dextrose 50% Injectable 25 Gram(s) IV Push once  dextrose Oral Gel 15 Gram(s) Oral once PRN  glucagon  Injectable 1 milliGRAM(s) IntraMuscular once  insulin lispro (ADMELOG) corrective regimen sliding scale   SubCutaneous three times a day before meals    calcium carbonate 1250 mG  + Vitamin D (OsCal 500 + D) 1 Tablet(s) Oral two times a day  dextrose 5%. 1000 milliLiter(s) IV Continuous <Continuous>  dextrose 5%. 1000 milliLiter(s) IV Continuous <Continuous>  sodium chloride 0.9%. 1000 milliLiter(s) IV Continuous <Continuous>              Drug Dosing Weight    Weight (kg): 74.4 (17 Dec 2023 02:48)  Daily     Daily             LABORATORY VALUES	 	                          9.4    6.25  )-----------( 184      ( 17 Dec 2023 06:46 )             28.3       12-17    140  |  104  |  21<H>  ----------------------------<  100<H>  4.4   |  25  |  1.8<H>    Ca    9.0      17 Dec 2023 06:46  Mg     2.3     12-17    TPro  5.7<L>  /  Alb  3.2<L>  /  TBili  0.5  /  DBili  x   /  AST  17  /  ALT  10  /  AlkPhos  129<H>  12-17    LIVER FUNCTIONS - ( 17 Dec 2023 06:46 )  Alb: 3.2 g/dL / Pro: 5.7 g/dL / ALK PHOS: 129 U/L / ALT: 10 U/L / AST: 17 U/L / GGT: x           12-16 @ 06:10  Cholesterol, Serum - 131  Direct LDL- --  HDL Cholesterol, Serum- 41  Triglycerides, Serum- 101    Thyroid Stimulating Hormone, Serum: 2.84 uIU/mL (12-16 @ 06:10)  Urinalysis Basic - ( 17 Dec 2023 06:46 )    Color: x / Appearance: x / SG: x / pH: x  Gluc: 100 mg/dL / Ketone: x  / Bili: x / Urobili: x   Blood: x / Protein: x / Nitrite: x   Leuk Esterase: x / RBC: x / WBC x   Sq Epi: x / Non Sq Epi: x / Bacteria: x      CAPILLARY BLOOD GLUCOSE      POCT Blood Glucose.: 127 mg/dL (18 Dec 2023 10:45)    TELEMETRY: NSR, episodes of sinus tachycardia    ECG:  	Sinus tachycardia	    PREVIOUS DIAGNOSTIC TESTING:    [x ] Echocardiogram: EF 60-65, nl LVSF    ICU Vital Signs Last 24 Hrs  T(C): 36.4 (18 Dec 2023 09:08), Max: 37.2 (17 Dec 2023 20:30)  T(F): 97.5 (18 Dec 2023 09:08), Max: 98.9 (17 Dec 2023 20:30)  HR: 94 (18 Dec 2023 09:08) (94 - 117)  BP: 106/63 (18 Dec 2023 09:08) (106/63 - 122/70)  BP(mean): 86 (18 Dec 2023 04:00) (86 - 90)  ABP: --  ABP(mean): --  RR: 18 (18 Dec 2023 09:08) (16 - 19)  SpO2: 96% (18 Dec 2023 09:08) (92% - 96%)    O2 Parameters below as of 18 Dec 2023 04:00  Patient On (Oxygen Delivery Method): nasal cannula  O2 Flow (L/min): 2      Appearance: NAD, no distress  HEENT: Moist Mucous Membranes, Anicteric, PERRL, EOMI  Cardiovascular: Regular rate and rhythm, Normal S1 S2  Respiratory: Good air entry bilaterally  Gastrointestinal:  Soft, Non-tender, + BS  Neurologic: Alert, slightly confused  Musculoskeletal: No clubbing, No cyanosis, No edema                                         CCU CONSULTATION    Patient is a 73y old  Female who presents with a chief complaint of left sided weakness (18 Dec 2023 14:10)      HPI:  72 yo F with PMHx of DM, HTN, HLD, CKD, Polyneuropathy, Depression, GERD presented from [facility] for left facial, left hemiparesis, dysarthria, aphasia and neglect on the left. LKW unknown, was found in the floor today 930AM. CTH showed no acute findings, chronic ischemic strokes. CTA Short segment occlusion of a right MCA posterior mid M2 branch. Evidence for corresponding ischemia in the right posterior frontal/parietal lobes. CTP shows a 24 cc penumbra. Pt was a code NI alert and was deemed not a candidate as MRS 3. She also tested positive for COVID. Trop elevated, likely due to CKD but need to trend. EKG Normal as per ED team. Admit to stroke unit for workup. (15 Dec 2023 17:12)      Allergies    penicillin (Unknown)    Intolerances        PAST MEDICAL & SURGICAL HISTORY:  DM (diabetes mellitus)      HLD (hyperlipidemia)      Depression, major      Chronic kidney disease, unspecified CKD stage      Polyneuropathy        MEDICATIONS    aspirin enteric coated 81 milliGRAM(s) Oral daily  clopidogrel Tablet 75 milliGRAM(s) Oral daily  heparin   Injectable 5000 Unit(s) SubCutaneous every 12 hours    trimethoprim  160 mG/sulfamethoxazole 800 mG 1 Tablet(s) Oral every 12 hours      acetaminophen     Tablet .. 650 milliGRAM(s) Oral every 12 hours  DULoxetine 30 milliGRAM(s) Oral daily  gabapentin 100 milliGRAM(s) Oral three times a day  gabapentin 300 milliGRAM(s) Oral daily  melatonin 10 milliGRAM(s) Oral at bedtime    pantoprazole    Tablet 40 milliGRAM(s) Oral before breakfast  polyethylene glycol 3350 17 Gram(s) Oral daily  senna 2 Tablet(s) Oral at bedtime    atorvastatin 80 milliGRAM(s) Oral at bedtime  dextrose 50% Injectable 25 Gram(s) IV Push once  dextrose 50% Injectable 12.5 Gram(s) IV Push once  dextrose 50% Injectable 25 Gram(s) IV Push once  dextrose Oral Gel 15 Gram(s) Oral once PRN  glucagon  Injectable 1 milliGRAM(s) IntraMuscular once  insulin lispro (ADMELOG) corrective regimen sliding scale   SubCutaneous three times a day before meals    calcium carbonate 1250 mG  + Vitamin D (OsCal 500 + D) 1 Tablet(s) Oral two times a day  dextrose 5%. 1000 milliLiter(s) IV Continuous <Continuous>  dextrose 5%. 1000 milliLiter(s) IV Continuous <Continuous>  sodium chloride 0.9%. 1000 milliLiter(s) IV Continuous <Continuous>              Drug Dosing Weight    Weight (kg): 74.4 (17 Dec 2023 02:48)  Daily     Daily             LABORATORY VALUES	 	                          9.4    6.25  )-----------( 184      ( 17 Dec 2023 06:46 )             28.3       12-17    140  |  104  |  21<H>  ----------------------------<  100<H>  4.4   |  25  |  1.8<H>    Ca    9.0      17 Dec 2023 06:46  Mg     2.3     12-17    TPro  5.7<L>  /  Alb  3.2<L>  /  TBili  0.5  /  DBili  x   /  AST  17  /  ALT  10  /  AlkPhos  129<H>  12-17    LIVER FUNCTIONS - ( 17 Dec 2023 06:46 )  Alb: 3.2 g/dL / Pro: 5.7 g/dL / ALK PHOS: 129 U/L / ALT: 10 U/L / AST: 17 U/L / GGT: x           12-16 @ 06:10  Cholesterol, Serum - 131  Direct LDL- --  HDL Cholesterol, Serum- 41  Triglycerides, Serum- 101    Thyroid Stimulating Hormone, Serum: 2.84 uIU/mL (12-16 @ 06:10)  Urinalysis Basic - ( 17 Dec 2023 06:46 )    Color: x / Appearance: x / SG: x / pH: x  Gluc: 100 mg/dL / Ketone: x  / Bili: x / Urobili: x   Blood: x / Protein: x / Nitrite: x   Leuk Esterase: x / RBC: x / WBC x   Sq Epi: x / Non Sq Epi: x / Bacteria: x      CAPILLARY BLOOD GLUCOSE      POCT Blood Glucose.: 127 mg/dL (18 Dec 2023 10:45)    TELEMETRY: NSR, episodes of sinus tachycardia    ECG:  	Sinus tachycardia	    PREVIOUS DIAGNOSTIC TESTING:    [x ] Echocardiogram: EF 60-65, nl LVSF    ICU Vital Signs Last 24 Hrs  T(C): 36.4 (18 Dec 2023 09:08), Max: 37.2 (17 Dec 2023 20:30)  T(F): 97.5 (18 Dec 2023 09:08), Max: 98.9 (17 Dec 2023 20:30)  HR: 94 (18 Dec 2023 09:08) (94 - 117)  BP: 106/63 (18 Dec 2023 09:08) (106/63 - 122/70)  BP(mean): 86 (18 Dec 2023 04:00) (86 - 90)  ABP: --  ABP(mean): --  RR: 18 (18 Dec 2023 09:08) (16 - 19)  SpO2: 96% (18 Dec 2023 09:08) (92% - 96%)    O2 Parameters below as of 18 Dec 2023 04:00  Patient On (Oxygen Delivery Method): nasal cannula  O2 Flow (L/min): 2      Appearance: NAD, no distress  HEENT: Moist Mucous Membranes, Anicteric, PERRL, EOMI  Cardiovascular: Regular rate and rhythm, Normal S1 S2  Respiratory: Good air entry bilaterally  Gastrointestinal:  Soft, Non-tender, + BS  Neurologic: Alert, slightly confused  Musculoskeletal: No clubbing, No cyanosis, No edema

## 2023-12-18 NOTE — PHYSICAL THERAPY INITIAL EVALUATION ADULT - LIVES WITH, PROFILE
Pt lives with hisband in private house , unable to recall home set up. Pt has been in [facility] X 2 mos for rehab/spouse

## 2023-12-18 NOTE — PROGRESS NOTE ADULT - SUBJECTIVE AND OBJECTIVE BOX
ANTONY QUIROZ  73y Female    CHIEF COMPLAINT:    Patient is a 73y old  Female who presents with a chief complaint of left sided weakness (17 Dec 2023 14:51)    INTERVAL HPI/OVERNIGHT EVENTS:    Patient seen and examined. No acute events overnight. intermittently requiring 2l NC     ROS: All other systems are negative.    Vital Signs:    T(F): 97.5 (12-18-23 @ 09:08), Max: 99.4 (12-17-23 @ 15:14)  HR: 94 (12-18-23 @ 09:08) (94 - 117)  BP: 106/63 (12-18-23 @ 09:08) (106/63 - 122/70)  RR: 18 (12-18-23 @ 09:08) (16 - 19)  SpO2: 96% (12-18-23 @ 09:08) (92% - 96%)    17 Dec 2023 07:01  -  18 Dec 2023 07:00  --------------------------------------------------------  IN: 600 mL / OUT: 400 mL / NET: 200 mL    POCT Blood Glucose.: 127 mg/dL (18 Dec 2023 10:45)  POCT Blood Glucose.: 103 mg/dL (18 Dec 2023 07:33)  POCT Blood Glucose.: 113 mg/dL (17 Dec 2023 17:04)    PHYSICAL EXAM:    GENERAL:  NAD  SKIN: No rashes or lesions  HEENT: Atraumatic. Normocephalic.    NECK: Supple, No JVD.   PULMONARY: CTA B/L. No wheezing. No rales  CVS: Normal S1, S2. Rate and Rhythm are regular.   ABDOMEN/GI: Soft, Nontender, Nondistended  MSK:  No clubbing or cyanosis   NEUROLOGIC: moves all extremities   PSYCH: Awake and alert     Consultant(s) Notes Reviewed:  [x ] YES  [ ] NO  Care Discussed with Consultants/Other Providers [ x] YES  [ ] NO    LABS:                        9.4    6.25  )-----------( 184      ( 17 Dec 2023 06:46 )             28.3     140  |  104  |  21<H>  ----------------------------<  100<H>  4.4   |  25  |  1.8<H>    Ca    9.0      17 Dec 2023 06:46  Mg     2.3     12-17    TPro  5.7<L>  /  Alb  3.2<L>  /  TBili  0.5  /  DBili  x   /  AST  17  /  ALT  10  /  AlkPhos  129<H>  12-17    RADIOLOGY & ADDITIONAL TESTS:  Imaging or report Personally Reviewed:  [x] YES  [ ] NO  EKG reviewed: [x] YES  [ ] NO    Medications:  Standing  acetaminophen     Tablet .. 650 milliGRAM(s) Oral every 12 hours  aspirin enteric coated 81 milliGRAM(s) Oral daily  atorvastatin 80 milliGRAM(s) Oral at bedtime  calcium carbonate 1250 mG  + Vitamin D (OsCal 500 + D) 1 Tablet(s) Oral two times a day  clopidogrel Tablet 75 milliGRAM(s) Oral daily  dextrose 5%. 1000 milliLiter(s) IV Continuous <Continuous>  dextrose 5%. 1000 milliLiter(s) IV Continuous <Continuous>  dextrose 50% Injectable 25 Gram(s) IV Push once  dextrose 50% Injectable 12.5 Gram(s) IV Push once  dextrose 50% Injectable 25 Gram(s) IV Push once  DULoxetine 30 milliGRAM(s) Oral daily  gabapentin 100 milliGRAM(s) Oral three times a day  gabapentin 300 milliGRAM(s) Oral daily  glucagon  Injectable 1 milliGRAM(s) IntraMuscular once  heparin   Injectable 5000 Unit(s) SubCutaneous every 12 hours  insulin lispro (ADMELOG) corrective regimen sliding scale   SubCutaneous three times a day before meals  melatonin 10 milliGRAM(s) Oral at bedtime  pantoprazole    Tablet 40 milliGRAM(s) Oral before breakfast  polyethylene glycol 3350 17 Gram(s) Oral daily  senna 2 Tablet(s) Oral at bedtime  sodium chloride 0.9%. 1000 milliLiter(s) IV Continuous <Continuous>  trimethoprim  160 mG/sulfamethoxazole 800 mG 1 Tablet(s) Oral every 12 hours    PRN Meds  dextrose Oral Gel 15 Gram(s) Oral once PRN

## 2023-12-18 NOTE — OCCUPATIONAL THERAPY INITIAL EVALUATION ADULT - ADDITIONAL COMMENTS
Pt stating she lives in pvt home with spouse. [facility] the past 2 [facility]. Reports she was getting assistance from staff for transfers and adls at nursing facility and was independent at home. Difficult to assess level of assistance 2/2 impaired cognitive function. Pt stating she lives in pvt home with spouse. For the past 2 months [facility]. Reports she was getting assistance from staff for transfers and adls at nursing facility and was independent at home. Difficult to assess level of assistance 2/2 impaired cognitive function.

## 2023-12-18 NOTE — SWALLOW BEDSIDE ASSESSMENT ADULT - SLP PERTINENT HISTORY OF CURRENT PROBLEM
Pt is a 74 y/o F w/ PMHx: DM, HTN, HLD, CKD, polyneuropathy, depression, GERD, presented from [facility] for L-facial, L-hemiparesis, dysarthria, aphasia and neglect on the left with unknown LKW. CTH showed no acute findings, chronic ischemic strokes. CTA short segment occlusion of a right MCA posterior mid M2 branch. Evidence for corresponding ischemia in the right posterior frontal/parietal lobes. CTP shows a 24 cc penumbra. Pt was a code NI alert and was deemed not a candidate as MRS 3. Pt COVID(+). MRI revealed acute R insular cortex, and R frontotemporal operculum stroke, with stable mild ventriculomegaly. Pt is a 74 y/o F w/ PMHx: DM, HTN, HLD, CKD, polyneuropathy, depression, GERD, presented from [facility] for L-facial, L-hemiparesis, dysarthria, aphasia and neglect on the left with unknown LKW. CTH showed no acute findings, chronic ischemic strokes. CTA short segment occlusion of a right MCA posterior mid M2 branch. Evidence for corresponding ischemia in the right posterior frontal/parietal lobes. CTP shows a 24 cc penumbra. Pt was a code NI alert and was deemed not a candidate as MRS 3. Pt COVID(+). MRI revealed acute R insular cortex, and R frontotemporal operculum stroke, with stable mild ventriculomegaly.

## 2023-12-18 NOTE — OCCUPATIONAL THERAPY INITIAL EVALUATION ADULT - LIGHT TOUCH SENSATION, LUE, REHAB EVAL
unable to formally assess 2/2 impaired cognition however pt observed dropping items out of hand and not noticing.

## 2023-12-18 NOTE — PHYSICAL THERAPY INITIAL EVALUATION ADULT - ADDITIONAL COMMENTS
Pt has been assisted in [facility], but reported she normally walks with a rolling walker at home Pt has been assisted in [facility], but reported she normally walks with a rolling walker at home

## 2023-12-18 NOTE — DISCHARGE NOTE NURSING/CASE MANAGEMENT/SOCIAL WORK - PATIENT PORTAL LINK FT
You can access the FollowMyHealth Patient Portal offered by [facility] by registering at the following website: [email or web address]. By joining [email or web address]’s FollowMyHealth portal, you will also be able to view your health information using other applications (apps) compatible with our system. You can access the FollowMyHealth Patient Portal offered by [facility] by registering at the following website: [email or web address]. By joining [employer]’s FollowMyHealth portal, you will also be able to view your health information using other applications (apps) compatible with our system.

## 2023-12-18 NOTE — CONSULT NOTE ADULT - ASSESSMENT
#Acute R Insular Cortex and R frontotemporal stroke, multiple chronic infarcts  #DM  #HTN, HLD  #CKD  #COVID pos    Recommendations:  - ILR discussed with pt and  - both agreeable  - Pt currently on isolation for COVID, ILR can be placed when pt off isolation precautions  - If discharged prior to end of precautions, ILR can be done as outpatient

## 2023-12-18 NOTE — OCCUPATIONAL THERAPY INITIAL EVALUATION ADULT - LONG TERM MEMORY, REHAB EVAL
Follow up Clinic Note        Patient Juliane Walters is a 71 y.o. female     Chief complaints   Chief Complaint   Patient presents with    Follow-up    Dizziness     Possible  inner ear       Subjectve:     HPI     Dizziness      Additional comments: Possible  inner ear          Last edited by Patricia Banks MA on 2/28/2022  2:27 PM. (History)         Patient complaining of dizziness. States she had an episode of dizziness last week one morning and then again this morning. Has some sinus problem for week or so ago. Wants to get it checked out. NO fever. No cough. Needs lab work while here. Also having this pain behing her left knee on and off. No pain today. No radiation-no known injury.       Current Outpatient Medications:     aspirin (ECOTRIN) 81 MG EC tablet, Take 81 mg by mouth once daily., Disp: , Rfl:     blood sugar diagnostic, disc Strp, 1 strip by Misc.(Non-Drug; Combo Route) route 2 (two) times daily. Uses True Metrix glucose test strips, Disp: , Rfl:     diclofenac sodium (VOLTAREN) 1 % Gel, Apply 2 g topically once daily. Apply to left wrist, Disp: 100 g, Rfl: 0    gabapentin (NEURONTIN) 300 MG capsule, Take 1 capsule (300 mg total) by mouth every evening., Disp: 30 capsule, Rfl: 0    hydrocortisone 2.5 % cream, Apply topically 2 (two) times daily., Disp: 20 g, Rfl: 0    metFORMIN (GLUCOPHAGE) 500 MG tablet, Take 1 tablet (500 mg total) by mouth 2 (two) times daily., Disp: 180 tablet, Rfl: 2    betamethasone dipropionate 0.05 % cream, Apply topically 2 (two) times daily. for 15 days, Disp: 15 g, Rfl: 1    cefdinir (OMNICEF) 300 MG capsule, Take 1 capsule (300 mg total) by mouth 2 (two) times daily. for 10 days, Disp: 20 capsule, Rfl: 0    cetirizine (ZYRTEC) 10 MG tablet, Take 1 tablet (10 mg total) by mouth every evening., Disp: 90 tablet, Rfl: 1    fluticasone propionate (FLONASE) 50 mcg/actuation nasal spray, 1 spray (50 mcg total) by Each Nostril route 2 (two) times a  "day., Disp: 18 g, Rfl: 3    olmesartan-hydrochlorothiazide (BENICAR HCT) 40-12.5 mg Tab, Take 1 tablet by mouth once daily., Disp: 90 tablet, Rfl: 1    rosuvastatin (CRESTOR) 5 MG tablet, Take 1 tablet (5 mg total) by mouth once daily., Disp: 90 tablet, Rfl: 1     Family History   Problem Relation Age of Onset    Hypertension Mother     Diabetes Mother     Hypertension Father         Past Medical History:   Diagnosis Date    Diabetes mellitus, type 2     Hyperlipidemia     Hypertension         History reviewed. No pertinent surgical history.     Social History     Socioeconomic History    Marital status:    Tobacco Use    Smoking status: Never Smoker    Smokeless tobacco: Never Used   Substance and Sexual Activity    Alcohol use: Never    Drug use: Never    Sexual activity: Not Currently        Review of Systems   Constitutional: Negative for activity change, appetite change, chills and fever.   HENT: Positive for congestion and sinus pressure. Negative for ear pain, postnasal drip and sore throat.    Eyes: Negative for pain, discharge, redness and itching.   Respiratory: Negative for cough, chest tightness and shortness of breath.    Cardiovascular: Negative for chest pain and leg swelling.   Gastrointestinal: Negative for abdominal pain, diarrhea, nausea and vomiting.   Musculoskeletal: Positive for arthralgias and myalgias. Negative for gait problem.   Skin: Negative for color change.   Neurological: Positive for dizziness. Negative for weakness.   Psychiatric/Behavioral: Negative for behavioral problems and sleep disturbance.        /62 (BP Location: Left arm, Patient Position: Sitting, BP Method: X-Large (Automatic))   Pulse 87   Temp 97.2 °F (36.2 °C)   Ht 5' 5" (1.651 m)   Wt 92.4 kg (203 lb 12.8 oz)   SpO2 99%   BMI 33.91 kg/m²      Physical Exam  Constitutional:       General: She is not in acute distress.     Appearance: Normal appearance.   HENT:      Head: Normocephalic. "      Right Ear: Ear canal and external ear normal.      Left Ear: Ear canal and external ear normal.      Ears:      Comments: Dull TMs     Nose: Congestion present.      Mouth/Throat:      Mouth: Mucous membranes are moist.      Pharynx: Posterior oropharyngeal erythema present.   Eyes:      Pupils: Pupils are equal, round, and reactive to light.   Cardiovascular:      Rate and Rhythm: Normal rate and regular rhythm.      Heart sounds: Normal heart sounds.   Pulmonary:      Effort: Pulmonary effort is normal.      Breath sounds: Normal breath sounds.   Abdominal:      General: Abdomen is flat. Bowel sounds are normal.      Palpations: Abdomen is soft.   Musculoskeletal:         General: Tenderness present. Normal range of motion.      Cervical back: Neck supple.      Comments: Has about 2.5x1.5 cm swollen area behind the left knee - possible cystic lesion   Skin:     General: Skin is warm.   Neurological:      General: No focal deficit present.      Mental Status: She is alert.   Psychiatric:         Mood and Affect: Mood normal.         Behavior: Behavior normal.          Problem List Items Addressed This Visit        Ophtho    Ptosis of both eyelids    Current Assessment & Plan     chronic              ENT    URI (upper respiratory infection)    Relevant Medications    cetirizine (ZYRTEC) 10 MG tablet    fluticasone propionate (FLONASE) 50 mcg/actuation nasal spray    cefdinir (OMNICEF) 300 MG capsule       Cardiac/Vascular    Essential hypertension       Endocrine    Type 2 diabetes mellitus, without long-term current use of insulin    Relevant Orders    CBC Auto Differential    Comprehensive Metabolic Panel    Hemoglobin A1C    Urinalysis    Microalbumin/Creatinine Ratio, Urine    Lipid Panel       Orthopedic    Chronic pain of left knee    Current Assessment & Plan     Pt wants to wait for any further management.               Other    Dizziness - Primary    Current Assessment & Plan     Could be from ear  problem, but will do lab work and f/u. If symptoms persists or worsen needs to go to er immediately.              Other Visit Diagnoses     Other fatigue        Relevant Orders    TSH            mod impairment/impaired

## 2023-12-19 LAB
ALBUMIN SERPL ELPH-MCNC: 3.4 G/DL — LOW (ref 3.5–5.2)
ALBUMIN SERPL ELPH-MCNC: 3.4 G/DL — LOW (ref 3.5–5.2)
ALP SERPL-CCNC: 130 U/L — HIGH (ref 30–115)
ALP SERPL-CCNC: 130 U/L — HIGH (ref 30–115)
ALT FLD-CCNC: 9 U/L — SIGNIFICANT CHANGE UP (ref 0–41)
ALT FLD-CCNC: 9 U/L — SIGNIFICANT CHANGE UP (ref 0–41)
ANION GAP SERPL CALC-SCNC: 9 MMOL/L — SIGNIFICANT CHANGE UP (ref 7–14)
ANION GAP SERPL CALC-SCNC: 9 MMOL/L — SIGNIFICANT CHANGE UP (ref 7–14)
AST SERPL-CCNC: 17 U/L — SIGNIFICANT CHANGE UP (ref 0–41)
AST SERPL-CCNC: 17 U/L — SIGNIFICANT CHANGE UP (ref 0–41)
BILIRUB SERPL-MCNC: 0.4 MG/DL — SIGNIFICANT CHANGE UP (ref 0.2–1.2)
BILIRUB SERPL-MCNC: 0.4 MG/DL — SIGNIFICANT CHANGE UP (ref 0.2–1.2)
BUN SERPL-MCNC: 21 MG/DL — HIGH (ref 10–20)
BUN SERPL-MCNC: 21 MG/DL — HIGH (ref 10–20)
CALCIUM SERPL-MCNC: 8.8 MG/DL — SIGNIFICANT CHANGE UP (ref 8.4–10.5)
CALCIUM SERPL-MCNC: 8.8 MG/DL — SIGNIFICANT CHANGE UP (ref 8.4–10.5)
CHLORIDE SERPL-SCNC: 105 MMOL/L — SIGNIFICANT CHANGE UP (ref 98–110)
CHLORIDE SERPL-SCNC: 105 MMOL/L — SIGNIFICANT CHANGE UP (ref 98–110)
CO2 SERPL-SCNC: 22 MMOL/L — SIGNIFICANT CHANGE UP (ref 17–32)
CO2 SERPL-SCNC: 22 MMOL/L — SIGNIFICANT CHANGE UP (ref 17–32)
CREAT SERPL-MCNC: 2 MG/DL — HIGH (ref 0.7–1.5)
CREAT SERPL-MCNC: 2 MG/DL — HIGH (ref 0.7–1.5)
EGFR: 26 ML/MIN/1.73M2 — LOW
EGFR: 26 ML/MIN/1.73M2 — LOW
GLUCOSE SERPL-MCNC: 105 MG/DL — HIGH (ref 70–99)
GLUCOSE SERPL-MCNC: 105 MG/DL — HIGH (ref 70–99)
POTASSIUM SERPL-MCNC: 3.9 MMOL/L — SIGNIFICANT CHANGE UP (ref 3.5–5)
POTASSIUM SERPL-MCNC: 3.9 MMOL/L — SIGNIFICANT CHANGE UP (ref 3.5–5)
POTASSIUM SERPL-SCNC: 3.9 MMOL/L — SIGNIFICANT CHANGE UP (ref 3.5–5)
POTASSIUM SERPL-SCNC: 3.9 MMOL/L — SIGNIFICANT CHANGE UP (ref 3.5–5)
PROT SERPL-MCNC: 5.8 G/DL — LOW (ref 6–8)
PROT SERPL-MCNC: 5.8 G/DL — LOW (ref 6–8)
SODIUM SERPL-SCNC: 136 MMOL/L — SIGNIFICANT CHANGE UP (ref 135–146)
SODIUM SERPL-SCNC: 136 MMOL/L — SIGNIFICANT CHANGE UP (ref 135–146)

## 2023-12-19 PROCEDURE — 99232 SBSQ HOSP IP/OBS MODERATE 35: CPT

## 2023-12-19 PROCEDURE — 99231 SBSQ HOSP IP/OBS SF/LOW 25: CPT

## 2023-12-19 RX ORDER — SODIUM CHLORIDE 9 MG/ML
250 INJECTION INTRAMUSCULAR; INTRAVENOUS; SUBCUTANEOUS ONCE
Refills: 0 | Status: COMPLETED | OUTPATIENT
Start: 2023-12-19 | End: 2023-12-19

## 2023-12-19 RX ADMIN — POLYETHYLENE GLYCOL 3350 17 GRAM(S): 17 POWDER, FOR SOLUTION ORAL at 11:46

## 2023-12-19 RX ADMIN — CHLORHEXIDINE GLUCONATE 1 APPLICATION(S): 213 SOLUTION TOPICAL at 11:47

## 2023-12-19 RX ADMIN — Medication 81 MILLIGRAM(S): at 11:47

## 2023-12-19 RX ADMIN — PANTOPRAZOLE SODIUM 40 MILLIGRAM(S): 20 TABLET, DELAYED RELEASE ORAL at 05:05

## 2023-12-19 RX ADMIN — SODIUM CHLORIDE 500 MILLILITER(S): 9 INJECTION INTRAMUSCULAR; INTRAVENOUS; SUBCUTANEOUS at 02:28

## 2023-12-19 RX ADMIN — Medication 1 TABLET(S): at 18:03

## 2023-12-19 RX ADMIN — GABAPENTIN 100 MILLIGRAM(S): 400 CAPSULE ORAL at 14:54

## 2023-12-19 RX ADMIN — ATORVASTATIN CALCIUM 80 MILLIGRAM(S): 80 TABLET, FILM COATED ORAL at 21:49

## 2023-12-19 RX ADMIN — Medication 650 MILLIGRAM(S): at 05:03

## 2023-12-19 RX ADMIN — Medication 1 TABLET(S): at 05:04

## 2023-12-19 RX ADMIN — HEPARIN SODIUM 5000 UNIT(S): 5000 INJECTION INTRAVENOUS; SUBCUTANEOUS at 05:04

## 2023-12-19 RX ADMIN — SENNA PLUS 2 TABLET(S): 8.6 TABLET ORAL at 21:49

## 2023-12-19 RX ADMIN — Medication 1 TABLET(S): at 05:03

## 2023-12-19 RX ADMIN — DULOXETINE HYDROCHLORIDE 30 MILLIGRAM(S): 30 CAPSULE, DELAYED RELEASE ORAL at 11:47

## 2023-12-19 RX ADMIN — GABAPENTIN 100 MILLIGRAM(S): 400 CAPSULE ORAL at 21:50

## 2023-12-19 RX ADMIN — HEPARIN SODIUM 5000 UNIT(S): 5000 INJECTION INTRAVENOUS; SUBCUTANEOUS at 18:03

## 2023-12-19 RX ADMIN — GABAPENTIN 300 MILLIGRAM(S): 400 CAPSULE ORAL at 11:47

## 2023-12-19 RX ADMIN — Medication 10 MILLIGRAM(S): at 21:50

## 2023-12-19 RX ADMIN — CLOPIDOGREL BISULFATE 75 MILLIGRAM(S): 75 TABLET, FILM COATED ORAL at 11:47

## 2023-12-19 RX ADMIN — Medication 650 MILLIGRAM(S): at 05:59

## 2023-12-19 RX ADMIN — Medication 650 MILLIGRAM(S): at 18:03

## 2023-12-19 RX ADMIN — GABAPENTIN 100 MILLIGRAM(S): 400 CAPSULE ORAL at 05:04

## 2023-12-19 NOTE — PROGRESS NOTE ADULT - ASSESSMENT
Assessment	  72 yo F with PMHx of DM, HTN, HLD, CKD, Polyneuropathy, Depression, GERD presented from [facility] for left facial, left hemiparesis, dysarthria, aphasia and neglect on the left. LKW unknown, was found in the floor today 930AM. CTH showed no acute findings, chronic ischemic strokes. CTA Short segment occlusion of a right MCA posterior mid M2 branch. Evidence for corresponding ischemia in the right posterior frontal/parietal lobes. CTP shows a 24 cc penumbra. Pt was a code NI alert and was deemed not a candidate as MRS 3. She also tested positive for COVID. Currently admitted to CEU for stroke workup.       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  - TTE: results above   - EP will do MCOT tomorrow 12/20  - HbA1c 5.4, TSH and LDL 70  - Tele monitoring  -SBP: 120-160  - Physical therapy/Occupational therapy/Physiatry eval  - Fall and Aspiration precautions  - Tele monitoring  - Neurochecks q4 hrs  - Provide stroke education    #UTI  - UA positive  - f.u urine culture  - start Bactrim for 3 days as pt is allergic to Penicillin, 12/19 last day     #Elevated trops  - likely due to type 2 MI damand ischemia  - no need to trend trops    #DM  - insulin SS for now  - monitor FS    #HTN  - hold HTN meds for now  - permissive HTN    #Polyneuropathy  - c/w gabapentin    #Depression  - c/w cymbalta    #CKD  - monitor cr  - elevated trops, no need to trend now    #COVID pos  - monitor for now    #GERD  - c/w pantoprazole    #Misc:  - DVT Prophylaxis: heparin  - Diet: regular diet as per speech today 12/18  - GI Prophylaxis: pantoprazole  - Code status: Full code  - Dispo: 2A CEU       Pending:  EP will do MCOT tomorrow, then pt will be d/c to home facility [facility].     Assessment	  72 yo F with PMHx of DM, HTN, HLD, CKD, Polyneuropathy, Depression, GERD presented from [facility] for left facial, left hemiparesis, dysarthria, aphasia and neglect on the left. LKW unknown, was found in the floor today 930AM. CTH showed no acute findings, chronic ischemic strokes. CTA Short segment occlusion of a right MCA posterior mid M2 branch. Evidence for corresponding ischemia in the right posterior frontal/parietal lobes. CTP shows a 24 cc penumbra. Pt was a code NI alert and was deemed not a candidate as MRS 3. She also tested positive for COVID. Currently admitted to CEU for stroke workup.       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  - TTE: results above   - EP will do MCOT tomorrow 12/20  - HbA1c 5.4, TSH and LDL 70  - Tele monitoring  -SBP: 120-160  - Physical therapy/Occupational therapy/Physiatry eval  - Fall and Aspiration precautions  - Tele monitoring  - Neurochecks q4 hrs  - Provide stroke education    #UTI  - UA positive  - f.u urine culture  - start Bactrim for 3 days as pt is allergic to Penicillin, 12/19 last day     #Elevated trops  - likely due to type 2 MI damand ischemia  - no need to trend trops    #DM  - insulin SS for now  - monitor FS    #HTN  - hold HTN meds for now  - permissive HTN    #Polyneuropathy  - c/w gabapentin    #Depression  - c/w cymbalta    #CKD  - monitor cr  - elevated trops, no need to trend now    #COVID pos  - monitor for now    #GERD  - c/w pantoprazole    #Misc:  - DVT Prophylaxis: heparin  - Diet: regular diet as per speech today 12/18  - GI Prophylaxis: pantoprazole  - Code status: Full code  - Dispo: 2A CEU       Pending:  EP will do MCOT tomorrow, then pt will be d/c to home facility [facility].

## 2023-12-19 NOTE — PROGRESS NOTE ADULT - NS ATTEND AMEND GEN_ALL_CORE FT
Pt is a 72 yo F with PMHx of DM, HTN, HLD, CKD, who presented from NH with left hemiparesis and encephalopathy (likely toxic metabolic due to covid infection plus acute stroke). Covid +    Impr: right insular acute ischemic stroke, inferior branch of R M2  Etiology: ESUS  Continue DAPT/high intensity statin  Appreciate EP recs. Given Covid + status, MCOT then ILR placement as outpt  PT/OT/ST, tele, -160, dispo planning.

## 2023-12-19 NOTE — PROGRESS NOTE ADULT - SUBJECTIVE AND OBJECTIVE BOX
ANTONY QUIROZ  73y Female    CHIEF COMPLAINT:    Patient is a 73y old  Female who presents with a chief complaint of left sided weakness (18 Dec 2023 15:18)    INTERVAL HPI/OVERNIGHT EVENTS:    Patient seen and examined. No acute events overnight. Overall unchanged     ROS: All other systems are negative.    Vital Signs:    T(F): 97.9 (12-19-23 @ 12:02), Max: 98.2 (12-18-23 @ 19:47)  HR: 78 (12-19-23 @ 13:00) (75 - 93)  BP: 100/71 (12-19-23 @ 13:00) (100/54 - 137/74)  RR: 20 (12-19-23 @ 13:00) (18 - 20)  SpO2: 96% (12-19-23 @ 13:00) (96% - 98%)    18 Dec 2023 07:01  -  19 Dec 2023 07:00  --------------------------------------------------------  IN: 250 mL / OUT: 400 mL / NET: -150 mL    19 Dec 2023 07:01  -  19 Dec 2023 15:53  --------------------------------------------------------  IN: 240 mL / OUT: 0 mL / NET: 240 mL    POCT Blood Glucose.: 149 mg/dL (19 Dec 2023 11:27)  POCT Blood Glucose.: 93 mg/dL (19 Dec 2023 07:56)  POCT Blood Glucose.: 95 mg/dL (18 Dec 2023 21:49)  POCT Blood Glucose.: 134 mg/dL (18 Dec 2023 18:16)  POCT Blood Glucose.: 105 mg/dL (18 Dec 2023 16:15)    PHYSICAL EXAM:    GENERAL:  NAD  SKIN: No rashes or lesions  HEENT: Atraumatic. Normocephalic.   NECK: Supple, No JVD. No lymphadenopathy.  PULMONARY: CTA B/L. No wheezing. No rales  CVS: Normal S1, S2. Rate and Rhythm are regular.   ABDOMEN/GI: Soft, Nontender, Nondistended  MSK:  No clubbing or cyanosis   NEUROLOGIC: moves all extremities   PSYCH: Awake and confused     Consultant(s) Notes Reviewed:  [x ] YES  [ ] NO  Care Discussed with Consultants/Other Providers [ x] YES  [ ] NO    LABS:                        9.4    6.43  )-----------( 189      ( 18 Dec 2023 23:04 )             28.4     136  |  105  |  21<H>  ----------------------------<  105<H>  3.9   |  22  |  2.0<H>    Ca    8.8      18 Dec 2023 23:04    TPro  5.8<L>  /  Alb  3.4<L>  /  TBili  0.4  /  DBili  x   /  AST  17  /  ALT  9   /  AlkPhos  130<H>  12-18    RADIOLOGY & ADDITIONAL TESTS:  Imaging or report Personally Reviewed:  [x] YES  [ ] NO  EKG reviewed: [x] YES  [ ] NO    Medications:  Standing  acetaminophen     Tablet .. 650 milliGRAM(s) Oral every 12 hours  aspirin enteric coated 81 milliGRAM(s) Oral daily  atorvastatin 80 milliGRAM(s) Oral at bedtime  calcium carbonate 1250 mG  + Vitamin D (OsCal 500 + D) 1 Tablet(s) Oral two times a day  chlorhexidine 2% Cloths 1 Application(s) Topical daily  clopidogrel Tablet 75 milliGRAM(s) Oral daily  dextrose 5%. 1000 milliLiter(s) IV Continuous <Continuous>  dextrose 5%. 1000 milliLiter(s) IV Continuous <Continuous>  dextrose 50% Injectable 25 Gram(s) IV Push once  dextrose 50% Injectable 12.5 Gram(s) IV Push once  dextrose 50% Injectable 25 Gram(s) IV Push once  DULoxetine 30 milliGRAM(s) Oral daily  gabapentin 100 milliGRAM(s) Oral three times a day  gabapentin 300 milliGRAM(s) Oral daily  glucagon  Injectable 1 milliGRAM(s) IntraMuscular once  heparin   Injectable 5000 Unit(s) SubCutaneous every 12 hours  insulin lispro (ADMELOG) corrective regimen sliding scale   SubCutaneous three times a day before meals  melatonin 10 milliGRAM(s) Oral at bedtime  pantoprazole    Tablet 40 milliGRAM(s) Oral before breakfast  polyethylene glycol 3350 17 Gram(s) Oral daily  senna 2 Tablet(s) Oral at bedtime    PRN Meds  dextrose Oral Gel 15 Gram(s) Oral once PRN

## 2023-12-19 NOTE — PROGRESS NOTE ADULT - ASSESSMENT
73F PMHx of DM, HTN, HLD, CKD, Polyneuropathy, Depression, GERD presented from [facility] for left facial, left hemiparesis, dysarthria, aphasia and neglect on the left with unknown LKW CTH showed no acute findings, chronic ischemic strokes. CTA Short segment occlusion of a right MCA posterior mid M2 branch. Evidence for corresponding ischemia in the right posterior frontal/parietal lobes. CTP shows a 24 cc penumbra. Pt was a code NI alert and was deemed not a candidate as MRS 3. She also tested positive for COVID. Admitted to stroke unit for workup. MRI revealed acute R insular cortex, and R frontotemporal operculum stroke, with stable mild ventriculomegaly. currently being monitored in SDU    Acute R insular cortex ischemic stroke  - s/p CTH/CTA H/N and MRI  - on asa and plavix, atorvastatin  - PT/OT/s/s  - management per primary team  - EP following for ILR, which can be done as OP if patient remains on isolation at the time of dc     COVID 19 infection - chest xray unremarkable, no need for covid therapeutics at this time. Chest Xray reviewed, no evidence of PNA/FLuid overload. Intermittently requiring 02. trial on room air, encourage incentive spirometry    + UA  - no fevers or leukocytosis. 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 CTA Short segment occlusion of a right MCA posterior mid M2 branch. Evidence for corresponding ischemia in the right posterior frontal/parietal lobes. CTP shows a 24 cc penumbra. Pt was a code NI alert and was deemed not a candidate as MRS 3. She also tested positive for COVID. Admitted to stroke unit for workup. MRI revealed acute R insular cortex, and R frontotemporal operculum stroke, with stable mild ventriculomegaly. currently being monitored in SDU    Acute R insular cortex ischemic stroke  - s/p CTH/CTA H/N and MRI  - on asa and plavix, atorvastatin  - PT/OT/s/s  - management per primary team  - EP following for ILR, which can be done as OP if patient remains on isolation at the time of dc     COVID 19 infection - chest xray unremarkable, no need for covid therapeutics at this time. Chest Xray reviewed, no evidence of PNA/FLuid overload. Intermittently requiring 02. trial on room air, encourage incentive spirometry    + UA  - no fevers or leukocytosis. 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

## 2023-12-19 NOTE — PROGRESS NOTE ADULT - SUBJECTIVE AND OBJECTIVE BOX
Neurology Stroke Progress Note    INTERVAL HPI/OVERNIGHT EVENTS:  Patient seen and examined. Today, pts mental status is unchanged since yesterday, remains confused. OVN, pt was hypotensive and given 250 mL NS bolus which improved BP.     MEDICATIONS  (STANDING):  acetaminophen     Tablet .. 650 milliGRAM(s) Oral every 12 hours  aspirin enteric coated 81 milliGRAM(s) Oral daily  atorvastatin 80 milliGRAM(s) Oral at bedtime  calcium carbonate 1250 mG  + Vitamin D (OsCal 500 + D) 1 Tablet(s) Oral two times a day  chlorhexidine 2% Cloths 1 Application(s) Topical daily  clopidogrel Tablet 75 milliGRAM(s) Oral daily  dextrose 5%. 1000 milliLiter(s) (100 mL/Hr) IV Continuous <Continuous>  dextrose 5%. 1000 milliLiter(s) (50 mL/Hr) IV Continuous <Continuous>  dextrose 50% Injectable 12.5 Gram(s) IV Push once  dextrose 50% Injectable 25 Gram(s) IV Push once  dextrose 50% Injectable 25 Gram(s) IV Push once  DULoxetine 30 milliGRAM(s) Oral daily  gabapentin 100 milliGRAM(s) Oral three times a day  gabapentin 300 milliGRAM(s) Oral daily  glucagon  Injectable 1 milliGRAM(s) IntraMuscular once  heparin   Injectable 5000 Unit(s) SubCutaneous every 12 hours  insulin lispro (ADMELOG) corrective regimen sliding scale   SubCutaneous three times a day before meals  melatonin 10 milliGRAM(s) Oral at bedtime  pantoprazole    Tablet 40 milliGRAM(s) Oral before breakfast  polyethylene glycol 3350 17 Gram(s) Oral daily  senna 2 Tablet(s) Oral at bedtime    MEDICATIONS  (PRN):  dextrose Oral Gel 15 Gram(s) Oral once PRN Blood Glucose LESS THAN 70 milliGRAM(s)/deciliter      Allergies    penicillin (Unknown)    Intolerances        ROS: As per HPI, otherwise negative    Vital Signs Last 24 Hrs  T(C): 36.6 (19 Dec 2023 12:02), Max: 36.8 (18 Dec 2023 19:47)  T(F): 97.9 (19 Dec 2023 12:02), Max: 98.2 (18 Dec 2023 19:47)  HR: 78 (19 Dec 2023 13:00) (75 - 93)  BP: 100/71 (19 Dec 2023 13:00) (100/54 - 137/74)  BP(mean): 82 (19 Dec 2023 13:00) (73 - 83)  RR: 20 (19 Dec 2023 13:00) (18 - 20)  SpO2: 96% (19 Dec 2023 13:00) (96% - 98%)    Parameters below as of 19 Dec 2023 13:00  Patient On (Oxygen Delivery Method): nasal cannula  O2 Flow (L/min): 2      Physical exam:  General: awake and alert, sitting comfortably, no acute distress    Neurologic:  Mental status: awake, alert, oriented to place. Pt unable to correctly answer questions in regards to age and time. Speech is fluent, able to name objects. Follows commands. Attention/concentration intact. No dysarthria, no aphasia.  Cranial nerves:   II: visual fields are full to confrontation. pupils equally round and reactive to light,   III, IV, VI: EOMI without nystagmus  V:  V1-V3 sensation intact,   VII: minor left facial droop with normal eye closure and smile  IX, X: Uvula is midline and soft palate rises symmetrically  XI: head turning and shoulder shrug are intact.  XII: tongue midline  Motor: Normal bulk and tone, strength 5/5 in b/l UE and LE,  strength 5/5. No pronator drift  Sensation: intact to light touch. No neglect.  Coordination: No dysmetria on finger-to-nose and heel-to-shin  Reflexes: downgoing toes bilaterally  Gait: not assessed.     NIH SS: 3     LABS:                        9.4    6.43  )-----------( 189      ( 18 Dec 2023 23:04 )             28.4     12-18    136  |  105  |  21<H>  ----------------------------<  105<H>  3.9   |  22  |  2.0<H>    Ca    8.8      18 Dec 2023 23:04    TPro  5.8<L>  /  Alb  3.4<L>  /  TBili  0.4  /  DBili  x   /  AST  17  /  ALT  9   /  AlkPhos  130<H>  12-18      Urinalysis Basic - ( 18 Dec 2023 23:04 )    Color: x / Appearance: x / SG: x / pH: x  Gluc: 105 mg/dL / Ketone: x  / Bili: x / Urobili: x   Blood: x / Protein: x / Nitrite: x   Leuk Esterase: x / RBC: x / WBC x   Sq Epi: x / Non Sq Epi: x / Bacteria: x        RADIOLOGY & ADDITIONAL TESTS:    < from: MR Head No Cont (12.15.23 @ 17:00) >  IMPRESSION:    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 No hemorrhagic transformation.    < end of copied text >    < from: TTE Echo Complete w/o Contrast w/ Doppler (12.18.23 @ 10:49) >  ummary:   1. Left ventricular ejection fraction, by visual estimation, is 60 to   65%.   2. Normal left ventricular size and wall thicknesses, with normal   systolic and diastolic function.    PHYSICIAN INTERPRETATION:  Left Ventricle: Normal left ventricular size and wallthicknesses, with   normal systolic and diastolic function. Left ventricular ejection   fraction, by visual estimation, is 60 to 65%.  Right Ventricle: Normal right ventricular size and function.  Left Atrium: Normal left atrial size.  Right Atrium: Normal right atrial size.  Pericardium: There is no evidence of pericardial effusion.  Mitral Valve: Grossly normal mitral valve with normal leaflet excursion.   No evidence of mitral stenosis. Trivial mitral regurgitation.  Tricuspid Valve: The tricuspid valve is not well visualized. No tricuspid   regurgitation visualized.  Aortic Valve: Trileaflet aortic valve with normal opening. No evidence of   aortic stenosis. No aortic regurgitation.  Pulmonic Valve: No pulmonic regurgitation.  Aorta: The aortic root and ascending aorta are normal in size.    < from: CT Angio Neck Stroke Protocol w/ IV Cont (12.15.23 @ 11:48) >  IMPRESSION:    Short segment occlusion of a right MCA posterior mid M2 branch. Evidence   for corresponding ischemia in the right posterior frontal/parietal lobes:   Tmax >6s vol 29 cc, CBF <30% vol 5 cc; mismatch vol / penumbra 24 cc.    < end of copied text >

## 2023-12-20 ENCOUNTER — TRANSCRIPTION ENCOUNTER (OUTPATIENT)
Age: 73
End: 2023-12-20

## 2023-12-20 LAB
ALBUMIN SERPL ELPH-MCNC: 3.4 G/DL — LOW (ref 3.5–5.2)
ALBUMIN SERPL ELPH-MCNC: 3.4 G/DL — LOW (ref 3.5–5.2)
ALP SERPL-CCNC: 119 U/L — HIGH (ref 30–115)
ALP SERPL-CCNC: 119 U/L — HIGH (ref 30–115)
ALT FLD-CCNC: 11 U/L — SIGNIFICANT CHANGE UP (ref 0–41)
ALT FLD-CCNC: 11 U/L — SIGNIFICANT CHANGE UP (ref 0–41)
ANION GAP SERPL CALC-SCNC: 10 MMOL/L — SIGNIFICANT CHANGE UP (ref 7–14)
ANION GAP SERPL CALC-SCNC: 10 MMOL/L — SIGNIFICANT CHANGE UP (ref 7–14)
AST SERPL-CCNC: 21 U/L — SIGNIFICANT CHANGE UP (ref 0–41)
AST SERPL-CCNC: 21 U/L — SIGNIFICANT CHANGE UP (ref 0–41)
BASOPHILS # BLD AUTO: 0.06 K/UL — SIGNIFICANT CHANGE UP (ref 0–0.2)
BASOPHILS # BLD AUTO: 0.06 K/UL — SIGNIFICANT CHANGE UP (ref 0–0.2)
BASOPHILS NFR BLD AUTO: 1 % — SIGNIFICANT CHANGE UP (ref 0–1)
BASOPHILS NFR BLD AUTO: 1 % — SIGNIFICANT CHANGE UP (ref 0–1)
BILIRUB SERPL-MCNC: 0.3 MG/DL — SIGNIFICANT CHANGE UP (ref 0.2–1.2)
BILIRUB SERPL-MCNC: 0.3 MG/DL — SIGNIFICANT CHANGE UP (ref 0.2–1.2)
BUN SERPL-MCNC: 16 MG/DL — SIGNIFICANT CHANGE UP (ref 10–20)
BUN SERPL-MCNC: 16 MG/DL — SIGNIFICANT CHANGE UP (ref 10–20)
CALCIUM SERPL-MCNC: 9 MG/DL — SIGNIFICANT CHANGE UP (ref 8.4–10.4)
CALCIUM SERPL-MCNC: 9 MG/DL — SIGNIFICANT CHANGE UP (ref 8.4–10.4)
CHLORIDE SERPL-SCNC: 107 MMOL/L — SIGNIFICANT CHANGE UP (ref 98–110)
CHLORIDE SERPL-SCNC: 107 MMOL/L — SIGNIFICANT CHANGE UP (ref 98–110)
CO2 SERPL-SCNC: 23 MMOL/L — SIGNIFICANT CHANGE UP (ref 17–32)
CO2 SERPL-SCNC: 23 MMOL/L — SIGNIFICANT CHANGE UP (ref 17–32)
CREAT SERPL-MCNC: 1.8 MG/DL — HIGH (ref 0.7–1.5)
CREAT SERPL-MCNC: 1.8 MG/DL — HIGH (ref 0.7–1.5)
EGFR: 29 ML/MIN/1.73M2 — LOW
EGFR: 29 ML/MIN/1.73M2 — LOW
EOSINOPHIL # BLD AUTO: 0.41 K/UL — SIGNIFICANT CHANGE UP (ref 0–0.7)
EOSINOPHIL # BLD AUTO: 0.41 K/UL — SIGNIFICANT CHANGE UP (ref 0–0.7)
EOSINOPHIL NFR BLD AUTO: 6.6 % — SIGNIFICANT CHANGE UP (ref 0–8)
EOSINOPHIL NFR BLD AUTO: 6.6 % — SIGNIFICANT CHANGE UP (ref 0–8)
GLUCOSE SERPL-MCNC: 91 MG/DL — SIGNIFICANT CHANGE UP (ref 70–99)
GLUCOSE SERPL-MCNC: 91 MG/DL — SIGNIFICANT CHANGE UP (ref 70–99)
HCT VFR BLD CALC: 28.3 % — LOW (ref 37–47)
HCT VFR BLD CALC: 28.3 % — LOW (ref 37–47)
HGB BLD-MCNC: 9.5 G/DL — LOW (ref 12–16)
HGB BLD-MCNC: 9.5 G/DL — LOW (ref 12–16)
IMM GRANULOCYTES NFR BLD AUTO: 0.2 % — SIGNIFICANT CHANGE UP (ref 0.1–0.3)
IMM GRANULOCYTES NFR BLD AUTO: 0.2 % — SIGNIFICANT CHANGE UP (ref 0.1–0.3)
LYMPHOCYTES # BLD AUTO: 2.26 K/UL — SIGNIFICANT CHANGE UP (ref 1.2–3.4)
LYMPHOCYTES # BLD AUTO: 2.26 K/UL — SIGNIFICANT CHANGE UP (ref 1.2–3.4)
LYMPHOCYTES # BLD AUTO: 36.2 % — SIGNIFICANT CHANGE UP (ref 20.5–51.1)
LYMPHOCYTES # BLD AUTO: 36.2 % — SIGNIFICANT CHANGE UP (ref 20.5–51.1)
MAGNESIUM SERPL-MCNC: 1.7 MG/DL — LOW (ref 1.8–2.4)
MAGNESIUM SERPL-MCNC: 1.7 MG/DL — LOW (ref 1.8–2.4)
MCHC RBC-ENTMCNC: 32.1 PG — HIGH (ref 27–31)
MCHC RBC-ENTMCNC: 32.1 PG — HIGH (ref 27–31)
MCHC RBC-ENTMCNC: 33.6 G/DL — SIGNIFICANT CHANGE UP (ref 32–37)
MCHC RBC-ENTMCNC: 33.6 G/DL — SIGNIFICANT CHANGE UP (ref 32–37)
MCV RBC AUTO: 95.6 FL — SIGNIFICANT CHANGE UP (ref 81–99)
MCV RBC AUTO: 95.6 FL — SIGNIFICANT CHANGE UP (ref 81–99)
MONOCYTES # BLD AUTO: 0.57 K/UL — SIGNIFICANT CHANGE UP (ref 0.1–0.6)
MONOCYTES # BLD AUTO: 0.57 K/UL — SIGNIFICANT CHANGE UP (ref 0.1–0.6)
MONOCYTES NFR BLD AUTO: 9.1 % — SIGNIFICANT CHANGE UP (ref 1.7–9.3)
MONOCYTES NFR BLD AUTO: 9.1 % — SIGNIFICANT CHANGE UP (ref 1.7–9.3)
NEUTROPHILS # BLD AUTO: 2.93 K/UL — SIGNIFICANT CHANGE UP (ref 1.4–6.5)
NEUTROPHILS # BLD AUTO: 2.93 K/UL — SIGNIFICANT CHANGE UP (ref 1.4–6.5)
NEUTROPHILS NFR BLD AUTO: 46.9 % — SIGNIFICANT CHANGE UP (ref 42.2–75.2)
NEUTROPHILS NFR BLD AUTO: 46.9 % — SIGNIFICANT CHANGE UP (ref 42.2–75.2)
NRBC # BLD: 0 /100 WBCS — SIGNIFICANT CHANGE UP (ref 0–0)
NRBC # BLD: 0 /100 WBCS — SIGNIFICANT CHANGE UP (ref 0–0)
PLATELET # BLD AUTO: 200 K/UL — SIGNIFICANT CHANGE UP (ref 130–400)
PLATELET # BLD AUTO: 200 K/UL — SIGNIFICANT CHANGE UP (ref 130–400)
PMV BLD: 9.6 FL — SIGNIFICANT CHANGE UP (ref 7.4–10.4)
PMV BLD: 9.6 FL — SIGNIFICANT CHANGE UP (ref 7.4–10.4)
POTASSIUM SERPL-MCNC: 4.1 MMOL/L — SIGNIFICANT CHANGE UP (ref 3.5–5)
POTASSIUM SERPL-MCNC: 4.1 MMOL/L — SIGNIFICANT CHANGE UP (ref 3.5–5)
POTASSIUM SERPL-SCNC: 4.1 MMOL/L — SIGNIFICANT CHANGE UP (ref 3.5–5)
POTASSIUM SERPL-SCNC: 4.1 MMOL/L — SIGNIFICANT CHANGE UP (ref 3.5–5)
PROT SERPL-MCNC: 5.6 G/DL — LOW (ref 6–8)
PROT SERPL-MCNC: 5.6 G/DL — LOW (ref 6–8)
RBC # BLD: 2.96 M/UL — LOW (ref 4.2–5.4)
RBC # BLD: 2.96 M/UL — LOW (ref 4.2–5.4)
RBC # FLD: 12.5 % — SIGNIFICANT CHANGE UP (ref 11.5–14.5)
RBC # FLD: 12.5 % — SIGNIFICANT CHANGE UP (ref 11.5–14.5)
SODIUM SERPL-SCNC: 140 MMOL/L — SIGNIFICANT CHANGE UP (ref 135–146)
SODIUM SERPL-SCNC: 140 MMOL/L — SIGNIFICANT CHANGE UP (ref 135–146)
WBC # BLD: 6.24 K/UL — SIGNIFICANT CHANGE UP (ref 4.8–10.8)
WBC # BLD: 6.24 K/UL — SIGNIFICANT CHANGE UP (ref 4.8–10.8)
WBC # FLD AUTO: 6.24 K/UL — SIGNIFICANT CHANGE UP (ref 4.8–10.8)
WBC # FLD AUTO: 6.24 K/UL — SIGNIFICANT CHANGE UP (ref 4.8–10.8)

## 2023-12-20 PROCEDURE — 99232 SBSQ HOSP IP/OBS MODERATE 35: CPT

## 2023-12-20 RX ORDER — MAGNESIUM SULFATE 500 MG/ML
2 VIAL (ML) INJECTION ONCE
Refills: 0 | Status: COMPLETED | OUTPATIENT
Start: 2023-12-20 | End: 2023-12-20

## 2023-12-20 RX ADMIN — Medication 650 MILLIGRAM(S): at 05:01

## 2023-12-20 RX ADMIN — HEPARIN SODIUM 5000 UNIT(S): 5000 INJECTION INTRAVENOUS; SUBCUTANEOUS at 17:24

## 2023-12-20 RX ADMIN — Medication 1 TABLET(S): at 05:01

## 2023-12-20 RX ADMIN — HEPARIN SODIUM 5000 UNIT(S): 5000 INJECTION INTRAVENOUS; SUBCUTANEOUS at 05:00

## 2023-12-20 RX ADMIN — GABAPENTIN 300 MILLIGRAM(S): 400 CAPSULE ORAL at 11:57

## 2023-12-20 RX ADMIN — Medication 0: at 11:57

## 2023-12-20 RX ADMIN — Medication 25 GRAM(S): at 08:02

## 2023-12-20 RX ADMIN — POLYETHYLENE GLYCOL 3350 17 GRAM(S): 17 POWDER, FOR SOLUTION ORAL at 12:00

## 2023-12-20 RX ADMIN — SENNA PLUS 2 TABLET(S): 8.6 TABLET ORAL at 21:30

## 2023-12-20 RX ADMIN — GABAPENTIN 100 MILLIGRAM(S): 400 CAPSULE ORAL at 13:46

## 2023-12-20 RX ADMIN — ATORVASTATIN CALCIUM 80 MILLIGRAM(S): 80 TABLET, FILM COATED ORAL at 21:30

## 2023-12-20 RX ADMIN — CLOPIDOGREL BISULFATE 75 MILLIGRAM(S): 75 TABLET, FILM COATED ORAL at 11:59

## 2023-12-20 RX ADMIN — Medication 81 MILLIGRAM(S): at 11:58

## 2023-12-20 RX ADMIN — DULOXETINE HYDROCHLORIDE 30 MILLIGRAM(S): 30 CAPSULE, DELAYED RELEASE ORAL at 11:59

## 2023-12-20 RX ADMIN — GABAPENTIN 100 MILLIGRAM(S): 400 CAPSULE ORAL at 05:00

## 2023-12-20 RX ADMIN — Medication 10 MILLIGRAM(S): at 21:30

## 2023-12-20 RX ADMIN — CHLORHEXIDINE GLUCONATE 1 APPLICATION(S): 213 SOLUTION TOPICAL at 12:00

## 2023-12-20 RX ADMIN — Medication 650 MILLIGRAM(S): at 17:23

## 2023-12-20 RX ADMIN — Medication 650 MILLIGRAM(S): at 18:15

## 2023-12-20 RX ADMIN — Medication 650 MILLIGRAM(S): at 05:08

## 2023-12-20 RX ADMIN — GABAPENTIN 100 MILLIGRAM(S): 400 CAPSULE ORAL at 21:34

## 2023-12-20 RX ADMIN — Medication 1 TABLET(S): at 17:22

## 2023-12-20 RX ADMIN — PANTOPRAZOLE SODIUM 40 MILLIGRAM(S): 20 TABLET, DELAYED RELEASE ORAL at 05:01

## 2023-12-20 NOTE — DISCHARGE NOTE PROVIDER - NSDCHHHOMEBOUND_GEN_ALL_CORE
Chest pain/weakness during/after ambulation   greater than 20 feet Stroke/TBI (neurological/cognitive impairment)

## 2023-12-20 NOTE — PROGRESS NOTE ADULT - ASSESSMENT
73F PMHx of DM, HTN, HLD, CKD, Polyneuropathy, Depression, GERD presented from [facility] for left facial, left hemiparesis, dysarthria, aphasia and neglect on the left with unknown LKW CTH showed no acute findings, chronic ischemic strokes. CTA Short segment occlusion of a right MCA posterior mid M2 branch. Evidence for corresponding ischemia in the right posterior frontal/parietal lobes. CTP shows a 24 cc penumbra. Pt was a code NI alert and was deemed not a candidate as MRS 3. She also tested positive for COVID. Admitted to stroke unit for workup. MRI revealed acute R insular cortex, and R frontotemporal operculum stroke, with stable mild ventriculomegaly. currently being monitored in SDU    # Acute R insular cortex ischemic CVA  - s/p CTH/CTA H/N and MRI  - on asa and plavix, atorvastatin  - PT/OT/s/s  - management per primary team  - EP following for ILR, which can be done as OP if patient remains on isolation at the time of dc   - supportive care  - discharge home planned, anticipate discharge in 24 hours   - pt is awaiting for DME delivery     # COVID 19 infection   - chest xray unremarkable, no need for covid therapeutics at this time.   - encourage incentive spirometry  - pt is asymptomatic now   - comfortable on RA     # suspected UTI  - no fevers or leukocytosis  - no urine Cx is available  - pt received po Bactrim for 3 days   - likely asymptomatic bacteriuria, no indications for Abx     # Elevated trops  - trop 32-->40, per cardiology likely demand ischemia  - echo noted     # HTN   - off antihypertensives, -120s    # CKD 3/4   - , avoid nephrotoxic agents   - improving in last 24 hours   - maintain sufficient hydration     # GI/DVT prophylaxis    # dispo: anticipate discharge home in 24 hours     Case d/w neurology team      Please call if any questions [phone]   73F PMHx of DM, HTN, HLD, CKD, Polyneuropathy, Depression, GERD presented from [facility] for left facial, left hemiparesis, dysarthria, aphasia and neglect on the left with unknown LKW CTH showed no acute findings, chronic ischemic strokes. CTA Short segment occlusion of a right MCA posterior mid M2 branch. Evidence for corresponding ischemia in the right posterior frontal/parietal lobes. CTP shows a 24 cc penumbra. Pt was a code NI alert and was deemed not a candidate as MRS 3. She also tested positive for COVID. Admitted to stroke unit for workup. MRI revealed acute R insular cortex, and R frontotemporal operculum stroke, with stable mild ventriculomegaly. currently being monitored in SDU    # Acute R insular cortex ischemic CVA  - s/p CTH/CTA H/N and MRI  - on asa and plavix, atorvastatin  - PT/OT/s/s  - management per primary team  - EP following for ILR, which can be done as OP if patient remains on isolation at the time of dc   - supportive care  - discharge home planned, anticipate discharge in 24 hours   - pt is awaiting for DME delivery     # COVID 19 infection   - chest xray unremarkable, no need for covid therapeutics at this time.   - encourage incentive spirometry  - pt is asymptomatic now   - comfortable on RA     # suspected UTI  - no fevers or leukocytosis  - no urine Cx is available  - pt received po Bactrim for 3 days   - likely asymptomatic bacteriuria, no indications for Abx     # Elevated trops  - trop 32-->40, per cardiology likely demand ischemia  - echo noted     # HTN   - off antihypertensives, -120s    # CKD 3/4   - , avoid nephrotoxic agents   - improving in last 24 hours   - maintain sufficient hydration     # GI/DVT prophylaxis    # dispo: anticipate discharge home in 24 hours     Case d/w neurology team      Please call if any questions [phone]

## 2023-12-20 NOTE — PROGRESS NOTE ADULT - SUBJECTIVE AND OBJECTIVE BOX
72 yo F with PMHx of DM, HTN, HLD, CKD, Polyneuropathy, Depression, GERD presented from [facility] for left facial, left hemiparesis, dysarthria, aphasia and neglect on the left. CTH showed no acute findings, chronic ischemic strokes. CTA Short segment occlusion of a right MCA posterior mid M2 branch. Evidence for corresponding ischemia in the right posterior frontal/parietal lobes. CTP shows a 24 cc penumbra. Pt was a code NI alert and was deemed not a candidate as MRS 3. She also tested positive for COVID. Admitted to CEU currently, likely discharged tomorrow pending delivery of home supplies as well as MCOT device to home.     INTERVAL HPI/OVERNIGHT EVENTS:    Patient seen and examined. No acute events overnight. Overall unchanged         Vital Signs:  T(C): 36.3 (20 Dec 2023 11:39), Max: 36.4 (19 Dec 2023 16:10)  T(F): 97.4 (20 Dec 2023 11:39), Max: 97.6 (19 Dec 2023 21:00)  HR: 73 (20 Dec 2023 11:39) (71 - 85)  BP: 158/73 (20 Dec 2023 11:39) (114/74 - 158/73)  BP(mean): 105 (20 Dec 2023 11:39) (83 - 105)  RR: 20 (20 Dec 2023 11:39) (20 - 20)  SpO2: 92% (20 Dec 2023 11:39) (92% - 96%)    Parameters below as of 20 Dec 2023 11:39  Patient On (Oxygen Delivery Method): nasal cannula        PHYSICAL EXAM:    GENERAL:  NAD  SKIN: No rashes or lesions  HEENT: Atraumatic. Normocephalic.   NECK: Supple, No JVD. No lymphadenopathy.  PULMONARY: CTA B/L. No wheezing. No rales  CVS: Normal S1, S2. Rate and Rhythm are regular.   ABDOMEN/GI: Soft, Nontender, Nondistended  MSK:  No clubbing or cyanosis   NEUROLOGIC: moves all extremities Neurologic:  Mental status: awake, alert, oriented to place. Pt unable to correctly answer questions in regards to age and time. Speech is fluent, able to name objects. Follows commands. Attention/concentration intact. No dysarthria, no aphasia.  Cranial nerves:   II: visual fields are full to confrontation. pupils equally round and reactive to light,   III, IV, VI: EOMI without nystagmus  V:  V1-V3 sensation intact,   VII: no droop observed today, normal eye closure and smile  IX, X: Uvula is midline and soft palate rises symmetrically  XI: head turning and shoulder shrug are intact.  XII: tongue midline  Motor: Normal bulk and tone, strength 5/5 in b/l UE and LE,  strength 5/5. No pronator drift  Sensation: intact to light touch. No neglect.  Coordination: No dysmetria on finger-to-nose and heel-to-shin  Reflexes: downgoing toes bilaterally  Gait: not assessed.     NIH SS: 2      Consultant(s) Notes Reviewed:  [x ] YES  [ ] NO  Care Discussed with Consultants/Other Providers [ x] YES  [ ] NO    LABS:                                   9.5    6.24  )-----------( 200      ( 20 Dec 2023 06:16 )             28.3   12-20    140  |  107  |  16  ----------------------------<  91  4.1   |  23  |  1.8<H>    Ca    9.0      20 Dec 2023 06:16  Mg     1.7     12-20    TPro  5.6<L>  /  Alb  3.4<L>  /  TBili  0.3  /  DBili  x   /  AST  21  /  ALT  11  /  AlkPhos  119<H>  12-20    RADIOLOGY & ADDITIONAL TESTS:    < from: MR Head No Cont (12.15.23 @ 17:00) >    IMPRESSION:    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 No hemorrhagic transformation.    Stable mild ventriculomegaly.    < end of copied text >  < from: CT Angio Neck Stroke Protocol w/ IV Cont (12.15.23 @ 11:48) >    IMPRESSION:    Short segment occlusion of a right MCA posterior mid M2 branch. Evidence   for corresponding ischemia in the right posterior frontal/parietal lobes:   Tmax >6s vol 29 cc, CBF <30% vol 5 cc; mismatch vol / penumbra 24 cc.    Spoke with Joyce Ortega on 12/15/2023 11:19 AM with readback.    < end of copied text >  < from: TTE Echo Complete w/o Contrast w/ Doppler (12.18.23 @ 10:49) >  Summary:   1. Left ventricular ejection fraction, by visual estimation, is 60 to   65%.   2. Normal left ventricular size and wall thicknesses, with normal   systolic and diastolic function.    < end of copied text >      Imaging or report Personally Reviewed:  [x] YES  [ ] NO  EKG reviewed: [x] YES  [ ] NO      MEDICATIONS  (STANDING):  acetaminophen     Tablet .. 650 milliGRAM(s) Oral every 12 hours  aspirin enteric coated 81 milliGRAM(s) Oral daily  atorvastatin 80 milliGRAM(s) Oral at bedtime  calcium carbonate 1250 mG  + Vitamin D (OsCal 500 + D) 1 Tablet(s) Oral two times a day  chlorhexidine 2% Cloths 1 Application(s) Topical daily  clopidogrel Tablet 75 milliGRAM(s) Oral daily  dextrose 5%. 1000 milliLiter(s) (50 mL/Hr) IV Continuous <Continuous>  dextrose 5%. 1000 milliLiter(s) (100 mL/Hr) IV Continuous <Continuous>  dextrose 50% Injectable 25 Gram(s) IV Push once  dextrose 50% Injectable 25 Gram(s) IV Push once  dextrose 50% Injectable 12.5 Gram(s) IV Push once  DULoxetine 30 milliGRAM(s) Oral daily  gabapentin 100 milliGRAM(s) Oral three times a day  gabapentin 300 milliGRAM(s) Oral daily  glucagon  Injectable 1 milliGRAM(s) IntraMuscular once  heparin   Injectable 5000 Unit(s) SubCutaneous every 12 hours  insulin lispro (ADMELOG) corrective regimen sliding scale   SubCutaneous three times a day before meals  melatonin 10 milliGRAM(s) Oral at bedtime  pantoprazole    Tablet 40 milliGRAM(s) Oral before breakfast  polyethylene glycol 3350 17 Gram(s) Oral daily  senna 2 Tablet(s) Oral at bedtime    MEDICATIONS  (PRN):  dextrose Oral Gel 15 Gram(s) Oral once PRN Blood Glucose LESS THAN 70 milliGRAM(s)/deciliter               74 yo F with PMHx of DM, HTN, HLD, CKD, Polyneuropathy, Depression, GERD presented from [facility] for left facial, left hemiparesis, dysarthria, aphasia and neglect on the left. CTH showed no acute findings, chronic ischemic strokes. CTA Short segment occlusion of a right MCA posterior mid M2 branch. Evidence for corresponding ischemia in the right posterior frontal/parietal lobes. CTP shows a 24 cc penumbra. Pt was a code NI alert and was deemed not a candidate as MRS 3. She also tested positive for COVID. Admitted to CEU currently, likely discharged tomorrow pending delivery of home supplies as well as MCOT device to home.     INTERVAL HPI/OVERNIGHT EVENTS:    Patient seen and examined. No acute events overnight. Overall unchanged         Vital Signs:  T(C): 36.3 (20 Dec 2023 11:39), Max: 36.4 (19 Dec 2023 16:10)  T(F): 97.4 (20 Dec 2023 11:39), Max: 97.6 (19 Dec 2023 21:00)  HR: 73 (20 Dec 2023 11:39) (71 - 85)  BP: 158/73 (20 Dec 2023 11:39) (114/74 - 158/73)  BP(mean): 105 (20 Dec 2023 11:39) (83 - 105)  RR: 20 (20 Dec 2023 11:39) (20 - 20)  SpO2: 92% (20 Dec 2023 11:39) (92% - 96%)    Parameters below as of 20 Dec 2023 11:39  Patient On (Oxygen Delivery Method): nasal cannula        PHYSICAL EXAM:    GENERAL:  NAD  SKIN: No rashes or lesions  HEENT: Atraumatic. Normocephalic.   NECK: Supple, No JVD. No lymphadenopathy.  PULMONARY: CTA B/L. No wheezing. No rales  CVS: Normal S1, S2. Rate and Rhythm are regular.   ABDOMEN/GI: Soft, Nontender, Nondistended  MSK:  No clubbing or cyanosis   NEUROLOGIC: moves all extremities Neurologic:  Mental status: awake, alert, oriented to place. Pt unable to correctly answer questions in regards to age and time. Speech is fluent, able to name objects. Follows commands. Attention/concentration intact. No dysarthria, no aphasia.  Cranial nerves:   II: visual fields are full to confrontation. pupils equally round and reactive to light,   III, IV, VI: EOMI without nystagmus  V:  V1-V3 sensation intact,   VII: no droop observed today, normal eye closure and smile  IX, X: Uvula is midline and soft palate rises symmetrically  XI: head turning and shoulder shrug are intact.  XII: tongue midline  Motor: Normal bulk and tone, strength 5/5 in b/l UE and LE,  strength 5/5. No pronator drift  Sensation: intact to light touch. No neglect.  Coordination: No dysmetria on finger-to-nose and heel-to-shin  Reflexes: downgoing toes bilaterally  Gait: not assessed.     NIH SS: 2      Consultant(s) Notes Reviewed:  [x ] YES  [ ] NO  Care Discussed with Consultants/Other Providers [ x] YES  [ ] NO    LABS:                                   9.5    6.24  )-----------( 200      ( 20 Dec 2023 06:16 )             28.3   12-20    140  |  107  |  16  ----------------------------<  91  4.1   |  23  |  1.8<H>    Ca    9.0      20 Dec 2023 06:16  Mg     1.7     12-20    TPro  5.6<L>  /  Alb  3.4<L>  /  TBili  0.3  /  DBili  x   /  AST  21  /  ALT  11  /  AlkPhos  119<H>  12-20    RADIOLOGY & ADDITIONAL TESTS:    < from: MR Head No Cont (12.15.23 @ 17:00) >    IMPRESSION:    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 No hemorrhagic transformation.    Stable mild ventriculomegaly.    < end of copied text >  < from: CT Angio Neck Stroke Protocol w/ IV Cont (12.15.23 @ 11:48) >    IMPRESSION:    Short segment occlusion of a right MCA posterior mid M2 branch. Evidence   for corresponding ischemia in the right posterior frontal/parietal lobes:   Tmax >6s vol 29 cc, CBF <30% vol 5 cc; mismatch vol / penumbra 24 cc.    Spoke with Joyce Ortega on 12/15/2023 11:19 AM with readback.    < end of copied text >  < from: TTE Echo Complete w/o Contrast w/ Doppler (12.18.23 @ 10:49) >  Summary:   1. Left ventricular ejection fraction, by visual estimation, is 60 to   65%.   2. Normal left ventricular size and wall thicknesses, with normal   systolic and diastolic function.    < end of copied text >      Imaging or report Personally Reviewed:  [x] YES  [ ] NO  EKG reviewed: [x] YES  [ ] NO      MEDICATIONS  (STANDING):  acetaminophen     Tablet .. 650 milliGRAM(s) Oral every 12 hours  aspirin enteric coated 81 milliGRAM(s) Oral daily  atorvastatin 80 milliGRAM(s) Oral at bedtime  calcium carbonate 1250 mG  + Vitamin D (OsCal 500 + D) 1 Tablet(s) Oral two times a day  chlorhexidine 2% Cloths 1 Application(s) Topical daily  clopidogrel Tablet 75 milliGRAM(s) Oral daily  dextrose 5%. 1000 milliLiter(s) (50 mL/Hr) IV Continuous <Continuous>  dextrose 5%. 1000 milliLiter(s) (100 mL/Hr) IV Continuous <Continuous>  dextrose 50% Injectable 25 Gram(s) IV Push once  dextrose 50% Injectable 25 Gram(s) IV Push once  dextrose 50% Injectable 12.5 Gram(s) IV Push once  DULoxetine 30 milliGRAM(s) Oral daily  gabapentin 100 milliGRAM(s) Oral three times a day  gabapentin 300 milliGRAM(s) Oral daily  glucagon  Injectable 1 milliGRAM(s) IntraMuscular once  heparin   Injectable 5000 Unit(s) SubCutaneous every 12 hours  insulin lispro (ADMELOG) corrective regimen sliding scale   SubCutaneous three times a day before meals  melatonin 10 milliGRAM(s) Oral at bedtime  pantoprazole    Tablet 40 milliGRAM(s) Oral before breakfast  polyethylene glycol 3350 17 Gram(s) Oral daily  senna 2 Tablet(s) Oral at bedtime    MEDICATIONS  (PRN):  dextrose Oral Gel 15 Gram(s) Oral once PRN Blood Glucose LESS THAN 70 milliGRAM(s)/deciliter

## 2023-12-20 NOTE — DISCHARGE NOTE PROVIDER - HOSPITAL COURSE
74 yo F with PMHx of DM, HTN, HLD, CKD, Polyneuropathy, Depression, GERD presented to ED from [facility] for left facial, left hemiparesis, dysarthria, aphasia and neglect on the left. CTH showed no acute findings, chronic ischemic strokes. CTA Short segment occlusion of a right MCA posterior mid M2 branch. Evidence for corresponding ischemia in the right posterior frontal/parietal lobes. CTP shows a 24 cc penumbra. Pt was a code NI alert and was deemed not a candidate as MRS 3. MRI head Non contrast showed acute cortical infarcts in the right insular cortex and right frontotemporal operculum. No hemorrhagic transformation. TTE results below. HbA1c   Upon admission, she tested positive for COVID and was placed in isolation. During hospital stay, pt was found ot have a UTI and treated with Bactrim x 3 days, completed. Pt was started on DAPT therapy and continuing with home medication, atorvastatin 80mg. Pt also received an MCOT today from Electrophysiology team. Patient was evaluated by PT/OT who deemed patient a suitable candidate for a rehab facility. Pt is medically stable for discharge ____ to f/u with vascular neurology within the next 2-3 weeks for further management.     11< from: TTE Echo Complete w/o Contrast w/ Doppler (12.18.23 @ 10:49) >    Summary:   1. Left ventricular ejection fraction, by visual estimation, is 60 to   65%.   2. Normal left ventricular size and wall thicknesses, with normal   systolic and diastolic function.    PHYSICIAN INTERPRETATION:  Left Ventricle: Normal left ventricular size and wallthicknesses, with   normal systolic and diastolic function. Left ventricular ejection   fraction, by visual estimation, is 60 to 65%.  Right Ventricle: Normal right ventricular size and function.  Left Atrium: Normal left atrial size.  Right Atrium: Normal right atrial size.  Pericardium: There is no evidence of pericardial effusion.  Mitral Valve: Grossly normal mitral valve with normal leaflet excursion.   No evidence of mitral stenosis. Trivial mitral regurgitation.  Tricuspid Valve: The tricuspid valve is not well visualized. No tricuspid   regurgitation visualized.  Aortic Valve: Trileaflet aortic valve with normal opening. No evidence of   aortic stenosis. No aortic regurgitation.  Pulmonic Valve: No pulmonic regurgitation.  Aorta: The aortic root and ascending aorta are normal in size.    < end of copied text >     74 yo F with PMHx of DM, HTN, HLD, CKD, Polyneuropathy, Depression, GERD presented to ED from [facility] for left facial, left hemiparesis, dysarthria, aphasia and neglect on the left. CTH showed no acute findings, chronic ischemic strokes. CTA Short segment occlusion of a right MCA posterior mid M2 branch. Evidence for corresponding ischemia in the right posterior frontal/parietal lobes. CTP shows a 24 cc penumbra. Pt was a code NI alert and was deemed not a candidate as MRS 3. MRI head Non contrast showed acute cortical infarcts in the right insular cortex and right frontotemporal operculum. No hemorrhagic transformation. TTE results below. HbA1c   Upon admission, she tested positive for COVID and was placed in isolation. During hospital stay, pt was found ot have a UTI and treated with Bactrim x 3 days, completed. Pt was started on DAPT therapy and continuing with home medication, atorvastatin 80mg. Pt also received an MCOT today from Electrophysiology team. Patient was evaluated by PT/OT who deemed patient a suitable candidate for a rehab facility. Pt is medically stable for discharge ____ to f/u with vascular neurology within the next 2-3 weeks for further management.     11< from: TTE Echo Complete w/o Contrast w/ Doppler (12.18.23 @ 10:49) >    Summary:   1. Left ventricular ejection fraction, by visual estimation, is 60 to   65%.   2. Normal left ventricular size and wall thicknesses, with normal   systolic and diastolic function.    PHYSICIAN INTERPRETATION:  Left Ventricle: Normal left ventricular size and wallthicknesses, with   normal systolic and diastolic function. Left ventricular ejection   fraction, by visual estimation, is 60 to 65%.  Right Ventricle: Normal right ventricular size and function.  Left Atrium: Normal left atrial size.  Right Atrium: Normal right atrial size.  Pericardium: There is no evidence of pericardial effusion.  Mitral Valve: Grossly normal mitral valve with normal leaflet excursion.   No evidence of mitral stenosis. Trivial mitral regurgitation.  Tricuspid Valve: The tricuspid valve is not well visualized. No tricuspid   regurgitation visualized.  Aortic Valve: Trileaflet aortic valve with normal opening. No evidence of   aortic stenosis. No aortic regurgitation.  Pulmonic Valve: No pulmonic regurgitation.  Aorta: The aortic root and ascending aorta are normal in size.    < end of copied text >     74 yo F with PMHx of DM, HTN, HLD, CKD, Polyneuropathy, Depression, GERD presented to ED from [facility] for left facial, left hemiparesis, dysarthria, aphasia and neglect on the left. CTH showed no acute findings, chronic ischemic strokes. CTA Short segment occlusion of a right MCA posterior mid M2 branch. Evidence for corresponding ischemia in the right posterior frontal/parietal lobes. CTP shows a 24 cc penumbra. Pt was a code NI alert and was deemed not a candidate as MRS 3. MRI head Non contrast showed acute cortical infarcts in the right insular cortex and right frontotemporal operculum. No hemorrhagic transformation. TTE results below. HbA1c   Upon admission, she tested positive for COVID and was placed in isolation. During hospital stay, pt was found ot have a UTI and treated with Bactrim x 3 days, completed. Pt was started on DAPT therapy and continuing with home medication, atorvastatin 80mg. Pt also received an MCOT today from Electrophysiology team. Patient was evaluated by PT/OT who deemed patient a suitable candidate for a rehab facility. Pt is medically stable for discharge ____ to f/u with vascular neurology within the next 2-3 weeks for further management.     She was supposed to get ILR, but due to covid positive, she has to wait for 10 days. EP will send MCOT to [facility] and they will call her for instructions on how to use it.    Neurologic:  Mental status: awake, alert, oriented to place. Pt unable to correctly answer questions in regards to age and time. Speech is fluent, able to name objects. Follows commands. Attention/concentration intact. No dysarthria, no aphasia.  Cranial nerves:   II: visual fields are full to confrontation. pupils equally round and reactive to light,   III, IV, VI: EOMI without nystagmus  V:  V1-V3 sensation intact,   VII: minor left facial droop with normal eye closure and smile  IX, X: Uvula is midline and soft palate rises symmetrically  XI: head turning and shoulder shrug are intact.  XII: tongue midline  Motor: Normal bulk and tone, strength 5/5 in b/l UE and LE,  strength 5/5. No pronator drift  Sensation: intact to light touch. No neglect.  Coordination: No dysmetria on finger-to-nose and heel-to-shin  Reflexes: downgoing toes bilaterally  Gait: not assessed.     NIHSS during discharge: 3   mRS during discharge: 3    New meds upon dc:  O/p follow up: stroke clinic    Pt was accepted for 4A but daughter requested pt to be sent home with home services, needing hospital bed, wheelchair, commode. All scripts were given to CM/SW.    stable for dc today. 74 yo F with PMHx of DM, HTN, HLD, CKD, Polyneuropathy, Depression, GERD presented to ED from [facility] for left facial, left hemiparesis, dysarthria, aphasia and neglect on the left. CTH showed no acute findings, chronic ischemic strokes. CTA Short segment occlusion of a right MCA posterior mid M2 branch. Evidence for corresponding ischemia in the right posterior frontal/parietal lobes. CTP shows a 24 cc penumbra. Pt was a code NI alert and was deemed not a candidate as MRS 3. MRI head Non contrast showed acute cortical infarcts in the right insular cortex and right frontotemporal operculum. No hemorrhagic transformation. TTE results below. HbA1c   Upon admission, she tested positive for COVID and was placed in isolation. During hospital stay, pt was found ot have a UTI and treated with Bactrim x 3 days, completed. Pt was started on DAPT therapy and continuing with home medication, atorvastatin 80mg. Pt also received an MCOT today from Electrophysiology team. Patient was evaluated by PT/OT who deemed patient a suitable candidate for a rehab facility. Pt is medically stable for discharge ____ to f/u with vascular neurology within the next 2-3 weeks for further management.     She was supposed to get ILR, but due to covid positive, she has to wait for 10 days. EP will send MCOT to [facility] and they will call her for instructions on how to use it.    Neurologic:  Mental status: awake, alert, oriented to place. Pt unable to correctly answer questions in regards to age and time. Speech is fluent, able to name objects. Follows commands. Attention/concentration intact. No dysarthria, no aphasia.  Cranial nerves:   II: visual fields are full to confrontation. pupils equally round and reactive to light,   III, IV, VI: EOMI without nystagmus  V:  V1-V3 sensation intact,   VII: minor left facial droop with normal eye closure and smile  IX, X: Uvula is midline and soft palate rises symmetrically  XI: head turning and shoulder shrug are intact.  XII: tongue midline  Motor: Normal bulk and tone, strength 5/5 in b/l UE and LE,  strength 5/5. No pronator drift  Sensation: intact to light touch. No neglect.  Coordination: No dysmetria on finger-to-nose and heel-to-shin  Reflexes: downgoing toes bilaterally  Gait: not assessed.     NIHSS during discharge: 3   mRS during discharge: 3    New meds upon dc:  O/p follow up: stroke clinic    Pt was accepted for 4A but daughter requested pt to be sent home with home services, needing hospital bed, wheelchair, commode. All scripts were given to CM/SW.    stable for dc today. 74 yo F with PMHx of DM, HTN, HLD, CKD, Polyneuropathy, Depression, GERD presented to ED from [facility] for left facial, left hemiparesis, dysarthria, aphasia and neglect on the left. CTH showed no acute findings, chronic ischemic strokes. CTA Short segment occlusion of a right MCA posterior mid M2 branch. Evidence for corresponding ischemia in the right posterior frontal/parietal lobes. CTP shows a 24 cc penumbra. Pt was a code NI alert and was deemed not a candidate as MRS 3. MRI head Non contrast showed acute cortical infarcts in the right insular cortex and right frontotemporal operculum. No hemorrhagic transformation. TTE results below. HbA1c   Upon admission, she tested positive for COVID and was placed in isolation. During hospital stay, pt was found ot have a UTI and treated with Bactrim x 3 days, completed. Pt was started on DAPT therapy and continuing with home medication, atorvastatin 80mg. Pt also received an MCOT today from Electrophysiology team. Patient was evaluated by PT/OT who deemed patient a suitable candidate for a rehab facility. Pt is medically stable for discharge to [facility] with f/u appointment with vascular neurology, within the next 2-3 weeks for further management.     She was supposed to get ILR, but due to covid positive, she has to wait for 10 days. EP will send MCOT to [facility] and they will call her for instructions on how to use it.    Neurologic:  Mental status: awake, alert, oriented to place. Pt unable to correctly answer questions in regards to age and time. Speech is fluent, able to name objects. Follows commands. Attention/concentration intact. No dysarthria, no aphasia.  Cranial nerves:   II: visual fields are full to confrontation. pupils equally round and reactive to light,   III, IV, VI: EOMI without nystagmus  V:  V1-V3 sensation intact,   VII: minor left facial droop with normal eye closure and smile  IX, X: Uvula is midline and soft palate rises symmetrically  XI: head turning and shoulder shrug are intact.  XII: tongue midline  Motor: Normal bulk and tone, strength 5/5 in b/l UE and LE,  strength 5/5. No pronator drift  Sensation: intact to light touch. No neglect.  Coordination: No dysmetria on finger-to-nose and heel-to-shin  Reflexes: downgoing toes bilaterally  Gait: not assessed.     NIHSS during discharge: 3   mRS during discharge: 3    New meds upon dc:  O/p follow up: stroke clinic    Pt was accepted for 4A but daughter requested pt to be sent home with home services, needing hospital bed, wheelchair, commode. All scripts were given to CM/SW.    stable for dc today. 72 yo F with PMHx of DM, HTN, HLD, CKD, Polyneuropathy, Depression, GERD presented to ED from [facility] for left facial, left hemiparesis, dysarthria, aphasia and neglect on the left. CTH showed no acute findings, chronic ischemic strokes. CTA Short segment occlusion of a right MCA posterior mid M2 branch. Evidence for corresponding ischemia in the right posterior frontal/parietal lobes. CTP shows a 24 cc penumbra. Pt was a code NI alert and was deemed not a candidate as MRS 3. MRI head Non contrast showed acute cortical infarcts in the right insular cortex and right frontotemporal operculum. No hemorrhagic transformation. TTE results below. HbA1c   Upon admission, she tested positive for COVID and was placed in isolation. During hospital stay, pt was found ot have a UTI and treated with Bactrim x 3 days, completed. Pt was started on DAPT therapy and continuing with home medication, atorvastatin 80mg. Pt also received an MCOT today from Electrophysiology team. Patient was evaluated by PT/OT who deemed patient a suitable candidate for a rehab facility. Pt is medically stable for discharge to [facility] with f/u appointment with vascular neurology, within the next 2-3 weeks for further management.     She was supposed to get ILR, but due to covid positive, she has to wait for 10 days. EP will send MCOT to [facility] and they will call her for instructions on how to use it.    Neurologic:  Mental status: awake, alert, oriented to place. Pt unable to correctly answer questions in regards to age and time. Speech is fluent, able to name objects. Follows commands. Attention/concentration intact. No dysarthria, no aphasia.  Cranial nerves:   II: visual fields are full to confrontation. pupils equally round and reactive to light,   III, IV, VI: EOMI without nystagmus  V:  V1-V3 sensation intact,   VII: minor left facial droop with normal eye closure and smile  IX, X: Uvula is midline and soft palate rises symmetrically  XI: head turning and shoulder shrug are intact.  XII: tongue midline  Motor: Normal bulk and tone, strength 5/5 in b/l UE and LE,  strength 5/5. No pronator drift  Sensation: intact to light touch. No neglect.  Coordination: No dysmetria on finger-to-nose and heel-to-shin  Reflexes: downgoing toes bilaterally  Gait: not assessed.     NIHSS during discharge: 3   mRS during discharge: 3    New meds upon dc:  O/p follow up: stroke clinic    Pt was accepted for 4A but daughter requested pt to be sent home with home services, needing hospital bed, wheelchair, commode. All scripts were given to CM/SW.    stable for dc today. 74 yo F with PMHx of DM, HTN, HLD, CKD, Polyneuropathy, Depression, GERD presented to ED from [facility] for left facial, left hemiparesis, dysarthria, aphasia and neglect on the left. CTH showed no acute findings, chronic ischemic strokes. CTA Short segment occlusion of a right MCA posterior mid M2 branch. Evidence for corresponding ischemia in the right posterior frontal/parietal lobes. CTP shows a 24 cc penumbra. Pt was a code NI alert and was deemed not a candidate as MRS 3. MRI head Non contrast showed acute cortical infarcts in the right insular cortex and right frontotemporal operculum. No hemorrhagic transformation. TTE results below. HbA1c   Upon admission, she tested positive for COVID and was placed in isolation. During hospital stay, pt was found ot have a UTI and treated with Bactrim x 3 days, completed. Pt was started on DAPT therapy and continuing with home medication, atorvastatin 80mg. Pt also received an MCOT today from Electrophysiology team. Patient was evaluated by PT/OT who deemed patient a suitable candidate for a rehab facility, also by speech which recommended speech therapy at home for cognitive-linguisitic impairment and expressive/receptive aphasia. Pt is medically stable for discharge to [facility] with f/u appointment with vascular neurology, within the next 2-3 weeks for further management.     She was supposed to get ILR, but due to covid positive, she has to wait for 10 days. EP will send MCOT to [facility] and they will call her for instructions on how to use it.    Neurologic:  Mental status: awake, alert, oriented to place. Pt unable to correctly answer questions in regards to age and time. Speech is fluent, able to name objects. Follows commands. Attention/concentration intact. No dysarthria, no aphasia.  Cranial nerves:   II: visual fields are full to confrontation. pupils equally round and reactive to light,   III, IV, VI: EOMI without nystagmus  V:  V1-V3 sensation intact,   VII: minor left facial droop with normal eye closure and smile  IX, X: Uvula is midline and soft palate rises symmetrically  XI: head turning and shoulder shrug are intact.  XII: tongue midline  Motor: Normal bulk and tone, strength 5/5 in b/l UE and LE,  strength 5/5. No pronator drift  Sensation: intact to light touch. No neglect.  Coordination: No dysmetria on finger-to-nose and heel-to-shin  Reflexes: downgoing toes bilaterally  Gait: not assessed.     NIHSS during discharge: 3   mRS during discharge: 3    New meds upon dc:  O/p follow up: stroke clinic    Pt was accepted for 4A but daughter requested pt to be sent home with home services, needing hospital bed, wheelchair, commode. All scripts were given to CM/SW.    stable for dc today. 72 yo F with PMHx of DM, HTN, HLD, CKD, Polyneuropathy, Depression, GERD presented to ED from [facility] for left facial, left hemiparesis, dysarthria, aphasia and neglect on the left. CTH showed no acute findings, chronic ischemic strokes. CTA Short segment occlusion of a right MCA posterior mid M2 branch. Evidence for corresponding ischemia in the right posterior frontal/parietal lobes. CTP shows a 24 cc penumbra. Pt was a code NI alert and was deemed not a candidate as MRS 3. MRI head Non contrast showed acute cortical infarcts in the right insular cortex and right frontotemporal operculum. No hemorrhagic transformation. TTE results below. HbA1c   Upon admission, she tested positive for COVID and was placed in isolation. During hospital stay, pt was found ot have a UTI and treated with Bactrim x 3 days, completed. Pt was started on DAPT therapy and continuing with home medication, atorvastatin 80mg. Pt also received an MCOT today from Electrophysiology team. Patient was evaluated by PT/OT who deemed patient a suitable candidate for a rehab facility, also by speech which recommended speech therapy at home for cognitive-linguisitic impairment and expressive/receptive aphasia. Pt is medically stable for discharge to [facility] with f/u appointment with vascular neurology, within the next 2-3 weeks for further management.     She was supposed to get ILR, but due to covid positive, she has to wait for 10 days. EP will send MCOT to [facility] and they will call her for instructions on how to use it.    Neurologic:  Mental status: awake, alert, oriented to place. Pt unable to correctly answer questions in regards to age and time. Speech is fluent, able to name objects. Follows commands. Attention/concentration intact. No dysarthria, no aphasia.  Cranial nerves:   II: visual fields are full to confrontation. pupils equally round and reactive to light,   III, IV, VI: EOMI without nystagmus  V:  V1-V3 sensation intact,   VII: minor left facial droop with normal eye closure and smile  IX, X: Uvula is midline and soft palate rises symmetrically  XI: head turning and shoulder shrug are intact.  XII: tongue midline  Motor: Normal bulk and tone, strength 5/5 in b/l UE and LE,  strength 5/5. No pronator drift  Sensation: intact to light touch. No neglect.  Coordination: No dysmetria on finger-to-nose and heel-to-shin  Reflexes: downgoing toes bilaterally  Gait: not assessed.     NIHSS during discharge: 3   mRS during discharge: 3    New meds upon dc:  O/p follow up: stroke clinic    Pt was accepted for 4A but daughter requested pt to be sent home with home services, needing hospital bed, wheelchair, commode. All scripts were given to CM/SW.    stable for dc today. 72 yo F with PMHx of DM, HTN, HLD, CKD, Polyneuropathy, Depression, GERD presented to ED from [facility] for left facial, left hemiparesis, dysarthria, aphasia and neglect on the left. CTH showed no acute findings, chronic ischemic strokes. CTA Short segment occlusion of a right MCA posterior mid M2 branch. Evidence for corresponding ischemia in the right posterior frontal/parietal lobes. CTP shows a 24 cc penumbra. Pt was a code NI alert and was deemed not a candidate as MRS 3. MRI head Non contrast showed acute cortical infarcts in the right insular cortex and right frontotemporal operculum. No hemorrhagic transformation. TTE results below. HbA1c   Upon admission, she tested positive for COVID and was placed in isolation. During hospital stay, pt was found ot have a UTI and treated with Bactrim x 3 days, completed. Pt was started on DAPT therapy and continuing with home medication, atorvastatin 80mg. Pt also received an MCOT today from Electrophysiology team. Patient was evaluated by PT/OT who deemed patient a suitable candidate for a rehab facility, also by speech which recommended speech therapy at home for cognitive-linguisitic impairment and expressive/receptive aphasia. Pt is medically stable for discharge to [facility] with f/u appointment with vascular neurology, within the next 2-3 weeks for further management.     She was supposed to get ILR, but due to covid positive, she has to wait for 10 days. EP will send MCOT to [facility] and they will call her for instructions on how to use it.    Neurologic:  Mental status: awake, alert, oriented to place. Pt unable to correctly answer questions in regards to age and time. Speech is fluent, able to name objects. Follows commands. Attention/concentration intact. No dysarthria, no aphasia.  Cranial nerves:   II: visual fields are full to confrontation. pupils equally round and reactive to light,   III, IV, VI: EOMI without nystagmus  V:  V1-V3 sensation intact,   VII: minor left facial droop with normal eye closure and smile  IX, X: Uvula is midline and soft palate rises symmetrically  XI: head turning and shoulder shrug are intact.  XII: tongue midline  Motor: Normal bulk and tone, strength 5/5 in b/l UE and LE,  strength 5/5. No pronator drift  Sensation: intact to light touch. No neglect.  Coordination: No dysmetria on finger-to-nose and heel-to-shin  Reflexes: downgoing toes bilaterally  Gait: not assessed.     NIHSS during discharge: 2 (age, month)   mRS during discharge: 4    New meds upon dc: plavix x 90 days  O/p follow up: stroke clinic. EP    Pt was accepted for 4A but daughter requested pt to be sent home with home services needng hospital bed, wheelchair, commode. All scripts were given to CM/SW.    stable for dc today. 72 yo F with PMHx of DM, HTN, HLD, CKD, Polyneuropathy, Depression, GERD presented to ED from [facility] for left facial, left hemiparesis, dysarthria, aphasia and neglect on the left. CTH showed no acute findings, chronic ischemic strokes. CTA Short segment occlusion of a right MCA posterior mid M2 branch. Evidence for corresponding ischemia in the right posterior frontal/parietal lobes. CTP shows a 24 cc penumbra. Pt was a code NI alert and was deemed not a candidate as MRS 3. MRI head Non contrast showed acute cortical infarcts in the right insular cortex and right frontotemporal operculum. No hemorrhagic transformation. TTE results below. HbA1c   Upon admission, she tested positive for COVID and was placed in isolation. During hospital stay, pt was found ot have a UTI and treated with Bactrim x 3 days, completed. Pt was started on DAPT therapy and continuing with home medication, atorvastatin 80mg. Pt also received an MCOT today from Electrophysiology team. Patient was evaluated by PT/OT who deemed patient a suitable candidate for a rehab facility, also by speech which recommended speech therapy at home for cognitive-linguisitic impairment and expressive/receptive aphasia. Pt is medically stable for discharge to [facility] with f/u appointment with vascular neurology, within the next 2-3 weeks for further management.     She was supposed to get ILR, but due to covid positive, she has to wait for 10 days. EP will send MCOT to \[facility]\"" and they will call her for instructions on how to use it.    Neurologic:  Mental status: awake, alert, oriented to place. Pt unable to correctly answer questions in regards to age and time. Speech is fluent, able to name objects. Follows commands. Attention/concentration intact. No dysarthria, no aphasia.  Cranial nerves:   II: visual fields are full to confrontation. pupils equally round and reactive to light,   III, IV, VI: EOMI without nystagmus  V:  V1-V3 sensation intact,   VII: minor left facial droop with normal eye closure and smile  IX, X: Uvula is midline and soft palate rises symmetrically  XI: head turning and shoulder shrug are intact.  XII: tongue midline  Motor: Normal bulk and tone, strength 5/5 in b/l UE and LE,  strength 5/5. No pronator drift  Sensation: intact to light touch. No neglect.  Coordination: No dysmetria on finger-to-nose and heel-to-shin  Reflexes: downgoing toes bilaterally  Gait: not assessed.     NIHSS during discharge: 2 (age, month)   mRS during discharge: 4    New meds upon dc: plavix x 90 days  O/p follow up: stroke clinic. EP    Pt was accepted for 4A but daughter requested pt to be sent home with home services needng hospital bed, wheelchair, commode. All scripts were given to CM/SW.    stable for dc today. 72 yo F with PMHx of DM, HTN, HLD, CKD, Polyneuropathy, Depression, GERD presented to ED from [facility] for left facial, left hemiparesis, dysarthria, aphasia and neglect on the left. CTH showed no acute findings, chronic ischemic strokes. CTA Short segment occlusion of a right MCA posterior mid M2 branch. Evidence for corresponding ischemia in the right posterior frontal/parietal lobes. CTP shows a 24 cc penumbra. Pt was a code NI alert and was deemed not a candidate as MRS 3. MRI head Non contrast showed acute cortical infarcts in the right insular cortex and right frontotemporal operculum. No hemorrhagic transformation. TTE results below. HbA1c   Upon admission, she tested positive for COVID and was placed in isolation. During hospital stay, pt was found ot have a UTI and treated with Bactrim x 3 days, completed. Pt was started on DAPT therapy and continuing with home medication, atorvastatin 80mg. Pt also received an MCOT today from Electrophysiology team. Patient was evaluated by PT/OT who deemed patient a suitable candidate for a rehab facility, also by speech which recommended speech therapy at home for cognitive-linguisitic impairment and expressive/receptive aphasia. Pt is medically stable for discharge to [facility] with f/u appointment with vascular neurology, within the next 2-3 weeks for further management.     She was supposed to get ILR, but due to covid positive, she has to wait for 10 days. EP will send MCOT to [facility] and they will call her for instructions on how to use it.    Neurologic:  Mental status: awake, alert, oriented to place. Pt unable to correctly answer questions in regards to age and time. Speech is fluent, able to name objects. Follows commands. Attention/concentration intact. No dysarthria, no aphasia.  Cranial nerves:   II: visual fields are full to confrontation. pupils equally round and reactive to light,   III, IV, VI: EOMI without nystagmus  V:  V1-V3 sensation intact,   VII: minor left facial droop with normal eye closure and smile  IX, X: Uvula is midline and soft palate rises symmetrically  XI: head turning and shoulder shrug are intact.  XII: tongue midline  Motor: Normal bulk and tone, strength 5/5 in b/l UE and LE,  strength 5/5. No pronator drift  Sensation: intact to light touch. No neglect.  Coordination: No dysmetria on finger-to-nose and heel-to-shin  Reflexes: downgoing toes bilaterally  Gait: not assessed.     NIHSS during discharge: 2 (age, month)   mRS during discharge: 4    New meds upon dc: plavix x 90 days  O/p follow up: stroke clinic. EP    Pt was accepted for 4A but daughter requested pt to be sent home with home services needing hospital bed, wheelchair, commode. All scripts were given to CM/SW.    stable for dc today.    Stroke attending attestation:  Pt is a 72 yo F with PMHx of DM, HTN, HLD, CKD, who presented from NH with left hemiparesis and encephalopathy (likely toxic metabolic due to covid infection plus acute stroke). Covid +    Impr: right insular acute ischemic stroke, inferior branch of R M2  Etiology: ESUS  Continue DAPT x 90 days then ASA monotherapy and high intensity statin  Appreciate EP recs. Given Covid + status, MCOT then ILR placement as outpt  D/c home with home services, f/u in stroke clinic. 72 yo F with PMHx of DM, HTN, HLD, CKD, Polyneuropathy, Depression, GERD presented to ED from [facility] for left facial, left hemiparesis, dysarthria, aphasia and neglect on the left. CTH showed no acute findings, chronic ischemic strokes. CTA Short segment occlusion of a right MCA posterior mid M2 branch. Evidence for corresponding ischemia in the right posterior frontal/parietal lobes. CTP shows a 24 cc penumbra. Pt was a code NI alert and was deemed not a candidate as MRS 3. MRI head Non contrast showed acute cortical infarcts in the right insular cortex and right frontotemporal operculum. No hemorrhagic transformation. TTE results below. HbA1c   Upon admission, she tested positive for COVID and was placed in isolation. During hospital stay, pt was found ot have a UTI and treated with Bactrim x 3 days, completed. Pt was started on DAPT therapy and continuing with home medication, atorvastatin 80mg. Pt also received an MCOT today from Electrophysiology team. Patient was evaluated by PT/OT who deemed patient a suitable candidate for a rehab facility, also by speech which recommended speech therapy at home for cognitive-linguisitic impairment and expressive/receptive aphasia. Pt is medically stable for discharge to [facility] with f/u appointment with vascular neurology, within the next 2-3 weeks for further management.     She was supposed to get ILR, but due to covid positive, she has to wait for 10 days. EP will send MCOT to [facility] and they will call her for instructions on how to use it.    Neurologic:  Mental status: awake, alert, oriented to place. Pt unable to correctly answer questions in regards to age and time. Speech is fluent, able to name objects. Follows commands. Attention/concentration intact. No dysarthria, no aphasia.  Cranial nerves:   II: visual fields are full to confrontation. pupils equally round and reactive to light,   III, IV, VI: EOMI without nystagmus  V:  V1-V3 sensation intact,   VII: minor left facial droop with normal eye closure and smile  IX, X: Uvula is midline and soft palate rises symmetrically  XI: head turning and shoulder shrug are intact.  XII: tongue midline  Motor: Normal bulk and tone, strength 5/5 in b/l UE and LE,  strength 5/5. No pronator drift  Sensation: intact to light touch. No neglect.  Coordination: No dysmetria on finger-to-nose and heel-to-shin  Reflexes: downgoing toes bilaterally  Gait: not assessed.     NIHSS during discharge: 2 (age, month)   mRS during discharge: 4    New meds upon dc: plavix x 90 days  O/p follow up: stroke clinic. EP    Pt was accepted for 4A but daughter requested pt to be sent home with home services needing hospital bed, wheelchair, commode. All scripts were given to CM/SW.    stable for dc today.    Stroke attending attestation:  Pt is a 72 yo F with PMHx of DM, HTN, HLD, CKD, who presented from NH with left hemiparesis and encephalopathy (likely toxic metabolic due to covid infection plus acute stroke). Covid +    Impr: right insular acute ischemic stroke, inferior branch of R M2  Etiology: ESUS  Continue DAPT x 90 days then ASA monotherapy and high intensity statin  Appreciate EP recs. Given Covid + status, MCOT then ILR placement as outpt  D/c home with home services, f/u in stroke clinic.

## 2023-12-20 NOTE — CHART NOTE - NSCHARTNOTEFT_GEN_A_CORE
Medical Necessity for HOSPITAL BED and COMMODE    Patient is a 73 year-old woman admitted for acute stroke and will require a semi-electric hospital bed for discharge to home.    Patient requires head of bed to be elevated greater than thirty degrees due to: Patient is at high risk for aspiration   Patient requires body positioning in ways not feasible with an ordinary bed to alleviate pain.  Patient has limited mobility and cannot independently make changes in body position significant enough to alleviate pressure. Pillows and wedges have been tried and ruled out.  Patient will also benefit from gel overlay to prevent skin breakdown      Patient requires commode and is confined to one room and is risk to fall to ambulate to bathroom.

## 2023-12-20 NOTE — DISCHARGE NOTE PROVIDER - NSDCCPCAREPLAN_GEN_ALL_CORE_FT
PRINCIPAL DISCHARGE DIAGNOSIS  Diagnosis: Ischemic stroke  Assessment and Plan of Treatment:      PRINCIPAL DISCHARGE DIAGNOSIS  Diagnosis: Ischemic stroke  Assessment and Plan of Treatment: During this hospital admission, you had an ischemic stroke. During an ischemic stroke, blood stops flowing to part of your brain because of a blockage in the blood vessel. This can damage areas in the brain that control other parts of the body.  Please take your aspirin and plavix  for blood thinning and Atorvastatin for cholesterol medication/blood vessel protection as prescribed to prevent further strokes. Do not skip doses and do not run low on your medication. If you run low on your medication, please contact your doctor.  You will follow up outpatient with the stroke Nurse Practitioner as scheduled below.  Doing your regular tasks may be difficult after you've had a stroke, but you can learn new ways to manage your daily activities. In fact, doing daily activities may help you to regain muscle strength. Be patient, give yourself time to adjust, and appreciate the progress you make. For example, when showering or bathing, test the water temperature with a hand or foot that was not affected by the stroke, use grab bars, a shower seat, a hand-held showerhead, etc. It is normal to feel fatigue after a stroke, while some days may be worse than others, you will continue to improve.  Call 911 right away if you have any of the following symptoms of another stroke:  B: Balance: Sudden: Dizziness, loss of balance, or a sense of falling, difficulty with coordinating movement  E: Eyes: Sudden double vision or trouble seeing in one or both eyes  F: Face: Sudden uneven face  A: Arms (Legs): Sudden weakness, tingling, or loss of feeling on one side of your face or body  S: Speech: Sudden trouble talking or slurred speech, sudden difficulty understanding others  T: Time: Please call 911 right away and go to the emergency room  •Sudden, severe headache  •Blackouts or seizures     PRINCIPAL DISCHARGE DIAGNOSIS  Diagnosis: Ischemic stroke  Assessment and Plan of Treatment: During this hospital admission, you had an ischemic stroke. During an ischemic stroke, blood stops flowing to part of your brain because of a blockage in the blood vessel. This can damage areas in the brain that control other parts of the body.  Please take your aspirin and plavix  for blood thinning and Atorvastatin for cholesterol medication/blood vessel protection as prescribed to prevent further strokes. Do not skip doses and do not run low on your medication. If you run low on your medication, please contact your doctor.  You will follow up outpatient with the stroke Nurse Practitioner as scheduled below.  Doing your regular tasks may be difficult after you've had a stroke, but you can learn new ways to manage your daily activities. In fact, doing daily activities may help you to regain muscle strength. Be patient, give yourself time to adjust, and appreciate the progress you make. For example, when showering or bathing, test the water temperature with a hand or foot that was not affected by the stroke, use grab bars, a shower seat, a hand-held showerhead, etc. It is normal to feel fatigue after a stroke, while some days may be worse than others, you will continue to improve.  Call 911 right away if you have any of the following symptoms of another stroke:  B: Balance: Sudden: Dizziness, loss of balance, or a sense of falling, difficulty with coordinating movement  E: Eyes: Sudden double vision or trouble seeing in one or both eyes  F: Face: Sudden uneven face  A: Arms (Legs): Sudden weakness, tingling, or loss of feeling on one side of your face or body  S: Speech: Sudden trouble talking or slurred speech, sudden difficulty understanding others  T: Time: Please call 911 right away and go to the emergency room  •Sudden, severe headache  •Blackouts or seizures  Please take your aspirin and plavix every day for 21 days, followed by aspin alone. Please follow up with cardiac electrophysiology for the cardiac monitor results. Our stroke clinic office will contact you for     PRINCIPAL DISCHARGE DIAGNOSIS  Diagnosis: Ischemic stroke  Assessment and Plan of Treatment: During this hospital admission, you had an ischemic stroke. During an ischemic stroke, blood stops flowing to part of your brain because of a blockage in the blood vessel. This can damage areas in the brain that control other parts of the body.  Please take your aspirin and plavix  for blood thinning and Atorvastatin for cholesterol medication/blood vessel protection as prescribed to prevent further strokes. Do not skip doses and do not run low on your medication. If you run low on your medication, please contact your doctor.  Doing your regular tasks may be difficult after you've had a stroke, but you can learn new ways to manage your daily activities. In fact, doing daily activities may help you to regain muscle strength. Be patient, give yourself time to adjust, and appreciate the progress you make. For example, when showering or bathing, test the water temperature with a hand or foot that was not affected by the stroke, use grab bars, a shower seat, a hand-held showerhead, etc. It is normal to feel fatigue after a stroke, while some days may be worse than others, you will continue to improve.  Call 911 right away if you have any of the following symptoms of another stroke:  B: Balance: Sudden: Dizziness, loss of balance, or a sense of falling, difficulty with coordinating movement  E: Eyes: Sudden double vision or trouble seeing in one or both eyes  F: Face: Sudden uneven face  A: Arms (Legs): Sudden weakness, tingling, or loss of feeling on one side of your face or body  S: Speech: Sudden trouble talking or slurred speech, sudden difficulty understanding others  T: Time: Please call 911 right away and go to the emergency room  •Sudden, severe headache  •Blackouts or seizures  Please take your aspirin and plavix every day for 90 days, followed by aspirin alone. Hold your blood pressure meds for now. Please follow up with cardiac electrophysiology for the cardiac monitor results. Our stroke clinic office will contact you.

## 2023-12-20 NOTE — PROGRESS NOTE ADULT - SUBJECTIVE AND OBJECTIVE BOX
Neurology Stroke Progress Note    INTERVAL HPI/OVERNIGHT EVENTS:  Patient seen and examined. Today, pts mental status is unchanged since yesterday, remains confused. No ovn events.     MEDICATIONS  (STANDING):  acetaminophen     Tablet .. 650 milliGRAM(s) Oral every 12 hours  aspirin enteric coated 81 milliGRAM(s) Oral daily  atorvastatin 80 milliGRAM(s) Oral at bedtime  calcium carbonate 1250 mG  + Vitamin D (OsCal 500 + D) 1 Tablet(s) Oral two times a day  chlorhexidine 2% Cloths 1 Application(s) Topical daily  clopidogrel Tablet 75 milliGRAM(s) Oral daily  dextrose 5%. 1000 milliLiter(s) (50 mL/Hr) IV Continuous <Continuous>  dextrose 5%. 1000 milliLiter(s) (100 mL/Hr) IV Continuous <Continuous>  dextrose 50% Injectable 25 Gram(s) IV Push once  dextrose 50% Injectable 12.5 Gram(s) IV Push once  dextrose 50% Injectable 25 Gram(s) IV Push once  DULoxetine 30 milliGRAM(s) Oral daily  gabapentin 100 milliGRAM(s) Oral three times a day  gabapentin 300 milliGRAM(s) Oral daily  glucagon  Injectable 1 milliGRAM(s) IntraMuscular once  heparin   Injectable 5000 Unit(s) SubCutaneous every 12 hours  insulin lispro (ADMELOG) corrective regimen sliding scale   SubCutaneous three times a day before meals  melatonin 10 milliGRAM(s) Oral at bedtime  pantoprazole    Tablet 40 milliGRAM(s) Oral before breakfast  polyethylene glycol 3350 17 Gram(s) Oral daily  senna 2 Tablet(s) Oral at bedtime    MEDICATIONS  (PRN):  dextrose Oral Gel 15 Gram(s) Oral once PRN Blood Glucose LESS THAN 70 milliGRAM(s)/deciliter      Allergies    penicillin (Unknown)    Intolerances        ROS: As per HPI, otherwise negative    Vital Signs Last 24 Hrs  T(C): 36.3 (20 Dec 2023 11:39), Max: 36.4 (19 Dec 2023 16:10)  T(F): 97.4 (20 Dec 2023 11:39), Max: 97.6 (19 Dec 2023 21:00)  HR: 73 (20 Dec 2023 11:39) (71 - 85)  BP: 158/73 (20 Dec 2023 11:39) (100/71 - 158/73)  BP(mean): 105 (20 Dec 2023 11:39) (82 - 105)  RR: 20 (20 Dec 2023 11:39) (20 - 20)  SpO2: 92% (20 Dec 2023 11:39) (92% - 96%)    Parameters below as of 20 Dec 2023 11:39  Patient On (Oxygen Delivery Method): nasal cannula    Physical exam:  General: awake and alert, sitting comfortably, no acute distress    Neurologic:  Mental status: awake, alert, oriented to place. Pt unable to correctly answer questions in regards to age and time. Speech is fluent, able to name objects. Follows commands. Attention/concentration intact. No dysarthria, no aphasia.  Cranial nerves:   II: visual fields are full to confrontation. pupils equally round and reactive to light,   III, IV, VI: EOMI without nystagmus  V:  V1-V3 sensation intact,   VII: no droop observed today, normal eye closure and smile  IX, X: Uvula is midline and soft palate rises symmetrically  XI: head turning and shoulder shrug are intact.  XII: tongue midline  Motor: Normal bulk and tone, strength 5/5 in b/l UE and LE,  strength 5/5. No pronator drift  Sensation: intact to light touch. No neglect.  Coordination: No dysmetria on finger-to-nose and heel-to-shin  Reflexes: downgoing toes bilaterally  Gait: not assessed.     NIH SS: 2      LABS:                        9.5    6.24  )-----------( 200      ( 20 Dec 2023 06:16 )             28.3     12-20    140  |  107  |  16  ----------------------------<  91  4.1   |  23  |  1.8<H>    Ca    9.0      20 Dec 2023 06:16  Mg     1.7     12-20    TPro  5.6<L>  /  Alb  3.4<L>  /  TBili  0.3  /  DBili  x   /  AST  21  /  ALT  11  /  AlkPhos  119<H>  12-20      Urinalysis Basic - ( 20 Dec 2023 06:16 )    Color: x / Appearance: x / SG: x / pH: x  Gluc: 91 mg/dL / Ketone: x  / Bili: x / Urobili: x   Blood: x / Protein: x / Nitrite: x   Leuk Esterase: x / RBC: x / WBC x   Sq Epi: x / Non Sq Epi: x / Bacteria: x        RADIOLOGY & ADDITIONAL TESTS:    < from: MR Head No Cont (12.15.23 @ 17:00) >  IMPRESSION:    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 No hemorrhagic transformation.      < from: TTE Echo Complete w/o Contrast w/ Doppler (12.18.23 @ 10:49) >  ummary:   1. Left ventricular ejection fraction, by visual estimation, is 60 to   65%.   2. Normal left ventricular size and wall thicknesses, with normal   systolic and diastolic function.    PHYSICIAN INTERPRETATION:  Left Ventricle: Normal left ventricular size and wallthicknesses, with   normal systolic and diastolic function. Left ventricular ejection   fraction, by visual estimation, is 60 to 65%.  Right Ventricle: Normal right ventricular size and function.  Left Atrium: Normal left atrial size.  Right Atrium: Normal right atrial size.  Pericardium: There is no evidence of pericardial effusion.  Mitral Valve: Grossly normal mitral valve with normal leaflet excursion.   No evidence of mitral stenosis. Trivial mitral regurgitation.  Tricuspid Valve: The tricuspid valve is not well visualized. No tricuspid   regurgitation visualized.  Aortic Valve: Trileaflet aortic valve with normal opening. No evidence of   aortic stenosis. No aortic regurgitation.  Pulmonic Valve: No pulmonic regurgitation.  Aorta: The aortic root and ascending aorta are normal in size.    < from: CT Angio Neck Stroke Protocol w/ IV Cont (12.15.23 @ 11:48) >  IMPRESSION:    Short segment occlusion of a right MCA posterior mid M2 branch. Evidence   for corresponding ischemia in the right posterior frontal/parietal lobes:   Tmax >6s vol 29 cc, CBF <30% vol 5 cc; mismatch vol / penumbra 24 cc.    < end of copied text > Detail Level: Detailed Neurology Stroke Progress Note    INTERVAL HPI/OVERNIGHT EVENTS:  Patient seen and examined. Today, pts mental status is unchanged since yesterday, remains slightly confused. No Overnight events. Awaiting for equipments like bed, wheelchair, commode to be delivered at home.     MEDICATIONS  (STANDING):  acetaminophen     Tablet .. 650 milliGRAM(s) Oral every 12 hours  aspirin enteric coated 81 milliGRAM(s) Oral daily  atorvastatin 80 milliGRAM(s) Oral at bedtime  calcium carbonate 1250 mG  + Vitamin D (OsCal 500 + D) 1 Tablet(s) Oral two times a day  chlorhexidine 2% Cloths 1 Application(s) Topical daily  clopidogrel Tablet 75 milliGRAM(s) Oral daily  dextrose 5%. 1000 milliLiter(s) (50 mL/Hr) IV Continuous <Continuous>  dextrose 5%. 1000 milliLiter(s) (100 mL/Hr) IV Continuous <Continuous>  dextrose 50% Injectable 25 Gram(s) IV Push once  dextrose 50% Injectable 12.5 Gram(s) IV Push once  dextrose 50% Injectable 25 Gram(s) IV Push once  DULoxetine 30 milliGRAM(s) Oral daily  gabapentin 100 milliGRAM(s) Oral three times a day  gabapentin 300 milliGRAM(s) Oral daily  glucagon  Injectable 1 milliGRAM(s) IntraMuscular once  heparin   Injectable 5000 Unit(s) SubCutaneous every 12 hours  insulin lispro (ADMELOG) corrective regimen sliding scale   SubCutaneous three times a day before meals  melatonin 10 milliGRAM(s) Oral at bedtime  pantoprazole    Tablet 40 milliGRAM(s) Oral before breakfast  polyethylene glycol 3350 17 Gram(s) Oral daily  senna 2 Tablet(s) Oral at bedtime    MEDICATIONS  (PRN):  dextrose Oral Gel 15 Gram(s) Oral once PRN Blood Glucose LESS THAN 70 milliGRAM(s)/deciliter      Allergies    penicillin (Unknown)    Intolerances        ROS: As per HPI, otherwise negative    Vital Signs Last 24 Hrs  T(C): 36.3 (20 Dec 2023 11:39), Max: 36.4 (19 Dec 2023 16:10)  T(F): 97.4 (20 Dec 2023 11:39), Max: 97.6 (19 Dec 2023 21:00)  HR: 73 (20 Dec 2023 11:39) (71 - 85)  BP: 158/73 (20 Dec 2023 11:39) (100/71 - 158/73)  BP(mean): 105 (20 Dec 2023 11:39) (82 - 105)  RR: 20 (20 Dec 2023 11:39) (20 - 20)  SpO2: 92% (20 Dec 2023 11:39) (92% - 96%)    Parameters below as of 20 Dec 2023 11:39  Patient On (Oxygen Delivery Method): nasal cannula    Neurologic:  Mental status: awake, alert, oriented to place. Pt unable to correctly answer questions in regards to age and time. Speech is fluent, able to name objects. Follows commands. Attention/concentration intact. No dysarthria, no aphasia.  Cranial nerves:   II: visual fields are full to confrontation. pupils equally round and reactive to light,   III, IV, VI: EOMI without nystagmus  V:  V1-V3 sensation intact,   VII: no droop observed today, normal eye closure and smile  IX, X: Uvula is midline and soft palate rises symmetrically  XI: head turning and shoulder shrug are intact.  XII: tongue midline  Motor: Normal bulk and tone, strength 5/5 in b/l UE and LE,  strength 5/5. No pronator drift  Sensation: intact to light touch. No neglect.  Coordination: No dysmetria on finger-to-nose and heel-to-shin  Reflexes: downgoing toes bilaterally  Gait: not assessed.     NIH SS: 2      LABS:                        9.5    6.24  )-----------( 200      ( 20 Dec 2023 06:16 )             28.3     12-20    140  |  107  |  16  ----------------------------<  91  4.1   |  23  |  1.8<H>    Ca    9.0      20 Dec 2023 06:16  Mg     1.7     12-20    TPro  5.6<L>  /  Alb  3.4<L>  /  TBili  0.3  /  DBili  x   /  AST  21  /  ALT  11  /  AlkPhos  119<H>  12-20      Urinalysis Basic - ( 20 Dec 2023 06:16 )    Color: x / Appearance: x / SG: x / pH: x  Gluc: 91 mg/dL / Ketone: x  / Bili: x / Urobili: x   Blood: x / Protein: x / Nitrite: x   Leuk Esterase: x / RBC: x / WBC x   Sq Epi: x / Non Sq Epi: x / Bacteria: x        RADIOLOGY & ADDITIONAL TESTS:    < from: MR Head No Cont (12.15.23 @ 17:00) >  IMPRESSION:    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 No hemorrhagic transformation.      < from: TTE Echo Complete w/o Contrast w/ Doppler (12.18.23 @ 10:49) >  ummary:   1. Left ventricular ejection fraction, by visual estimation, is 60 to   65%.   2. Normal left ventricular size and wall thicknesses, with normal   systolic and diastolic function.    PHYSICIAN INTERPRETATION:  Left Ventricle: Normal left ventricular size and wallthicknesses, with   normal systolic and diastolic function. Left ventricular ejection   fraction, by visual estimation, is 60 to 65%.  Right Ventricle: Normal right ventricular size and function.  Left Atrium: Normal left atrial size.  Right Atrium: Normal right atrial size.  Pericardium: There is no evidence of pericardial effusion.  Mitral Valve: Grossly normal mitral valve with normal leaflet excursion.   No evidence of mitral stenosis. Trivial mitral regurgitation.  Tricuspid Valve: The tricuspid valve is not well visualized. No tricuspid   regurgitation visualized.  Aortic Valve: Trileaflet aortic valve with normal opening. No evidence of   aortic stenosis. No aortic regurgitation.  Pulmonic Valve: No pulmonic regurgitation.  Aorta: The aortic root and ascending aorta are normal in size.    < from: CT Angio Neck Stroke Protocol w/ IV Cont (12.15.23 @ 11:48) >  IMPRESSION:    Short segment occlusion of a right MCA posterior mid M2 branch. Evidence   for corresponding ischemia in the right posterior frontal/parietal lobes:   Tmax >6s vol 29 cc, CBF <30% vol 5 cc; mismatch vol / penumbra 24 cc.    < end of copied text > Quality 317: Preventative Care And Screening: Screening For High Blood Pressure And Follow-Up Documented: Pre-hypertensive or hypertensive blood pressure reading documented, and the indicated follow-up is documented

## 2023-12-20 NOTE — DISCHARGE NOTE PROVIDER - NSDCHHNEEDSERVICE_GEN_ALL_CORE
Observation and assessment/Rehabilitation services Medication teaching and assessment/Observation and assessment/Rehabilitation services/Teaching and training

## 2023-12-20 NOTE — DISCHARGE NOTE PROVIDER - NSDCMRMEDTOKEN_GEN_ALL_CORE_FT
atorvastatin 40 mg oral tablet: 1 tab(s) orally once a day (at bedtime)  calcium-vitamin D 500 mg-5 mcg (200 intl units) oral tablet: 1 tab(s) orally 2 times a day  clotrimazole 1% topical cream: Apply topically to affected area 2 times a day  Cranberry oral tablet: 450 milligram(s) orally once a day  dapagliflozin 10 mg oral tablet: 1 tab(s) orally once a day  DULoxetine 30 mg oral delayed release capsule: 1 cap(s) orally once a day (at bedtime)  gabapentin 100 mg oral capsule: 1 cap(s) orally 3 times a day  gabapentin 300 mg oral tablet: 1 tab(s) orally once a day  ibuprofen 400 mg oral tablet: 1 tab(s) orally 2 times a day  melatonin 10 mg oral tablet: 1 tab(s) orally once a day (at bedtime)  metoprolol succinate 25 mg oral capsule, extended release: 1 cap(s) orally once a day  pantoprazole 40 mg oral delayed release tablet: 1 tab(s) orally once a day  telmisartan 40 mg oral tablet: 1 tab(s) orally once a day  Tylenol 325 mg oral capsule: 1 cap(s) orally 3 times a day as needed for  pain  Vitamin A and D topical ointment: Apply topically to affected area once a day   Aspirin Low Dose 81 mg oral tablet, chewable: 1 tab(s) orally once a day  atorvastatin 80 mg oral tablet: 1 tab(s) orally once a day (at bedtime)  calcium-vitamin D 500 mg-5 mcg (200 intl units) oral tablet: 1 tab(s) orally 2 times a day  clopidogrel 75 mg oral tablet: 1 tab(s) orally once a day  Cranberry oral tablet: 450 milligram(s) orally once a day  dapagliflozin 10 mg oral tablet: 1 tab(s) orally once a day  DULoxetine 30 mg oral delayed release capsule: 1 cap(s) orally once a day (at bedtime)  gabapentin 100 mg oral capsule: 1 cap(s) orally 3 times a day  gabapentin 300 mg oral tablet: 1 tab(s) orally once a day  melatonin 10 mg oral tablet: 1 tab(s) orally once a day (at bedtime)  metoprolol succinate 25 mg oral capsule, extended release: 1 cap(s) orally once a day  pantoprazole 40 mg oral delayed release tablet: 1 tab(s) orally once a day  polyethylene glycol 3350 oral powder for reconstitution: 17 gram(s) orally once a day  senna leaf extract oral tablet: 2 tab(s) orally once a day (at bedtime)  Vitamin A and D topical ointment: Apply topically to affected area once a day

## 2023-12-20 NOTE — DISCHARGE NOTE PROVIDER - NSDCFUSCHEDAPPT_GEN_ALL_CORE_FT
[facility]  ELECTROPH [address] [facility]  Scheduled Appointment: 01/25/2024     [facility]  ELECTROPH [address] [facility]  Scheduled Appointment: 01/25/2024

## 2023-12-20 NOTE — DISCHARGE NOTE PROVIDER - CARE PROVIDER_API CALL
Dwaine Christina  Neurology  [address] [place] [id number]  Phone: [phone]  Fax: [phone]  Follow Up Time:    Dwaine Christina  Neurology  [address] [place] [id number]  Phone: [phone]  Fax: [phone]  Follow Up Time:

## 2023-12-20 NOTE — CHART NOTE - NSCHARTNOTEFT_GEN_A_CORE
Patient has a mobility limitation that significantly impairs the pts ability to participate in one or more MRADLs such as toileting, eating, dressing, and bathing in customary locations in the home. The patients home provides adequate access between rooms for the use of the manual wheelchair. The wheelchair will significantly improve the patients ability to participate in MRADLs and will be used on a regular basis at home. The pts mobility limitation cannot be resolved by the use of a walker or cane. The pt has sufficient upper extremity function to safely propel the manual wheelchair that is provided in the home. Patient has a mobility limitation that significantly impairs the pts ability to participate in one or more MRADLs such as toileting, eating, dressing, and bathing in customary locations in the home. The patients home provides adequate access between rooms for the use of the manual wheelchair. The wheelchair will significantly improve the patients ability to participate in MRADLs and will be used on a regular basis at home. The pts mobility limitation cannot be resolved by the use of a walker or cane. The pt has sufficient upper extremity function to safely propel the manual wheelchair that is provided in the home.    The beneficiary has not expressed an unwillingness to use the manual wheelchair that is provided in the home.

## 2023-12-20 NOTE — PROGRESS NOTE ADULT - NS ATTEND AMEND GEN_ALL_CORE FT
Pt is a 74 yo F with PMHx of DM, HTN, HLD, CKD, who presented from NH with left hemiparesis and encephalopathy (likely toxic metabolic due to covid infection plus acute stroke). Covid +    Impr: right insular acute ischemic stroke, inferior branch of R M2  Etiology: ESUS  Continue DAPT/high intensity statin  Appreciate EP recs. Given Covid + status, MCOT then ILR placement as outpt  PT/OT/ST, tele, -160, dispo planning- home tomorrow with outpt services Pt is a 72 yo F with PMHx of DM, HTN, HLD, CKD, who presented from NH with left hemiparesis and encephalopathy (likely toxic metabolic due to covid infection plus acute stroke). Covid +    Impr: right insular acute ischemic stroke, inferior branch of R M2  Etiology: ESUS  Continue DAPT/high intensity statin  Appreciate EP recs. Given Covid + status, MCOT then ILR placement as outpt  PT/OT/ST, tele, -160, dispo planning- home tomorrow with outpt services

## 2023-12-20 NOTE — CHART NOTE - NSCHARTNOTEFT_GEN_A_CORE
Electrophysiology PA Note    Due to COVID isolation, no ILR at this time  MCOT will be mailed from [employer] to the patient home  Instructions are given to patient's daughter Steffi Mcgill  follow up with Dr Yang in 6 weeks Electrophysiology PA Note    Due to COVID isolation, no ILR at this time  MCOT will be mailed from [employer] to the patient home  Instructions are given to patient's daughter Steffi Mcgill  follow up with Dr Yang in 6 weeks

## 2023-12-20 NOTE — PROGRESS NOTE ADULT - ASSESSMENT
Assessment	  74 yo F with PMHx of DM, HTN, HLD, CKD, Polyneuropathy, Depression, GERD presented from [facility] for left facial, left hemiparesis, dysarthria, aphasia and neglect on the left. CTH showed no acute findings, chronic ischemic strokes. CTA Short segment occlusion of a right MCA posterior mid M2 branch. Evidence for corresponding ischemia in the right posterior frontal/parietal lobes. CTP shows a 24 cc penumbra. Pt was a code NI alert and was deemed not a candidate as MRS 3. She also tested positive for COVID. Admitted to CEU currently, likely discharged tomorrow pending delivery of home supplies as well as MCOT device to home.     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, workup pending  - CTA H&N and CTP: above  - C/w Aspirin 81mg  - started Plavix today   - C/w Atorvastatin 80mg daily  - MRI Brain Non-con: above   - TTE: results above   - MCOT will be delivered to house tomorrow when pt it d/c   - HbA1c 5.4, TSH and LDL 70  - Tele monitoring  -SBP: 120-160  - PT recommended home rehab   - Fall and Aspiration precautions  - Tele monitoring  - Neurochecks q4 hrs  - Provide stroke education    #UTI  - UA positive  - f.u urine culture  - start Bactrim for 3 days as pt is allergic to Penicillin, completed course on 12/19     #Elevated trops  - likely due to type 2 MI damand ischemia  - no need to trend trops    #DM  - insulin SS for now  - monitor FS    #HTN  - hold HTN meds for now  - permissive HTN    #Polyneuropathy  - c/w gabapentin    #Depression  - c/w cymbalta    #CKD  - monitor cr  - elevated trops, no need to trend now    #COVID pos  - monitor for now    #GERD  - c/w pantoprazole    #Misc:  - DVT Prophylaxis: heparin  - Diet: regular diet as per speech  - GI Prophylaxis: pantoprazole  - Code status: Full code  - Dispo: 2A CEU       Pending:  Pt will likely d/c tomorrow, pending delivery of supplies to house and MCOT device delivery to house tomorrow.    Assessment	  72 yo F with PMHx of DM, HTN, HLD, CKD, Polyneuropathy, Depression, GERD presented from [facility] for left facial, left hemiparesis, dysarthria, aphasia and neglect on the left. CTH showed no acute findings, chronic ischemic strokes. CTA Short segment occlusion of a right MCA posterior mid M2 branch. Evidence for corresponding ischemia in the right posterior frontal/parietal lobes. CTP shows a 24 cc penumbra. Pt was a code NI alert and was deemed not a candidate as MRS 3. She also tested positive for COVID. Admitted to CEU currently, likely discharged tomorrow pending delivery of home supplies as well as MCOT device to home.     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, workup pending  - CTA H&N and CTP: above  - C/w Aspirin 81mg  - started Plavix today   - C/w Atorvastatin 80mg daily  - MRI Brain Non-con: above   - TTE: results above   - MCOT will be delivered to house tomorrow when pt it d/c   - HbA1c 5.4, TSH and LDL 70  - Tele monitoring  -SBP: 120-160  - PT recommended home rehab   - Fall and Aspiration precautions  - Tele monitoring  - Neurochecks q4 hrs  - Provide stroke education    #UTI  - UA positive  - f.u urine culture  - start Bactrim for 3 days as pt is allergic to Penicillin, completed course on 12/19     #Elevated trops  - likely due to type 2 MI damand ischemia  - no need to trend trops    #DM  - insulin SS for now  - monitor FS    #HTN  - hold HTN meds for now  - permissive HTN    #Polyneuropathy  - c/w gabapentin    #Depression  - c/w cymbalta    #CKD  - monitor cr  - elevated trops, no need to trend now    #COVID pos  - monitor for now    #GERD  - c/w pantoprazole    #Misc:  - DVT Prophylaxis: heparin  - Diet: regular diet as per speech  - GI Prophylaxis: pantoprazole  - Code status: Full code  - Dispo: 2A CEU       Pending:  Pt will likely d/c tomorrow, pending delivery of supplies to house and MCOT device delivery to house tomorrow.    Assessment	  74 yo F with PMHx of DM, HTN, HLD, CKD, Polyneuropathy, Depression, GERD presented from [facility] for left facial, left hemiparesis, dysarthria, aphasia and neglect on the left. CTH showed no acute findings, chronic ischemic strokes. CTA Short segment occlusion of a right MCA posterior mid M2 branch. Evidence for corresponding ischemia in the right posterior frontal/parietal lobes. CTP shows a 24 cc penumbra. Pt was a code NI alert and was deemed not a candidate as MRS 3. She also tested positive for COVID. Admitted to CEU currently, likely discharged tomorrow pending delivery of home supplies as well as MCOT device to home.     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  - CTA H&N and CTP: above  - C/w Aspirin 81mg  - started Plavix today   - C/w Atorvastatin 80mg daily  - MRI Brain Non-con: above   - TTE: results above   - MCOT will be delivered to house tomorrow when pt it d/c   - HbA1c 5.4, TSH and LDL 70  - Tele monitoring  -SBP: 120-160  - PT recommended home rehab   - Fall and Aspiration precautions  - Tele monitoring  - Neurochecks q4 hrs  - Provide stroke education    #UTI  - UA positive  - f.u urine culture  - start Bactrim for 3 days as pt is allergic to Penicillin, completed course on 12/19     #Elevated trops  - likely due to type 2 MI damand ischemia  - no need to trend trops    #DM  - insulin SS for now  - monitor FS    #HTN  - hold HTN meds for now  - permissive HTN    #Polyneuropathy  - c/w gabapentin    #Depression  - c/w cymbalta    #CKD  - monitor cr  - elevated trops, no need to trend now    #COVID pos  - monitor for now    #GERD  - c/w pantoprazole    #Misc:  - DVT Prophylaxis: heparin  - Diet: regular diet as per speech  - GI Prophylaxis: pantoprazole  - Code status: Full code  - Dispo: 2A CEU       Pending:  Pt will likely d/c tomorrow, pending delivery of supplies to house and MCOT device delivery to house tomorrow.     Spoke with daughter Steffi over the phone on 12/20, updated her with the plan. All questions answered.    Assessment	  72 yo F with PMHx of DM, HTN, HLD, CKD, Polyneuropathy, Depression, GERD presented from [facility] for left facial, left hemiparesis, dysarthria, aphasia and neglect on the left. CTH showed no acute findings, chronic ischemic strokes. CTA Short segment occlusion of a right MCA posterior mid M2 branch. Evidence for corresponding ischemia in the right posterior frontal/parietal lobes. CTP shows a 24 cc penumbra. Pt was a code NI alert and was deemed not a candidate as MRS 3. She also tested positive for COVID. Admitted to CEU currently, likely discharged tomorrow pending delivery of home supplies as well as MCOT device to home.     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  - CTA H&N and CTP: above  - C/w Aspirin 81mg  - started Plavix today   - C/w Atorvastatin 80mg daily  - MRI Brain Non-con: above   - TTE: results above   - MCOT will be delivered to house tomorrow when pt it d/c   - HbA1c 5.4, TSH and LDL 70  - Tele monitoring  -SBP: 120-160  - PT recommended home rehab   - Fall and Aspiration precautions  - Tele monitoring  - Neurochecks q4 hrs  - Provide stroke education    #UTI  - UA positive  - f.u urine culture  - start Bactrim for 3 days as pt is allergic to Penicillin, completed course on 12/19     #Elevated trops  - likely due to type 2 MI damand ischemia  - no need to trend trops    #DM  - insulin SS for now  - monitor FS    #HTN  - hold HTN meds for now  - permissive HTN    #Polyneuropathy  - c/w gabapentin    #Depression  - c/w cymbalta    #CKD  - monitor cr  - elevated trops, no need to trend now    #COVID pos  - monitor for now    #GERD  - c/w pantoprazole    #Misc:  - DVT Prophylaxis: heparin  - Diet: regular diet as per speech  - GI Prophylaxis: pantoprazole  - Code status: Full code  - Dispo: 2A CEU       Pending:  Pt will likely d/c tomorrow, pending delivery of supplies to house and MCOT device delivery to house tomorrow.     Spoke with daughter Steffi over the phone on 12/20, updated her with the plan. All questions answered.

## 2023-12-21 VITALS
RESPIRATION RATE: 20 BRPM | TEMPERATURE: 98 F | HEART RATE: 80 BPM | DIASTOLIC BLOOD PRESSURE: 41 MMHG | SYSTOLIC BLOOD PRESSURE: 135 MMHG | OXYGEN SATURATION: 100 %

## 2023-12-21 PROBLEM — E11.9 TYPE 2 DIABETES MELLITUS WITHOUT COMPLICATIONS: Chronic | Status: ACTIVE | Noted: 2023-12-15

## 2023-12-21 PROBLEM — G62.9 POLYNEUROPATHY, UNSPECIFIED: Chronic | Status: ACTIVE | Noted: 2023-12-15

## 2023-12-21 PROBLEM — N18.9 CHRONIC KIDNEY DISEASE, UNSPECIFIED: Chronic | Status: ACTIVE | Noted: 2023-12-15

## 2023-12-21 PROBLEM — E78.5 HYPERLIPIDEMIA, UNSPECIFIED: Chronic | Status: ACTIVE | Noted: 2023-12-15

## 2023-12-21 PROBLEM — F32.9 MAJOR DEPRESSIVE DISORDER, SINGLE EPISODE, UNSPECIFIED: Chronic | Status: ACTIVE | Noted: 2023-12-15

## 2023-12-21 LAB
ALBUMIN SERPL ELPH-MCNC: 3.5 G/DL — SIGNIFICANT CHANGE UP (ref 3.5–5.2)
ALBUMIN SERPL ELPH-MCNC: 3.5 G/DL — SIGNIFICANT CHANGE UP (ref 3.5–5.2)
ALP SERPL-CCNC: 129 U/L — HIGH (ref 30–115)
ALP SERPL-CCNC: 129 U/L — HIGH (ref 30–115)
ALT FLD-CCNC: 13 U/L — SIGNIFICANT CHANGE UP (ref 0–41)
ALT FLD-CCNC: 13 U/L — SIGNIFICANT CHANGE UP (ref 0–41)
ANION GAP SERPL CALC-SCNC: 9 MMOL/L — SIGNIFICANT CHANGE UP (ref 7–14)
ANION GAP SERPL CALC-SCNC: 9 MMOL/L — SIGNIFICANT CHANGE UP (ref 7–14)
AST SERPL-CCNC: 24 U/L — SIGNIFICANT CHANGE UP (ref 0–41)
AST SERPL-CCNC: 24 U/L — SIGNIFICANT CHANGE UP (ref 0–41)
BASOPHILS # BLD AUTO: 0.04 K/UL — SIGNIFICANT CHANGE UP (ref 0–0.2)
BASOPHILS # BLD AUTO: 0.04 K/UL — SIGNIFICANT CHANGE UP (ref 0–0.2)
BASOPHILS NFR BLD AUTO: 0.5 % — SIGNIFICANT CHANGE UP (ref 0–1)
BASOPHILS NFR BLD AUTO: 0.5 % — SIGNIFICANT CHANGE UP (ref 0–1)
BILIRUB SERPL-MCNC: 0.4 MG/DL — SIGNIFICANT CHANGE UP (ref 0.2–1.2)
BILIRUB SERPL-MCNC: 0.4 MG/DL — SIGNIFICANT CHANGE UP (ref 0.2–1.2)
BUN SERPL-MCNC: 14 MG/DL — SIGNIFICANT CHANGE UP (ref 10–20)
BUN SERPL-MCNC: 14 MG/DL — SIGNIFICANT CHANGE UP (ref 10–20)
CALCIUM SERPL-MCNC: 9.2 MG/DL — SIGNIFICANT CHANGE UP (ref 8.4–10.4)
CALCIUM SERPL-MCNC: 9.2 MG/DL — SIGNIFICANT CHANGE UP (ref 8.4–10.4)
CHLORIDE SERPL-SCNC: 105 MMOL/L — SIGNIFICANT CHANGE UP (ref 98–110)
CHLORIDE SERPL-SCNC: 105 MMOL/L — SIGNIFICANT CHANGE UP (ref 98–110)
CO2 SERPL-SCNC: 25 MMOL/L — SIGNIFICANT CHANGE UP (ref 17–32)
CO2 SERPL-SCNC: 25 MMOL/L — SIGNIFICANT CHANGE UP (ref 17–32)
CREAT SERPL-MCNC: 1.7 MG/DL — HIGH (ref 0.7–1.5)
CREAT SERPL-MCNC: 1.7 MG/DL — HIGH (ref 0.7–1.5)
EGFR: 31 ML/MIN/1.73M2 — LOW
EGFR: 31 ML/MIN/1.73M2 — LOW
EOSINOPHIL # BLD AUTO: 0.5 K/UL — SIGNIFICANT CHANGE UP (ref 0–0.7)
EOSINOPHIL # BLD AUTO: 0.5 K/UL — SIGNIFICANT CHANGE UP (ref 0–0.7)
EOSINOPHIL NFR BLD AUTO: 6.5 % — SIGNIFICANT CHANGE UP (ref 0–8)
EOSINOPHIL NFR BLD AUTO: 6.5 % — SIGNIFICANT CHANGE UP (ref 0–8)
GLUCOSE SERPL-MCNC: 93 MG/DL — SIGNIFICANT CHANGE UP (ref 70–99)
GLUCOSE SERPL-MCNC: 93 MG/DL — SIGNIFICANT CHANGE UP (ref 70–99)
HCT VFR BLD CALC: 30.8 % — LOW (ref 37–47)
HCT VFR BLD CALC: 30.8 % — LOW (ref 37–47)
HGB BLD-MCNC: 10.1 G/DL — LOW (ref 12–16)
HGB BLD-MCNC: 10.1 G/DL — LOW (ref 12–16)
IMM GRANULOCYTES NFR BLD AUTO: 0.3 % — SIGNIFICANT CHANGE UP (ref 0.1–0.3)
IMM GRANULOCYTES NFR BLD AUTO: 0.3 % — SIGNIFICANT CHANGE UP (ref 0.1–0.3)
LYMPHOCYTES # BLD AUTO: 2.12 K/UL — SIGNIFICANT CHANGE UP (ref 1.2–3.4)
LYMPHOCYTES # BLD AUTO: 2.12 K/UL — SIGNIFICANT CHANGE UP (ref 1.2–3.4)
LYMPHOCYTES # BLD AUTO: 27.5 % — SIGNIFICANT CHANGE UP (ref 20.5–51.1)
LYMPHOCYTES # BLD AUTO: 27.5 % — SIGNIFICANT CHANGE UP (ref 20.5–51.1)
MAGNESIUM SERPL-MCNC: 2.1 MG/DL — SIGNIFICANT CHANGE UP (ref 1.8–2.4)
MAGNESIUM SERPL-MCNC: 2.1 MG/DL — SIGNIFICANT CHANGE UP (ref 1.8–2.4)
MCHC RBC-ENTMCNC: 31.2 PG — HIGH (ref 27–31)
MCHC RBC-ENTMCNC: 31.2 PG — HIGH (ref 27–31)
MCHC RBC-ENTMCNC: 32.8 G/DL — SIGNIFICANT CHANGE UP (ref 32–37)
MCHC RBC-ENTMCNC: 32.8 G/DL — SIGNIFICANT CHANGE UP (ref 32–37)
MCV RBC AUTO: 95.1 FL — SIGNIFICANT CHANGE UP (ref 81–99)
MCV RBC AUTO: 95.1 FL — SIGNIFICANT CHANGE UP (ref 81–99)
MONOCYTES # BLD AUTO: 0.62 K/UL — HIGH (ref 0.1–0.6)
MONOCYTES # BLD AUTO: 0.62 K/UL — HIGH (ref 0.1–0.6)
MONOCYTES NFR BLD AUTO: 8 % — SIGNIFICANT CHANGE UP (ref 1.7–9.3)
MONOCYTES NFR BLD AUTO: 8 % — SIGNIFICANT CHANGE UP (ref 1.7–9.3)
NEUTROPHILS # BLD AUTO: 4.41 K/UL — SIGNIFICANT CHANGE UP (ref 1.4–6.5)
NEUTROPHILS # BLD AUTO: 4.41 K/UL — SIGNIFICANT CHANGE UP (ref 1.4–6.5)
NEUTROPHILS NFR BLD AUTO: 57.2 % — SIGNIFICANT CHANGE UP (ref 42.2–75.2)
NEUTROPHILS NFR BLD AUTO: 57.2 % — SIGNIFICANT CHANGE UP (ref 42.2–75.2)
NRBC # BLD: 0 /100 WBCS — SIGNIFICANT CHANGE UP (ref 0–0)
NRBC # BLD: 0 /100 WBCS — SIGNIFICANT CHANGE UP (ref 0–0)
PLATELET # BLD AUTO: 238 K/UL — SIGNIFICANT CHANGE UP (ref 130–400)
PLATELET # BLD AUTO: 238 K/UL — SIGNIFICANT CHANGE UP (ref 130–400)
PMV BLD: 9.5 FL — SIGNIFICANT CHANGE UP (ref 7.4–10.4)
PMV BLD: 9.5 FL — SIGNIFICANT CHANGE UP (ref 7.4–10.4)
POTASSIUM SERPL-MCNC: 4 MMOL/L — SIGNIFICANT CHANGE UP (ref 3.5–5)
POTASSIUM SERPL-MCNC: 4 MMOL/L — SIGNIFICANT CHANGE UP (ref 3.5–5)
POTASSIUM SERPL-SCNC: 4 MMOL/L — SIGNIFICANT CHANGE UP (ref 3.5–5)
POTASSIUM SERPL-SCNC: 4 MMOL/L — SIGNIFICANT CHANGE UP (ref 3.5–5)
PROT SERPL-MCNC: 6.1 G/DL — SIGNIFICANT CHANGE UP (ref 6–8)
PROT SERPL-MCNC: 6.1 G/DL — SIGNIFICANT CHANGE UP (ref 6–8)
RBC # BLD: 3.24 M/UL — LOW (ref 4.2–5.4)
RBC # BLD: 3.24 M/UL — LOW (ref 4.2–5.4)
RBC # FLD: 12.3 % — SIGNIFICANT CHANGE UP (ref 11.5–14.5)
RBC # FLD: 12.3 % — SIGNIFICANT CHANGE UP (ref 11.5–14.5)
SODIUM SERPL-SCNC: 139 MMOL/L — SIGNIFICANT CHANGE UP (ref 135–146)
SODIUM SERPL-SCNC: 139 MMOL/L — SIGNIFICANT CHANGE UP (ref 135–146)
WBC # BLD: 7.71 K/UL — SIGNIFICANT CHANGE UP (ref 4.8–10.8)
WBC # BLD: 7.71 K/UL — SIGNIFICANT CHANGE UP (ref 4.8–10.8)
WBC # FLD AUTO: 7.71 K/UL — SIGNIFICANT CHANGE UP (ref 4.8–10.8)
WBC # FLD AUTO: 7.71 K/UL — SIGNIFICANT CHANGE UP (ref 4.8–10.8)

## 2023-12-21 PROCEDURE — 99232 SBSQ HOSP IP/OBS MODERATE 35: CPT

## 2023-12-21 PROCEDURE — 99238 HOSP IP/OBS DSCHRG MGMT 30/<: CPT

## 2023-12-21 RX ORDER — CLOPIDOGREL BISULFATE 75 MG/1
1 TABLET, FILM COATED ORAL
Qty: 90 | Refills: 0
Start: 2023-12-21 | End: 2024-03-19

## 2023-12-21 RX ORDER — POLYETHYLENE GLYCOL 3350 17 G/17G
17 POWDER, FOR SOLUTION ORAL
Qty: 0 | Refills: 0 | DISCHARGE
Start: 2023-12-21

## 2023-12-21 RX ORDER — ATORVASTATIN CALCIUM 80 MG/1
1 TABLET, FILM COATED ORAL
Refills: 0 | DISCHARGE

## 2023-12-21 RX ORDER — ATORVASTATIN CALCIUM 80 MG/1
1 TABLET, FILM COATED ORAL
Qty: 90 | Refills: 6
Start: 2023-12-21

## 2023-12-21 RX ORDER — SENNA PLUS 8.6 MG/1
2 TABLET ORAL
Qty: 0 | Refills: 0 | DISCHARGE
Start: 2023-12-21

## 2023-12-21 RX ORDER — IBUPROFEN 200 MG
1 TABLET ORAL
Refills: 0 | DISCHARGE

## 2023-12-21 RX ORDER — ACETAMINOPHEN 500 MG
1 TABLET ORAL
Refills: 0 | DISCHARGE

## 2023-12-21 RX ORDER — ASPIRIN/CALCIUM CARB/MAGNESIUM 324 MG
1 TABLET ORAL
Qty: 90 | Refills: 6
Start: 2023-12-21 | End: 2025-09-10

## 2023-12-21 RX ORDER — TELMISARTAN 20 MG/1
1 TABLET ORAL
Refills: 0 | DISCHARGE

## 2023-12-21 RX ADMIN — HEPARIN SODIUM 5000 UNIT(S): 5000 INJECTION INTRAVENOUS; SUBCUTANEOUS at 17:19

## 2023-12-21 RX ADMIN — GABAPENTIN 100 MILLIGRAM(S): 400 CAPSULE ORAL at 05:35

## 2023-12-21 RX ADMIN — Medication 0: at 17:19

## 2023-12-21 RX ADMIN — Medication 650 MILLIGRAM(S): at 05:35

## 2023-12-21 RX ADMIN — PANTOPRAZOLE SODIUM 40 MILLIGRAM(S): 20 TABLET, DELAYED RELEASE ORAL at 05:35

## 2023-12-21 RX ADMIN — Medication 1 TABLET(S): at 17:19

## 2023-12-21 RX ADMIN — GABAPENTIN 100 MILLIGRAM(S): 400 CAPSULE ORAL at 13:45

## 2023-12-21 RX ADMIN — Medication 81 MILLIGRAM(S): at 11:38

## 2023-12-21 RX ADMIN — GABAPENTIN 300 MILLIGRAM(S): 400 CAPSULE ORAL at 11:39

## 2023-12-21 RX ADMIN — CHLORHEXIDINE GLUCONATE 1 APPLICATION(S): 213 SOLUTION TOPICAL at 11:46

## 2023-12-21 RX ADMIN — HEPARIN SODIUM 5000 UNIT(S): 5000 INJECTION INTRAVENOUS; SUBCUTANEOUS at 05:35

## 2023-12-21 RX ADMIN — Medication 1 TABLET(S): at 05:35

## 2023-12-21 RX ADMIN — DULOXETINE HYDROCHLORIDE 30 MILLIGRAM(S): 30 CAPSULE, DELAYED RELEASE ORAL at 11:39

## 2023-12-21 RX ADMIN — CLOPIDOGREL BISULFATE 75 MILLIGRAM(S): 75 TABLET, FILM COATED ORAL at 11:38

## 2023-12-21 NOTE — PROGRESS NOTE ADULT - ASSESSMENT
· Assessment	  Assessment	  72 yo F with PMHx of DM, HTN, HLD, CKD, Polyneuropathy, Depression, GERD presented from [facility] for left facial, left hemiparesis, dysarthria, aphasia and neglect on the left. CTH showed no acute findings, chronic ischemic strokes. CTA Short segment occlusion of a right MCA posterior mid M2 branch. Evidence for corresponding ischemia in the right posterior frontal/parietal lobes. CTP shows a 24 cc penumbra. Pt was a code NI alert and was deemed not a candidate as MRS 3. She also tested positive for COVID. Admitted to CEU currently, likely discharged today/tomorrow pending delivery of home supplies as well as MCOT device to home.     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  - CTA H&N and CTP: above  - C/w Aspirin 81mg  - C/w Plavix   - C/w Atorvastatin 80mg daily  - MRI Brain Non-con: above   - TTE: results above   - MCOT will be delivered to house   - HbA1c 5.4, TSH and LDL 70  - Tele monitoring  -SBP: 120-160  - PT recommended home rehab   - Fall and Aspiration precautions  - Tele monitoring  - Neurochecks q4 hrs  - Provide stroke education    #UTI: Treated   - UA positive  - f.u urine culture  - start Bactrim for 3 days as pt is allergic to Penicillin, completed course on 12/19     #Elevated trops  - likely due to type 2 MI damand ischemia  - no need to trend trops    #DM  - insulin SS for now  - monitor FS    #HTN  - hold HTN meds for now  - permissive HTN    #Polyneuropathy  - c/w gabapentin    #Depression  - c/w cymbalta    #CKD  - monitor cr  - elevated trops, no need to trend now    #COVID pos  - monitor for now    #GERD  - c/w pantoprazole    #Misc:  - DVT Prophylaxis: heparin  - Diet: regular diet as per speech  - GI Prophylaxis: pantoprazole  - Code status: Full code  - Dispo: 2A CEU       Pending:  Pt will be d/c pts daughters house pending delivery of medical supplies. Likely today or tomorrow.  · Assessment	  Assessment	  72 yo F with PMHx of DM, HTN, HLD, CKD, Polyneuropathy, Depression, GERD presented from [facility] for left facial, left hemiparesis, dysarthria, aphasia and neglect on the left. CTH showed no acute findings, chronic ischemic strokes. CTA Short segment occlusion of a right MCA posterior mid M2 branch. Evidence for corresponding ischemia in the right posterior frontal/parietal lobes. CTP shows a 24 cc penumbra. Pt was a code NI alert and was deemed not a candidate as MRS 3. She also tested positive for COVID. Admitted to CEU currently, likely discharged today/tomorrow pending delivery of home supplies as well as MCOT device to home.     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  - CTA H&N and CTP: above  - C/w Aspirin 81mg  - C/w Plavix   - C/w Atorvastatin 80mg daily  - MRI Brain Non-con: above   - TTE: results above   - MCOT will be delivered to house   - HbA1c 5.4, TSH and LDL 70  - Tele monitoring  -SBP: 120-160  - PT recommended home rehab   - Fall and Aspiration precautions  - Tele monitoring  - Neurochecks q4 hrs  - Provide stroke education    #UTI: Treated   - UA positive  - f.u urine culture  - start Bactrim for 3 days as pt is allergic to Penicillin, completed course on 12/19     #Elevated trops  - likely due to type 2 MI damand ischemia  - no need to trend trops    #DM  - insulin SS for now  - monitor FS    #HTN  - hold HTN meds for now  - permissive HTN    #Polyneuropathy  - c/w gabapentin    #Depression  - c/w cymbalta    #CKD  - monitor cr  - elevated trops, no need to trend now    #COVID pos  - monitor for now    #GERD  - c/w pantoprazole    #Misc:  - DVT Prophylaxis: heparin  - Diet: regular diet as per speech  - GI Prophylaxis: pantoprazole  - Code status: Full code  - Dispo: 2A CEU       Pending:  Pt will be d/c pts daughters house pending delivery of medical supplies. Likely today or tomorrow.  · Assessment	  Assessment	  72 yo F with PMHx of DM, HTN, HLD, CKD, Polyneuropathy, Depression, GERD presented from [facility] for left facial, left hemiparesis, dysarthria, aphasia and neglect on the left. CTH showed no acute findings, chronic ischemic strokes. CTA Short segment occlusion of a right MCA posterior mid M2 branch. Evidence for corresponding ischemia in the right posterior frontal/parietal lobes. CTP shows a 24 cc penumbra. Pt was a code NI alert and was deemed not a candidate as MRS 3. She also tested positive for COVID. Admitted to CEU currently, likely discharged todaypending delivery of home supplies as well as MCOT device to home.     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  - CTA H&N and CTP: above  - C/w Aspirin 81mg  - C/w Plavix   - C/w Atorvastatin 80mg daily  - MRI Brain Non-con: above   - TTE: results above   - MCOT will be delivered to house   - HbA1c 5.4, TSH and LDL 70  - Tele monitoring  -SBP: 120-160  - PT recommended home rehab   - Fall and Aspiration precautions  - Tele monitoring  - Neurochecks q4 hrs  - Provide stroke education    #UTI: Treated   - UA positive  - f.u urine culture  - start Bactrim for 3 days as pt is allergic to Penicillin, completed course on 12/19     #Elevated trops  - likely due to type 2 MI damand ischemia  - no need to trend trops    #DM  - insulin SS for now  - monitor FS    #HTN  - hold HTN meds for now  - permissive HTN    #Polyneuropathy  - c/w gabapentin    #Depression  - c/w cymbalta    #CKD  - monitor cr  - elevated trops, no need to trend now    #COVID pos  - monitor for now    #GERD  - c/w pantoprazole    #Misc:  - DVT Prophylaxis: heparin  - Diet: regular diet as per speech  - GI Prophylaxis: pantoprazole  - Code status: Full code  - Dispo: 2A CEU       Pending:  Pt will be d/c pts daughters house pending delivery of medical supplies. Likely today or tomorrow.  · Assessment	  Assessment	  72 yo F with PMHx of DM, HTN, HLD, CKD, Polyneuropathy, Depression, GERD presented from [facility] for left facial, left hemiparesis, dysarthria, aphasia and neglect on the left. CTH showed no acute findings, chronic ischemic strokes. CTA Short segment occlusion of a right MCA posterior mid M2 branch. Evidence for corresponding ischemia in the right posterior frontal/parietal lobes. CTP shows a 24 cc penumbra. Pt was a code NI alert and was deemed not a candidate as MRS 3. She also tested positive for COVID. Admitted to CEU currently, likely discharged todaypending delivery of home supplies as well as MCOT device to home.     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  - CTA H&N and CTP: above  - C/w Aspirin 81mg  - C/w Plavix   - C/w Atorvastatin 80mg daily  - MRI Brain Non-con: above   - TTE: results above   - MCOT will be delivered to house   - HbA1c 5.4, TSH and LDL 70  - Tele monitoring  -SBP: 120-160  - PT recommended home rehab   - Fall and Aspiration precautions  - Tele monitoring  - Neurochecks q4 hrs  - Provide stroke education    #UTI: Treated   - UA positive  - f.u urine culture  - start Bactrim for 3 days as pt is allergic to Penicillin, completed course on 12/19     #Elevated trops  - likely due to type 2 MI damand ischemia  - no need to trend trops    #DM  - insulin SS for now  - monitor FS    #HTN  - hold HTN meds for now  - permissive HTN    #Polyneuropathy  - c/w gabapentin    #Depression  - c/w cymbalta    #CKD  - monitor cr  - elevated trops, no need to trend now    #COVID pos  - monitor for now    #GERD  - c/w pantoprazole    #Misc:  - DVT Prophylaxis: heparin  - Diet: regular diet as per speech  - GI Prophylaxis: pantoprazole  - Code status: Full code  - Dispo: 2A CEU       Pending:  Pt will be d/c pts daughters house pending delivery of medical supplies. Likely today or tomorrow.  · Assessment	  Assessment	  74 yo F with PMHx of DM, HTN, HLD, CKD, Polyneuropathy, Depression, GERD presented from [facility] for left facial, left hemiparesis, dysarthria, aphasia and neglect on the left. CTH showed no acute findings, chronic ischemic strokes. CTA Short segment occlusion of a right MCA posterior mid M2 branch. Evidence for corresponding ischemia in the right posterior frontal/parietal lobes. CTP shows a 24 cc penumbra. Pt was a code NI alert and was deemed not a candidate as MRS 3. She also tested positive for COVID. Admitted to CEU currently, likely discharged today pending delivery of home supplies as well as MCOT device to home.     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  - CTA H&N and CTP: above  - C/w Aspirin 81mg  - C/w Plavix   - C/w Atorvastatin 80mg daily  - MRI Brain Non-con: above   - TTE: results above   - MCOT will be delivered to house   - HbA1c 5.4, TSH and LDL 70  - Tele monitoring  -SBP: 120-160  - PT recommended home rehab   - Fall and Aspiration precautions  - Tele monitoring  - Neurochecks q4 hrs  - Provide stroke education    #UTI: Treated   - UA positive  - f.u urine culture  - start Bactrim for 3 days as pt is allergic to Penicillin, completed course on 12/19     #Elevated trops  - likely due to type 2 MI demand ischemia  - no need to trend trops    #DM  - insulin SS for now  - monitor FS    #HTN  - hold HTN meds for now  - permissive HTN    #Polyneuropathy  - c/w gabapentin    #Depression  - c/w cymbalta    #CKD  - monitor cr  - elevated trops, no need to trend now    #COVID pos  - monitor for now    #GERD  - c/w pantoprazole    #Misc:  - DVT Prophylaxis: heparin  - Diet: regular diet as per speech  - GI Prophylaxis: pantoprazole  - Code status: Full code  - Dispo: 2A CEU       Pending:  Pt will be d/c pts daughters house pending delivery of medical supplies. Likely today or tomorrow.  · Assessment	  Assessment	  74 yo F with PMHx of DM, HTN, HLD, CKD, Polyneuropathy, Depression, GERD presented from [facility] for left facial, left hemiparesis, dysarthria, aphasia and neglect on the left. CTH showed no acute findings, chronic ischemic strokes. CTA Short segment occlusion of a right MCA posterior mid M2 branch. Evidence for corresponding ischemia in the right posterior frontal/parietal lobes. CTP shows a 24 cc penumbra. Pt was a code NI alert and was deemed not a candidate as MRS 3. She also tested positive for COVID. Admitted to CEU currently, likely discharged today pending delivery of home supplies as well as MCOT device to home.     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  - CTA H&N and CTP: above  - C/w Aspirin 81mg  - C/w Plavix   - C/w Atorvastatin 80mg daily  - MRI Brain Non-con: above   - TTE: results above   - MCOT will be delivered to house   - HbA1c 5.4, TSH and LDL 70  - Tele monitoring  -SBP: 120-160  - PT recommended home rehab   - Fall and Aspiration precautions  - Tele monitoring  - Neurochecks q4 hrs  - Provide stroke education    #UTI: Treated   - UA positive  - f.u urine culture  - start Bactrim for 3 days as pt is allergic to Penicillin, completed course on 12/19     #Elevated trops  - likely due to type 2 MI demand ischemia  - no need to trend trops    #DM  - insulin SS for now  - monitor FS    #HTN  - hold HTN meds for now  - permissive HTN    #Polyneuropathy  - c/w gabapentin    #Depression  - c/w cymbalta    #CKD  - monitor cr  - elevated trops, no need to trend now    #COVID pos  - monitor for now    #GERD  - c/w pantoprazole    #Misc:  - DVT Prophylaxis: heparin  - Diet: regular diet as per speech  - GI Prophylaxis: pantoprazole  - Code status: Full code  - Dispo: 2A CEU       Pending:  Pt will be d/c pts daughters house pending delivery of medical supplies. Likely today or tomorrow.  · Assessment	  Assessment	  72 yo F with PMHx of DM, HTN, HLD, CKD, Polyneuropathy, Depression, GERD presented from [facility] for left facial, left hemiparesis, dysarthria, aphasia and neglect on the left. CTH showed no acute findings, chronic ischemic strokes. CTA Short segment occlusion of a right MCA posterior mid M2 branch. Evidence for corresponding ischemia in the right posterior frontal/parietal lobes. CTP shows a 24 cc penumbra. Pt was a code NI alert and was deemed not a candidate as MRS 3. She also tested positive for COVID. Admitted to CEU currently, likely discharged today pending delivery of home supplies as well as MCOT device to home.     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  - CTA H&N and CTP: above  - C/w Aspirin 81mg  - C/w Plavix   - C/w Atorvastatin 80mg daily  - MRI Brain Non-con: above   - TTE: results above   - MCOT will be delivered to house   - HbA1c 5.4, TSH and LDL 70  - Tele monitoring  -SBP: 120-160  - PT recommended home rehab   - Tele monitoring  - Neurochecks q4 hrs  - Provide stroke education    #UTI: Treated   - UA positive  - f.u urine culture  - Bactrim for 3 days as pt is allergic to Penicillin, completed course on 12/19     #Elevated trops  - likely due to type 2 MI demand ischemia  - no need to trend trops    #DM  - insulin SS for now  - monitor FS    #HTN  - hold HTN meds for now  - permissive HTN    #Polyneuropathy  - c/w gabapentin    #Depression  - c/w cymbalta    #CKD  - monitor cr  - elevated trops, no need to trend now    #COVID pos  - monitor for now    #GERD  - c/w pantoprazole    #Misc:  - DVT Prophylaxis: heparin  - Diet: regular diet as per speech  - GI Prophylaxis: pantoprazole  - Code status: Full code  - Dispo: 2A CEU       Pending:  Pt will be d/c pts daughters house pending delivery of medical supplies. Likely today or tomorrow.  · Assessment	  Assessment	  72 yo F with PMHx of DM, HTN, HLD, CKD, Polyneuropathy, Depression, GERD presented from [facility] for left facial, left hemiparesis, dysarthria, aphasia and neglect on the left. CTH showed no acute findings, chronic ischemic strokes. CTA Short segment occlusion of a right MCA posterior mid M2 branch. Evidence for corresponding ischemia in the right posterior frontal/parietal lobes. CTP shows a 24 cc penumbra. Pt was a code NI alert and was deemed not a candidate as MRS 3. She also tested positive for COVID. Admitted to CEU currently, likely discharged today pending delivery of home supplies as well as MCOT device to home.     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  - CTA H&N and CTP: above  - C/w Aspirin 81mg  - C/w Plavix   - C/w Atorvastatin 80mg daily  - MRI Brain Non-con: above   - TTE: results above   - MCOT will be delivered to house   - HbA1c 5.4, TSH and LDL 70  - Tele monitoring  -SBP: 120-160  - PT recommended home rehab   - Tele monitoring  - Neurochecks q4 hrs  - Provide stroke education    #UTI: Treated   - UA positive  - f.u urine culture  - Bactrim for 3 days as pt is allergic to Penicillin, completed course on 12/19     #Elevated trops  - likely due to type 2 MI demand ischemia  - no need to trend trops    #DM  - insulin SS for now  - monitor FS    #HTN  - hold HTN meds for now  - permissive HTN    #Polyneuropathy  - c/w gabapentin    #Depression  - c/w cymbalta    #CKD  - monitor cr  - elevated trops, no need to trend now    #COVID pos  - monitor for now    #GERD  - c/w pantoprazole    #Misc:  - DVT Prophylaxis: heparin  - Diet: regular diet as per speech  - GI Prophylaxis: pantoprazole  - Code status: Full code  - Dispo: 2A CEU       Pending:  Pt will be d/c pts daughters house pending delivery of medical supplies. Likely today or tomorrow.

## 2023-12-21 NOTE — PROGRESS NOTE ADULT - SUBJECTIVE AND OBJECTIVE BOX
72 yo F with PMHx of DM, HTN, HLD, CKD, Polyneuropathy, Depression, GERD presented from [facility] for left facial, left hemiparesis, dysarthria, aphasia and neglect on the left. CTH showed no acute findings, chronic ischemic strokes. CTA Short segment occlusion of a right MCA posterior mid M2 branch. Evidence for corresponding ischemia in the right posterior frontal/parietal lobes. CTP shows a 24 cc penumbra. Pt was a code NI alert and was deemed not a candidate as MRS 3. She also tested positive for COVID. Admitted to [facility] currently, likely discharged tomorrow pending delivery of home supplies as well as MCOT device to home.  Today pt is comfortable, has no specific complaints, was able to participate with PT.      INTERVAL HPI/OVERNIGHT EVENTS:    Patient seen and examined. No acute events overnight. Overall unchanged         Vital Signs:  T(C): 36.7 (21 Dec 2023 11:41), Max: 36.7 (21 Dec 2023 11:41)  T(F): 98 (21 Dec 2023 11:41), Max: 98 (21 Dec 2023 11:41)  HR: 80 (21 Dec 2023 11:41) (60 - 81)  BP: 135/41 (21 Dec 2023 11:41) (129/69 - 167/74)  BP(mean): 102 (20 Dec 2023 20:00) (99 - 102)  RR: 20 (21 Dec 2023 11:41) (20 - 20)  SpO2: 100% (21 Dec 2023 11:41) (95% - 100%)    Parameters below as of 20 Dec 2023 20:00  Patient On (Oxygen Delivery Method): room air            PHYSICAL EXAM:    GENERAL:  NAD  SKIN: No rashes or lesions  HEENT: Atraumatic. Normocephalic.   NECK: Supple, No JVD. No lymphadenopathy.  PULMONARY: CTA B/L. No wheezing. No rales  CVS: Normal S1, S2. Rate and Rhythm are regular.   ABDOMEN/GI: Soft, Nontender, Nondistended  MSK:  No clubbing or cyanosis   NEUROLOGIC: moves all extremities Neurologic:  Mental status: awake, alert, oriented to place. Pt unable to correctly answer questions in regards to age and time. Speech is fluent, able to name objects. Follows commands. Attention/concentration intact. No dysarthria, no aphasia.  Cranial nerves:   II: visual fields are full to confrontation. pupils equally round and reactive to light,   III, IV, VI: EOMI without nystagmus  V:  V1-V3 sensation intact,   VII: no droop observed today, normal eye closure and smile  IX, X: Uvula is midline and soft palate rises symmetrically  XI: head turning and shoulder shrug are intact.  XII: tongue midline  Motor: Normal bulk and tone, strength 5/5 in b/l UE and LE,  strength 5/5. No pronator drift  Sensation: intact to light touch. No neglect.  Coordination: No dysmetria on finger-to-nose and heel-to-shin  Reflexes: downgoing toes bilaterally  Gait: not assessed.     NIH SS: 2      Consultant(s) Notes Reviewed:  [x ] YES  [ ] NO  Care Discussed with Consultants/Other Providers [ x] YES  [ ] NO    LABS:                             10.1   7.71  )-----------( 238      ( 21 Dec 2023 05:32 )             30.8   12-21    139  |  105  |  14  ----------------------------<  93  4.0   |  25  |  1.7<H>    Ca    9.2      21 Dec 2023 05:32  Mg     2.1     12-21    TPro  6.1  /  Alb  3.5  /  TBili  0.4  /  DBili  x   /  AST  24  /  ALT  13  /  AlkPhos  129<H>  12-21      RADIOLOGY & ADDITIONAL TESTS:    < from: MR Head No Cont (12.15.23 @ 17:00) >    IMPRESSION:    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 No hemorrhagic transformation.    Stable mild ventriculomegaly.    < end of copied text >  < from: CT Angio Neck Stroke Protocol w/ IV Cont (12.15.23 @ 11:48) >    IMPRESSION:    Short segment occlusion of a right MCA posterior mid M2 branch. Evidence   for corresponding ischemia in the right posterior frontal/parietal lobes:   Tmax >6s vol 29 cc, CBF <30% vol 5 cc; mismatch vol / penumbra 24 cc.    Spoke with Joyce Ortega on 12/15/2023 11:19 AM with readback.    < end of copied text >  < from: TTE Echo Complete w/o Contrast w/ Doppler (12.18.23 @ 10:49) >  Summary:   1. Left ventricular ejection fraction, by visual estimation, is 60 to   65%.   2. Normal left ventricular size and wall thicknesses, with normal   systolic and diastolic function.    < end of copied text >      Imaging or report Personally Reviewed:  [x] YES  [ ] NO  EKG reviewed: [x] YES  [ ] NO      MEDICATIONS  (STANDING):  acetaminophen     Tablet .. 650 milliGRAM(s) Oral every 12 hours  aspirin enteric coated 81 milliGRAM(s) Oral daily  atorvastatin 80 milliGRAM(s) Oral at bedtime  calcium carbonate 1250 mG  + Vitamin D (OsCal 500 + D) 1 Tablet(s) Oral two times a day  chlorhexidine 2% Cloths 1 Application(s) Topical daily  clopidogrel Tablet 75 milliGRAM(s) Oral daily  dextrose 5%. 1000 milliLiter(s) (50 mL/Hr) IV Continuous <Continuous>  dextrose 5%. 1000 milliLiter(s) (100 mL/Hr) IV Continuous <Continuous>  dextrose 50% Injectable 25 Gram(s) IV Push once  dextrose 50% Injectable 12.5 Gram(s) IV Push once  dextrose 50% Injectable 25 Gram(s) IV Push once  DULoxetine 30 milliGRAM(s) Oral daily  gabapentin 100 milliGRAM(s) Oral three times a day  gabapentin 300 milliGRAM(s) Oral daily  glucagon  Injectable 1 milliGRAM(s) IntraMuscular once  heparin   Injectable 5000 Unit(s) SubCutaneous every 12 hours  insulin lispro (ADMELOG) corrective regimen sliding scale   SubCutaneous three times a day before meals  melatonin 10 milliGRAM(s) Oral at bedtime  pantoprazole    Tablet 40 milliGRAM(s) Oral before breakfast  polyethylene glycol 3350 17 Gram(s) Oral daily  senna 2 Tablet(s) Oral at bedtime    MEDICATIONS  (PRN):  dextrose Oral Gel 15 Gram(s) Oral once PRN Blood Glucose LESS THAN 70 milliGRAM(s)/deciliter                 74 yo F with PMHx of DM, HTN, HLD, CKD, Polyneuropathy, Depression, GERD presented from [facility] for left facial, left hemiparesis, dysarthria, aphasia and neglect on the left. CTH showed no acute findings, chronic ischemic strokes. CTA Short segment occlusion of a right MCA posterior mid M2 branch. Evidence for corresponding ischemia in the right posterior frontal/parietal lobes. CTP shows a 24 cc penumbra. Pt was a code NI alert and was deemed not a candidate as MRS 3. She also tested positive for COVID. Admitted to [facility] currently, likely discharged tomorrow pending delivery of home supplies as well as MCOT device to home.  Today pt is comfortable, has no specific complaints, was able to participate with PT.      INTERVAL HPI/OVERNIGHT EVENTS:    Patient seen and examined. No acute events overnight. Overall unchanged         Vital Signs:  T(C): 36.7 (21 Dec 2023 11:41), Max: 36.7 (21 Dec 2023 11:41)  T(F): 98 (21 Dec 2023 11:41), Max: 98 (21 Dec 2023 11:41)  HR: 80 (21 Dec 2023 11:41) (60 - 81)  BP: 135/41 (21 Dec 2023 11:41) (129/69 - 167/74)  BP(mean): 102 (20 Dec 2023 20:00) (99 - 102)  RR: 20 (21 Dec 2023 11:41) (20 - 20)  SpO2: 100% (21 Dec 2023 11:41) (95% - 100%)    Parameters below as of 20 Dec 2023 20:00  Patient On (Oxygen Delivery Method): room air            PHYSICAL EXAM:    GENERAL:  NAD  SKIN: No rashes or lesions  HEENT: Atraumatic. Normocephalic.   NECK: Supple, No JVD. No lymphadenopathy.  PULMONARY: CTA B/L. No wheezing. No rales  CVS: Normal S1, S2. Rate and Rhythm are regular.   ABDOMEN/GI: Soft, Nontender, Nondistended  MSK:  No clubbing or cyanosis   NEUROLOGIC: moves all extremities Neurologic:  Mental status: awake, alert, oriented to place. Pt unable to correctly answer questions in regards to age and time. Speech is fluent, able to name objects. Follows commands. Attention/concentration intact. No dysarthria, no aphasia.  Cranial nerves:   II: visual fields are full to confrontation. pupils equally round and reactive to light,   III, IV, VI: EOMI without nystagmus  V:  V1-V3 sensation intact,   VII: no droop observed today, normal eye closure and smile  IX, X: Uvula is midline and soft palate rises symmetrically  XI: head turning and shoulder shrug are intact.  XII: tongue midline  Motor: Normal bulk and tone, strength 5/5 in b/l UE and LE,  strength 5/5. No pronator drift  Sensation: intact to light touch. No neglect.  Coordination: No dysmetria on finger-to-nose and heel-to-shin  Reflexes: downgoing toes bilaterally  Gait: not assessed.     NIH SS: 2      Consultant(s) Notes Reviewed:  [x ] YES  [ ] NO  Care Discussed with Consultants/Other Providers [ x] YES  [ ] NO    LABS:                             10.1   7.71  )-----------( 238      ( 21 Dec 2023 05:32 )             30.8   12-21    139  |  105  |  14  ----------------------------<  93  4.0   |  25  |  1.7<H>    Ca    9.2      21 Dec 2023 05:32  Mg     2.1     12-21    TPro  6.1  /  Alb  3.5  /  TBili  0.4  /  DBili  x   /  AST  24  /  ALT  13  /  AlkPhos  129<H>  12-21      RADIOLOGY & ADDITIONAL TESTS:    < from: MR Head No Cont (12.15.23 @ 17:00) >    IMPRESSION:    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 No hemorrhagic transformation.    Stable mild ventriculomegaly.    < end of copied text >  < from: CT Angio Neck Stroke Protocol w/ IV Cont (12.15.23 @ 11:48) >    IMPRESSION:    Short segment occlusion of a right MCA posterior mid M2 branch. Evidence   for corresponding ischemia in the right posterior frontal/parietal lobes:   Tmax >6s vol 29 cc, CBF <30% vol 5 cc; mismatch vol / penumbra 24 cc.    Spoke with Joyce Ortega on 12/15/2023 11:19 AM with readback.    < end of copied text >  < from: TTE Echo Complete w/o Contrast w/ Doppler (12.18.23 @ 10:49) >  Summary:   1. Left ventricular ejection fraction, by visual estimation, is 60 to   65%.   2. Normal left ventricular size and wall thicknesses, with normal   systolic and diastolic function.    < end of copied text >      Imaging or report Personally Reviewed:  [x] YES  [ ] NO  EKG reviewed: [x] YES  [ ] NO      MEDICATIONS  (STANDING):  acetaminophen     Tablet .. 650 milliGRAM(s) Oral every 12 hours  aspirin enteric coated 81 milliGRAM(s) Oral daily  atorvastatin 80 milliGRAM(s) Oral at bedtime  calcium carbonate 1250 mG  + Vitamin D (OsCal 500 + D) 1 Tablet(s) Oral two times a day  chlorhexidine 2% Cloths 1 Application(s) Topical daily  clopidogrel Tablet 75 milliGRAM(s) Oral daily  dextrose 5%. 1000 milliLiter(s) (50 mL/Hr) IV Continuous <Continuous>  dextrose 5%. 1000 milliLiter(s) (100 mL/Hr) IV Continuous <Continuous>  dextrose 50% Injectable 25 Gram(s) IV Push once  dextrose 50% Injectable 12.5 Gram(s) IV Push once  dextrose 50% Injectable 25 Gram(s) IV Push once  DULoxetine 30 milliGRAM(s) Oral daily  gabapentin 100 milliGRAM(s) Oral three times a day  gabapentin 300 milliGRAM(s) Oral daily  glucagon  Injectable 1 milliGRAM(s) IntraMuscular once  heparin   Injectable 5000 Unit(s) SubCutaneous every 12 hours  insulin lispro (ADMELOG) corrective regimen sliding scale   SubCutaneous three times a day before meals  melatonin 10 milliGRAM(s) Oral at bedtime  pantoprazole    Tablet 40 milliGRAM(s) Oral before breakfast  polyethylene glycol 3350 17 Gram(s) Oral daily  senna 2 Tablet(s) Oral at bedtime    MEDICATIONS  (PRN):  dextrose Oral Gel 15 Gram(s) Oral once PRN Blood Glucose LESS THAN 70 milliGRAM(s)/deciliter

## 2023-12-21 NOTE — PROGRESS NOTE ADULT - SUBJECTIVE AND OBJECTIVE BOX
Neurology Stroke Progress Note    INTERVAL HPI/OVERNIGHT EVENTS:  Patient seen and examined. Today, pts mental status is unchanged since yesterday, remains slightly confused. No Overnight events. Awaiting for equipments like bed, wheelchair, commode to be delivered at home, d/c today.     MEDICATIONS  (STANDING):  acetaminophen     Tablet .. 650 milliGRAM(s) Oral every 12 hours  aspirin enteric coated 81 milliGRAM(s) Oral daily  atorvastatin 80 milliGRAM(s) Oral at bedtime  calcium carbonate 1250 mG  + Vitamin D (OsCal 500 + D) 1 Tablet(s) Oral two times a day  chlorhexidine 2% Cloths 1 Application(s) Topical daily  clopidogrel Tablet 75 milliGRAM(s) Oral daily  dextrose 5%. 1000 milliLiter(s) (50 mL/Hr) IV Continuous <Continuous>  dextrose 5%. 1000 milliLiter(s) (100 mL/Hr) IV Continuous <Continuous>  dextrose 50% Injectable 25 Gram(s) IV Push once  dextrose 50% Injectable 25 Gram(s) IV Push once  dextrose 50% Injectable 12.5 Gram(s) IV Push once  DULoxetine 30 milliGRAM(s) Oral daily  gabapentin 100 milliGRAM(s) Oral three times a day  gabapentin 300 milliGRAM(s) Oral daily  glucagon  Injectable 1 milliGRAM(s) IntraMuscular once  heparin   Injectable 5000 Unit(s) SubCutaneous every 12 hours  insulin lispro (ADMELOG) corrective regimen sliding scale   SubCutaneous three times a day before meals  melatonin 10 milliGRAM(s) Oral at bedtime  pantoprazole    Tablet 40 milliGRAM(s) Oral before breakfast  polyethylene glycol 3350 17 Gram(s) Oral daily  senna 2 Tablet(s) Oral at bedtime    MEDICATIONS  (PRN):  dextrose Oral Gel 15 Gram(s) Oral once PRN Blood Glucose LESS THAN 70 milliGRAM(s)/deciliter      Allergies    penicillin (Unknown)    Intolerances        ROS: As per HPI, otherwise negative    Vital Signs Last 24 Hrs  T(C): 36.6 (21 Dec 2023 08:02), Max: 36.6 (21 Dec 2023 08:02)  T(F): 97.9 (21 Dec 2023 08:02), Max: 97.9 (21 Dec 2023 08:02)  HR: 80 (21 Dec 2023 08:02) (60 - 81)  BP: 132/58 (21 Dec 2023 08:02) (129/69 - 167/74)  BP(mean): 102 (20 Dec 2023 20:00) (99 - 105)  RR: 20 (21 Dec 2023 08:02) (20 - 20)  SpO2: 100% (21 Dec 2023 08:02) (92% - 100%)    Parameters below as of 20 Dec 2023 20:00  Patient On (Oxygen Delivery Method): room air    General: awake and alert, in no acute distress.     Neurologic:  Mental status: awake, alert, oriented to place. Pt unable to correctly answer questions in regards to age and time. Speech is fluent, able to name objects. Follows commands. Attention/concentration intact. No dysarthria, no aphasia.  Cranial nerves:   II: visual fields are full to confrontation. pupils equally round and reactive to light,   III, IV, VI: EOMI without nystagmus  V:  V1-V3 sensation intact,   VII: no droop observed today, normal eye closure and smile  IX, X: Uvula is midline and soft palate rises symmetrically  XI: head turning and shoulder shrug are intact.  XII: tongue midline  Motor: Normal bulk and tone, strength 5/5 in b/l UE and LE,  strength 5/5. No pronator drift  Sensation: intact to light touch. No neglect.  Coordination: No dysmetria on finger-to-nose and heel-to-shin  Reflexes: downgoing toes bilaterally  Gait: not assessed.     NIH SS: 2     LABS:                        10.1   7.71  )-----------( 238      ( 21 Dec 2023 05:32 )             30.8     12-21    139  |  105  |  14  ----------------------------<  93  4.0   |  25  |  1.7<H>    Ca    9.2      21 Dec 2023 05:32  Mg     2.1     12-21    TPro  6.1  /  Alb  3.5  /  TBili  0.4  /  DBili  x   /  AST  24  /  ALT  13  /  AlkPhos  129<H>  12-21      Urinalysis Basic - ( 21 Dec 2023 05:32 )    Color: x / Appearance: x / SG: x / pH: x  Gluc: 93 mg/dL / Ketone: x  / Bili: x / Urobili: x   Blood: x / Protein: x / Nitrite: x   Leuk Esterase: x / RBC: x / WBC x   Sq Epi: x / Non Sq Epi: x / Bacteria: x        RADIOLOGY & ADDITIONAL TESTS:    < from: MR Head No Cont (12.15.23 @ 17:00) >  IMPRESSION:    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 No hemorrhagic transformation.      < from: TTE Echo Complete w/o Contrast w/ Doppler (12.18.23 @ 10:49) >  ummary:   1. Left ventricular ejection fraction, by visual estimation, is 60 to   65%.   2. Normal left ventricular size and wall thicknesses, with normal   systolic and diastolic function.    PHYSICIAN INTERPRETATION:  Left Ventricle: Normal left ventricular size and wallthicknesses, with   normal systolic and diastolic function. Left ventricular ejection   fraction, by visual estimation, is 60 to 65%.  Right Ventricle: Normal right ventricular size and function.  Left Atrium: Normal left atrial size.  Right Atrium: Normal right atrial size.  Pericardium: There is no evidence of pericardial effusion.  Mitral Valve: Grossly normal mitral valve with normal leaflet excursion.   No evidence of mitral stenosis. Trivial mitral regurgitation.  Tricuspid Valve: The tricuspid valve is not well visualized. No tricuspid   regurgitation visualized.  Aortic Valve: Trileaflet aortic valve with normal opening. No evidence of   aortic stenosis. No aortic regurgitation.  Pulmonic Valve: No pulmonic regurgitation.  Aorta: The aortic root and ascending aorta are normal in size.    < from: CT Angio Neck Stroke Protocol w/ IV Cont (12.15.23 @ 11:48) >  IMPRESSION:    Short segment occlusion of a right MCA posterior mid M2 branch. Evidence   for corresponding ischemia in the right posterior frontal/parietal lobes:   Tmax >6s vol 29 cc, CBF <30% vol 5 cc; mismatch vol / penumbra 24 cc.    < end of copied text >     Neurology Stroke Progress Note    INTERVAL HPI/OVERNIGHT EVENTS:  Patient seen and examined. Today, pts mental status is unchanged since yesterday, remains slightly confused. No Overnight events. Awaiting for equipments like bed, wheelchair, commode to be delivered at home, d/c today/tomorrow pending supplies delivery to house.     MEDICATIONS  (STANDING):  acetaminophen     Tablet .. 650 milliGRAM(s) Oral every 12 hours  aspirin enteric coated 81 milliGRAM(s) Oral daily  atorvastatin 80 milliGRAM(s) Oral at bedtime  calcium carbonate 1250 mG  + Vitamin D (OsCal 500 + D) 1 Tablet(s) Oral two times a day  chlorhexidine 2% Cloths 1 Application(s) Topical daily  clopidogrel Tablet 75 milliGRAM(s) Oral daily  dextrose 5%. 1000 milliLiter(s) (50 mL/Hr) IV Continuous <Continuous>  dextrose 5%. 1000 milliLiter(s) (100 mL/Hr) IV Continuous <Continuous>  dextrose 50% Injectable 25 Gram(s) IV Push once  dextrose 50% Injectable 25 Gram(s) IV Push once  dextrose 50% Injectable 12.5 Gram(s) IV Push once  DULoxetine 30 milliGRAM(s) Oral daily  gabapentin 100 milliGRAM(s) Oral three times a day  gabapentin 300 milliGRAM(s) Oral daily  glucagon  Injectable 1 milliGRAM(s) IntraMuscular once  heparin   Injectable 5000 Unit(s) SubCutaneous every 12 hours  insulin lispro (ADMELOG) corrective regimen sliding scale   SubCutaneous three times a day before meals  melatonin 10 milliGRAM(s) Oral at bedtime  pantoprazole    Tablet 40 milliGRAM(s) Oral before breakfast  polyethylene glycol 3350 17 Gram(s) Oral daily  senna 2 Tablet(s) Oral at bedtime    MEDICATIONS  (PRN):  dextrose Oral Gel 15 Gram(s) Oral once PRN Blood Glucose LESS THAN 70 milliGRAM(s)/deciliter      Allergies    penicillin (Unknown)    Intolerances        ROS: As per HPI, otherwise negative    Vital Signs Last 24 Hrs  T(C): 36.6 (21 Dec 2023 08:02), Max: 36.6 (21 Dec 2023 08:02)  T(F): 97.9 (21 Dec 2023 08:02), Max: 97.9 (21 Dec 2023 08:02)  HR: 80 (21 Dec 2023 08:02) (60 - 81)  BP: 132/58 (21 Dec 2023 08:02) (129/69 - 167/74)  BP(mean): 102 (20 Dec 2023 20:00) (99 - 105)  RR: 20 (21 Dec 2023 08:02) (20 - 20)  SpO2: 100% (21 Dec 2023 08:02) (92% - 100%)    Parameters below as of 20 Dec 2023 20:00  Patient On (Oxygen Delivery Method): room air    General: awake and alert, in no acute distress.     Neurologic:  Mental status: awake, alert, oriented to place. Pt unable to correctly answer questions in regards to age and time. Speech is fluent, able to name objects. Follows commands. Attention/concentration intact. No dysarthria, no aphasia.  Cranial nerves:   II: visual fields are full to confrontation. pupils equally round and reactive to light,   III, IV, VI: EOMI without nystagmus  V:  V1-V3 sensation intact,   VII: no droop observed today, normal eye closure and smile  IX, X: Uvula is midline and soft palate rises symmetrically  XI: head turning and shoulder shrug are intact.  XII: tongue midline  Motor: Normal bulk and tone, strength 5/5 in b/l UE and LE,  strength 5/5. No pronator drift  Sensation: intact to light touch. No neglect.  Coordination: No dysmetria on finger-to-nose and heel-to-shin  Reflexes: downgoing toes bilaterally  Gait: not assessed.     NIHSS: 2 due to disorientation     LABS:                        10.1   7.71  )-----------( 238      ( 21 Dec 2023 05:32 )             30.8     12-21    139  |  105  |  14  ----------------------------<  93  4.0   |  25  |  1.7<H>    Ca    9.2      21 Dec 2023 05:32  Mg     2.1     12-21    TPro  6.1  /  Alb  3.5  /  TBili  0.4  /  DBili  x   /  AST  24  /  ALT  13  /  AlkPhos  129<H>  12-21      Urinalysis Basic - ( 21 Dec 2023 05:32 )    Color: x / Appearance: x / SG: x / pH: x  Gluc: 93 mg/dL / Ketone: x  / Bili: x / Urobili: x   Blood: x / Protein: x / Nitrite: x   Leuk Esterase: x / RBC: x / WBC x   Sq Epi: x / Non Sq Epi: x / Bacteria: x        RADIOLOGY & ADDITIONAL TESTS:    < from: MR Head No Cont (12.15.23 @ 17:00) >  IMPRESSION:    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 No hemorrhagic transformation.      < from: TTE Echo Complete w/o Contrast w/ Doppler (12.18.23 @ 10:49) >  ummary:   1. Left ventricular ejection fraction, by visual estimation, is 60 to   65%.   2. Normal left ventricular size and wall thicknesses, with normal   systolic and diastolic function.    PHYSICIAN INTERPRETATION:  Left Ventricle: Normal left ventricular size and wallthicknesses, with   normal systolic and diastolic function. Left ventricular ejection   fraction, by visual estimation, is 60 to 65%.  Right Ventricle: Normal right ventricular size and function.  Left Atrium: Normal left atrial size.  Right Atrium: Normal right atrial size.  Pericardium: There is no evidence of pericardial effusion.  Mitral Valve: Grossly normal mitral valve with normal leaflet excursion.   No evidence of mitral stenosis. Trivial mitral regurgitation.  Tricuspid Valve: The tricuspid valve is not well visualized. No tricuspid   regurgitation visualized.  Aortic Valve: Trileaflet aortic valve with normal opening. No evidence of   aortic stenosis. No aortic regurgitation.  Pulmonic Valve: No pulmonic regurgitation.  Aorta: The aortic root and ascending aorta are normal in size.    < from: CT Angio Neck Stroke Protocol w/ IV Cont (12.15.23 @ 11:48) >  IMPRESSION:    Short segment occlusion of a right MCA posterior mid M2 branch. Evidence   for corresponding ischemia in the right posterior frontal/parietal lobes:   Tmax >6s vol 29 cc, CBF <30% vol 5 cc; mismatch vol / penumbra 24 cc.    < end of copied text >     Neurology Stroke Progress Note    INTERVAL HPI/OVERNIGHT EVENTS:  Patient seen and examined. Today, pts mental status is unchanged since yesterday, remains slightly confused. No Overnight events. Awaiting for equipments like bed, wheelchair, commode to be delivered at home, d/c today pending supplies delivery to house.     MEDICATIONS  (STANDING):  acetaminophen     Tablet .. 650 milliGRAM(s) Oral every 12 hours  aspirin enteric coated 81 milliGRAM(s) Oral daily  atorvastatin 80 milliGRAM(s) Oral at bedtime  calcium carbonate 1250 mG  + Vitamin D (OsCal 500 + D) 1 Tablet(s) Oral two times a day  chlorhexidine 2% Cloths 1 Application(s) Topical daily  clopidogrel Tablet 75 milliGRAM(s) Oral daily  dextrose 5%. 1000 milliLiter(s) (50 mL/Hr) IV Continuous <Continuous>  dextrose 5%. 1000 milliLiter(s) (100 mL/Hr) IV Continuous <Continuous>  dextrose 50% Injectable 25 Gram(s) IV Push once  dextrose 50% Injectable 25 Gram(s) IV Push once  dextrose 50% Injectable 12.5 Gram(s) IV Push once  DULoxetine 30 milliGRAM(s) Oral daily  gabapentin 100 milliGRAM(s) Oral three times a day  gabapentin 300 milliGRAM(s) Oral daily  glucagon  Injectable 1 milliGRAM(s) IntraMuscular once  heparin   Injectable 5000 Unit(s) SubCutaneous every 12 hours  insulin lispro (ADMELOG) corrective regimen sliding scale   SubCutaneous three times a day before meals  melatonin 10 milliGRAM(s) Oral at bedtime  pantoprazole    Tablet 40 milliGRAM(s) Oral before breakfast  polyethylene glycol 3350 17 Gram(s) Oral daily  senna 2 Tablet(s) Oral at bedtime    MEDICATIONS  (PRN):  dextrose Oral Gel 15 Gram(s) Oral once PRN Blood Glucose LESS THAN 70 milliGRAM(s)/deciliter      Allergies    penicillin (Unknown)    Intolerances        ROS: As per HPI, otherwise negative    Vital Signs Last 24 Hrs  T(C): 36.6 (21 Dec 2023 08:02), Max: 36.6 (21 Dec 2023 08:02)  T(F): 97.9 (21 Dec 2023 08:02), Max: 97.9 (21 Dec 2023 08:02)  HR: 80 (21 Dec 2023 08:02) (60 - 81)  BP: 132/58 (21 Dec 2023 08:02) (129/69 - 167/74)  BP(mean): 102 (20 Dec 2023 20:00) (99 - 105)  RR: 20 (21 Dec 2023 08:02) (20 - 20)  SpO2: 100% (21 Dec 2023 08:02) (92% - 100%)    Parameters below as of 20 Dec 2023 20:00  Patient On (Oxygen Delivery Method): room air    General: awake and alert, in no acute distress.     Neurologic:  Mental status: awake, alert, oriented to place. Pt unable to correctly answer questions in regards to age and time. Speech is fluent, able to name objects. Follows commands. Attention/concentration intact. No dysarthria, no aphasia.  Cranial nerves:   II: visual fields are full to confrontation. pupils equally round and reactive to light,   III, IV, VI: EOMI without nystagmus  V:  V1-V3 sensation intact,   VII: no droop observed today, normal eye closure and smile  IX, X: Uvula is midline and soft palate rises symmetrically  XI: head turning and shoulder shrug are intact.  XII: tongue midline  Motor: Normal bulk and tone, strength 5/5 in b/l UE and LE,  strength 5/5. No pronator drift  Sensation: intact to light touch. No neglect.  Coordination: No dysmetria on finger-to-nose and heel-to-shin  Reflexes: downgoing toes bilaterally  Gait: not assessed.     NIHSS: 2 due to disorientation     LABS:                        10.1   7.71  )-----------( 238      ( 21 Dec 2023 05:32 )             30.8     12-21    139  |  105  |  14  ----------------------------<  93  4.0   |  25  |  1.7<H>    Ca    9.2      21 Dec 2023 05:32  Mg     2.1     12-21    TPro  6.1  /  Alb  3.5  /  TBili  0.4  /  DBili  x   /  AST  24  /  ALT  13  /  AlkPhos  129<H>  12-21      Urinalysis Basic - ( 21 Dec 2023 05:32 )    Color: x / Appearance: x / SG: x / pH: x  Gluc: 93 mg/dL / Ketone: x  / Bili: x / Urobili: x   Blood: x / Protein: x / Nitrite: x   Leuk Esterase: x / RBC: x / WBC x   Sq Epi: x / Non Sq Epi: x / Bacteria: x        RADIOLOGY & ADDITIONAL TESTS:    < from: MR Head No Cont (12.15.23 @ 17:00) >  IMPRESSION:    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 No hemorrhagic transformation.      < from: TTE Echo Complete w/o Contrast w/ Doppler (12.18.23 @ 10:49) >  ummary:   1. Left ventricular ejection fraction, by visual estimation, is 60 to   65%.   2. Normal left ventricular size and wall thicknesses, with normal   systolic and diastolic function.    PHYSICIAN INTERPRETATION:  Left Ventricle: Normal left ventricular size and wallthicknesses, with   normal systolic and diastolic function. Left ventricular ejection   fraction, by visual estimation, is 60 to 65%.  Right Ventricle: Normal right ventricular size and function.  Left Atrium: Normal left atrial size.  Right Atrium: Normal right atrial size.  Pericardium: There is no evidence of pericardial effusion.  Mitral Valve: Grossly normal mitral valve with normal leaflet excursion.   No evidence of mitral stenosis. Trivial mitral regurgitation.  Tricuspid Valve: The tricuspid valve is not well visualized. No tricuspid   regurgitation visualized.  Aortic Valve: Trileaflet aortic valve with normal opening. No evidence of   aortic stenosis. No aortic regurgitation.  Pulmonic Valve: No pulmonic regurgitation.  Aorta: The aortic root and ascending aorta are normal in size.    < from: CT Angio Neck Stroke Protocol w/ IV Cont (12.15.23 @ 11:48) >  IMPRESSION:    Short segment occlusion of a right MCA posterior mid M2 branch. Evidence   for corresponding ischemia in the right posterior frontal/parietal lobes:   Tmax >6s vol 29 cc, CBF <30% vol 5 cc; mismatch vol / penumbra 24 cc.    < end of copied text >

## 2023-12-21 NOTE — PROGRESS NOTE ADULT - NS ATTEND AMEND GEN_ALL_CORE FT
Pt is a 72 yo F with PMHx of DM, HTN, HLD, CKD, who presented from NH with left hemiparesis and encephalopathy (likely toxic metabolic due to covid infection plus acute stroke). Covid +    Impr: right insular acute ischemic stroke, inferior branch of R M2  Etiology: ESUS  Continue DAPT x 90 days then ASA monotherapy and high intensity statin  Appreciate EP recs. Given Covid + status, MCOT then ILR placement as outpt  PT/OT/ST, tele, -160, dispo planning- home tomorrow with outpt services. Pt is a 74 yo F with PMHx of DM, HTN, HLD, CKD, who presented from NH with left hemiparesis and encephalopathy (likely toxic metabolic due to covid infection plus acute stroke). Covid +    Impr: right insular acute ischemic stroke, inferior branch of R M2  Etiology: ESUS  Continue DAPT x 90 days then ASA monotherapy and high intensity statin  Appreciate EP recs. Given Covid + status, MCOT then ILR placement as outpt  PT/OT/ST, tele, -160, dispo planning- home tomorrow with outpt services.

## 2023-12-21 NOTE — PROGRESS NOTE ADULT - TIME BILLING
Review of imaging and chart; obtaining history; examination of pt; discussion and coordination of care.

## 2023-12-21 NOTE — PROGRESS NOTE ADULT - REASON FOR ADMISSION
left sided weakness

## 2023-12-21 NOTE — PROGRESS NOTE ADULT - PROVIDER SPECIALTY LIST ADULT
Neurology
Hospitalist
Neurology
Neurology
Internal Medicine
Hospitalist
Internal Medicine

## 2023-12-21 NOTE — PROGRESS NOTE ADULT - ASSESSMENT
73F PMHx of DM, HTN, HLD, CKD, Polyneuropathy, Depression, GERD presented from [facility] for left facial, left hemiparesis, dysarthria, aphasia and neglect on the left with unknown LKW CTH showed no acute findings, chronic ischemic strokes. CTA Short segment occlusion of a right MCA posterior mid M2 branch. Evidence for corresponding ischemia in the right posterior frontal/parietal lobes. CTP shows a 24 cc penumbra. Pt was a code NI alert and was deemed not a candidate as MRS 3. She also tested positive for COVID. Admitted to stroke unit for workup. MRI revealed acute R insular cortex, and R frontotemporal operculum stroke, with stable mild ventriculomegaly. currently being monitored in SDU    # Acute R insular cortex ischemic CVA  - s/p CTH/CTA H/N and MRI  - on asa and plavix, atorvastatin  - PT/OT/s/s  - management per primary team  - EP following for ILR, which can be done as OP if patient remains on isolation at the time of dc   - supportive care  - discharge home planned, pt is cleared and anticipated for today   - pt is awaiting for DME delivery     # COVID 19 infection   - chest xray unremarkable, no need for covid therapeutics at this time.   - encourage incentive spirometry  - pt is asymptomatic now   - comfortable on RA     # suspected UTI  - no fevers or leukocytosis  - no urine Cx is available  - pt received po Bactrim for 3 days   - likely asymptomatic bacteriuria, no indications for Abx     # Elevated trops  - trop 32-->40, per cardiology likely demand ischemia  - echo noted     # HTN   - off antihypertensives, -120s    # CKD 3/4   - , avoid nephrotoxic agents   - improving in last 24 hours   - maintain sufficient hydration     # GI/DVT prophylaxis    # dispo: pt is stable for discharge home with home care       Please call if any questions [phone]   73F PMHx of DM, HTN, HLD, CKD, Polyneuropathy, Depression, GERD presented from [facility] for left facial, left hemiparesis, dysarthria, aphasia and neglect on the left with unknown LKW CTH showed no acute findings, chronic ischemic strokes. CTA Short segment occlusion of a right MCA posterior mid M2 branch. Evidence for corresponding ischemia in the right posterior frontal/parietal lobes. CTP shows a 24 cc penumbra. Pt was a code NI alert and was deemed not a candidate as MRS 3. She also tested positive for COVID. Admitted to stroke unit for workup. MRI revealed acute R insular cortex, and R frontotemporal operculum stroke, with stable mild ventriculomegaly. currently being monitored in SDU    # Acute R insular cortex ischemic CVA  - s/p CTH/CTA H/N and MRI  - on asa and plavix, atorvastatin  - PT/OT/s/s  - management per primary team  - EP following for ILR, which can be done as OP if patient remains on isolation at the time of dc   - supportive care  - discharge home planned, pt is cleared and anticipated for today   - pt is awaiting for DME delivery     # COVID 19 infection   - chest xray unremarkable, no need for covid therapeutics at this time.   - encourage incentive spirometry  - pt is asymptomatic now   - comfortable on RA     # suspected UTI  - no fevers or leukocytosis  - no urine Cx is available  - pt received po Bactrim for 3 days   - likely asymptomatic bacteriuria, no indications for Abx     # Elevated trops  - trop 32-->40, per cardiology likely demand ischemia  - echo noted     # HTN   - off antihypertensives, -120s    # CKD 3/4   - , avoid nephrotoxic agents   - improving in last 24 hours   - maintain sufficient hydration     # GI/DVT prophylaxis    # dispo: pt is stable for discharge home with home care       Please call if any questions [phone]

## 2023-12-28 DIAGNOSIS — E11.42 TYPE 2 DIABETES MELLITUS WITH DIABETIC POLYNEUROPATHY: ICD-10-CM

## 2023-12-28 DIAGNOSIS — N18.30 CHRONIC KIDNEY DISEASE, STAGE 3 UNSPECIFIED: ICD-10-CM

## 2023-12-28 DIAGNOSIS — K21.9 GASTRO-ESOPHAGEAL REFLUX DISEASE WITHOUT ESOPHAGITIS: ICD-10-CM

## 2023-12-28 DIAGNOSIS — Z88.0 ALLERGY STATUS TO PENICILLIN: ICD-10-CM

## 2023-12-28 DIAGNOSIS — G92.8 OTHER TOXIC ENCEPHALOPATHY: ICD-10-CM

## 2023-12-28 DIAGNOSIS — I63.511 CEREBRAL INFARCTION DUE TO UNSPECIFIED OCCLUSION OR STENOSIS OF RIGHT MIDDLE CEREBRAL ARTERY: ICD-10-CM

## 2023-12-28 DIAGNOSIS — R29.708 NIHSS SCORE 8: ICD-10-CM

## 2023-12-28 DIAGNOSIS — R47.1 DYSARTHRIA AND ANARTHRIA: ICD-10-CM

## 2023-12-28 DIAGNOSIS — G81.94 HEMIPLEGIA, UNSPECIFIED AFFECTING LEFT NONDOMINANT SIDE: ICD-10-CM

## 2023-12-28 DIAGNOSIS — N39.0 URINARY TRACT INFECTION, SITE NOT SPECIFIED: ICD-10-CM

## 2023-12-28 DIAGNOSIS — E78.5 HYPERLIPIDEMIA, UNSPECIFIED: ICD-10-CM

## 2023-12-28 DIAGNOSIS — E11.22 TYPE 2 DIABETES MELLITUS WITH DIABETIC CHRONIC KIDNEY DISEASE: ICD-10-CM

## 2023-12-28 DIAGNOSIS — E83.42 HYPOMAGNESEMIA: ICD-10-CM

## 2023-12-28 DIAGNOSIS — R47.01 APHASIA: ICD-10-CM

## 2023-12-28 DIAGNOSIS — I21.A1 MYOCARDIAL INFARCTION TYPE 2: ICD-10-CM

## 2023-12-28 DIAGNOSIS — U07.1 COVID-19: ICD-10-CM

## 2023-12-28 DIAGNOSIS — F32.9 MAJOR DEPRESSIVE DISORDER, SINGLE EPISODE, UNSPECIFIED: ICD-10-CM

## 2023-12-28 DIAGNOSIS — I12.9 HYPERTENSIVE CHRONIC KIDNEY DISEASE WITH STAGE 1 THROUGH STAGE 4 CHRONIC KIDNEY DISEASE, OR UNSPECIFIED CHRONIC KIDNEY DISEASE: ICD-10-CM

## 2024-05-02 ENCOUNTER — APPOINTMENT (OUTPATIENT)
Dept: ELECTROPHYSIOLOGY | Facility: CLINIC | Age: 74
End: 2024-05-02

## 2024-05-30 ENCOUNTER — APPOINTMENT (OUTPATIENT)
Dept: ELECTROPHYSIOLOGY | Facility: CLINIC | Age: 74
End: 2024-05-30
Payer: MEDICARE

## 2024-05-30 ENCOUNTER — APPOINTMENT (OUTPATIENT)
Dept: ELECTROPHYSIOLOGY | Facility: CLINIC | Age: 74
End: 2024-05-30

## 2024-05-30 PROCEDURE — 99214 OFFICE O/P EST MOD 30 MIN: CPT

## 2024-05-31 NOTE — ADDENDUM
[FreeTextEntry1] : Lore MORALES assisted in documentation on 05/31/2024 acting as a scribe for Dr. Miriam Yang.

## 2024-05-31 NOTE — ASSESSMENT
[FreeTextEntry1] : ## Cryptogenic CVA   - MCOT reviewed with the son. No events of AF.  - Discussed long-term monitoring with ILR. Interested. They will discuss with siblings and let us know. - Follow-up again on Monday. If they are not interested, no further follow-up needed.  - Follow-up with Neuro/PCP.   - RTC after ILR or as needed.

## 2024-05-31 NOTE — HISTORY OF PRESENT ILLNESS
[FreeTextEntry1] : Ms. Brownlee is a 73-year-old female with PMHx of HTN, DM, DLD, CKD, CVA, GERD, is here for discussion of cryptogenic CVA.   MCOT did now show any AF. Also had COVID during that time, so ILR was not placed.    Recovering much better.  Denies chest pain, shortness of breath, palpitation, dizziness or LOC except noted above.  Echo (12/2023): Normal EF, normal LA size, normal RA size.

## 2024-10-18 NOTE — PATIENT PROFILE ADULT - MST SCORE
0

## 2024-11-13 ENCOUNTER — APPOINTMENT (OUTPATIENT)
Dept: ELECTROPHYSIOLOGY | Facility: CLINIC | Age: 74
End: 2024-11-13

## 2024-11-13 VITALS
HEIGHT: 60 IN | WEIGHT: 135 LBS | BODY MASS INDEX: 26.5 KG/M2 | HEART RATE: 121 BPM | SYSTOLIC BLOOD PRESSURE: 122 MMHG | DIASTOLIC BLOOD PRESSURE: 80 MMHG

## 2024-11-13 PROCEDURE — G2211 COMPLEX E/M VISIT ADD ON: CPT

## 2024-11-13 PROCEDURE — 99214 OFFICE O/P EST MOD 30 MIN: CPT

## 2024-11-13 PROCEDURE — 93000 ELECTROCARDIOGRAM COMPLETE: CPT

## 2024-11-27 NOTE — ED ADULT NURSE NOTE - NS ED NURSE REPORT GIVEN TO FT
I think the shortness of breath was from fluid accumulating in the lungs, but could also be from blocked vessels to the heart.    Would check a stress test to look for blocked vessels.    Your heart rate runs a bit low, not low enough to need a pacemaker but I would stop the Metoprolol completely    Poonam ARANA yes

## 2025-01-07 ENCOUNTER — OUTPATIENT (OUTPATIENT)
Dept: OUTPATIENT SERVICES | Facility: HOSPITAL | Age: 75
LOS: 1 days | Discharge: HOME | End: 2025-01-07
Payer: MEDICARE

## 2025-01-07 VITALS
RESPIRATION RATE: 21 BRPM | OXYGEN SATURATION: 98 % | DIASTOLIC BLOOD PRESSURE: 63 MMHG | SYSTOLIC BLOOD PRESSURE: 129 MMHG | HEART RATE: 94 BPM

## 2025-01-07 VITALS
RESPIRATION RATE: 20 BRPM | DIASTOLIC BLOOD PRESSURE: 75 MMHG | HEART RATE: 105 BPM | OXYGEN SATURATION: 98 % | SYSTOLIC BLOOD PRESSURE: 130 MMHG

## 2025-01-07 DIAGNOSIS — I63.9 CEREBRAL INFARCTION, UNSPECIFIED: ICD-10-CM

## 2025-01-07 PROCEDURE — 33285 INSJ SUBQ CAR RHYTHM MNTR: CPT

## 2025-01-07 PROCEDURE — C1764: CPT

## 2025-01-07 RX ORDER — SULFAMETHOXAZOLE/TRIMETHOPRIM 800-160 MG
1 TABLET ORAL ONCE
Refills: 0 | Status: COMPLETED | OUTPATIENT
Start: 2025-01-07 | End: 2025-01-07

## 2025-01-07 RX ADMIN — Medication 1 TABLET(S): at 10:22

## 2025-01-07 NOTE — H&P ADULT - ASSESSMENT
Ms. Brownlee is a 73-year-old female with PMHx of HTN, DM, DLD, CKD, CVA, GERD, is here for discussion of   cryptogenic CVA.   MCOT did now show any AF.now presents for ILR Implantation

## 2025-01-07 NOTE — CHART NOTE - NSCHARTNOTEFT_GEN_A_CORE
Electrophysiology Brief Post-Op Note    I have personally seen and examined the patient.  I agree with the history and physical which I have reviewed and noted any changes below.  01-07-25 @ 15:38    PRE-OP DIAGNOSIS: Cryptogenic CVA    POST-OP DIAGNOSIS: Cryptogenic CVA    PROCEDURE: Loop Implant    Physician: Dr. Yang  Assistant: RUPINDER Coles    ESTIMATED BLOOD LOSS:  2  mL    ANESTHESIA TYPE:  [  ]General Anesthesia  [  ] Sedation  [X  ] Local/Regional    CONDITION  [  ] Critical  [  ] Serious  [  ]Fair  [ X ]Good    SPECIMENS REMOVED (IF APPLICABLE):  none    IMPLANTS (IF APPLICABLE)  Loop Recorder (Inside) [id number]  R wave amplitude: 0.16mV    FINDINGS  PLAN OF CARE  - F/U 3-4 weeks  - May remove bandaid in 2 days  - May shower in 48 hours

## 2025-02-03 ENCOUNTER — APPOINTMENT (OUTPATIENT)
Dept: ELECTROPHYSIOLOGY | Facility: CLINIC | Age: 75
End: 2025-02-03

## 2025-03-10 ENCOUNTER — APPOINTMENT (OUTPATIENT)
Dept: CARDIOLOGY | Facility: CLINIC | Age: 75
End: 2025-03-10
Payer: MEDICARE

## 2025-03-10 ENCOUNTER — NON-APPOINTMENT (OUTPATIENT)
Age: 75
End: 2025-03-10

## 2025-03-10 PROCEDURE — 93298 REM INTERROG DEV EVAL SCRMS: CPT

## 2025-04-14 ENCOUNTER — NON-APPOINTMENT (OUTPATIENT)
Age: 75
End: 2025-04-14

## 2025-04-14 ENCOUNTER — APPOINTMENT (OUTPATIENT)
Dept: CARDIOLOGY | Facility: CLINIC | Age: 75
End: 2025-04-14
Payer: MEDICARE

## 2025-04-14 PROCEDURE — 93298 REM INTERROG DEV EVAL SCRMS: CPT

## 2025-05-19 ENCOUNTER — APPOINTMENT (OUTPATIENT)
Dept: CARDIOLOGY | Facility: CLINIC | Age: 75
End: 2025-05-19
Payer: MEDICARE

## 2025-05-19 ENCOUNTER — NON-APPOINTMENT (OUTPATIENT)
Age: 75
End: 2025-05-19

## 2025-05-19 PROCEDURE — 93298 REM INTERROG DEV EVAL SCRMS: CPT

## 2025-06-23 ENCOUNTER — NON-APPOINTMENT (OUTPATIENT)
Age: 75
End: 2025-06-23

## 2025-06-23 ENCOUNTER — APPOINTMENT (OUTPATIENT)
Dept: CARDIOLOGY | Facility: CLINIC | Age: 75
End: 2025-06-23

## 2025-06-23 PROCEDURE — 93298 REM INTERROG DEV EVAL SCRMS: CPT

## 2025-07-28 ENCOUNTER — NON-APPOINTMENT (OUTPATIENT)
Age: 75
End: 2025-07-28

## 2025-07-28 ENCOUNTER — APPOINTMENT (OUTPATIENT)
Dept: CARDIOLOGY | Facility: CLINIC | Age: 75
End: 2025-07-28
Payer: MEDICARE

## 2025-07-28 PROCEDURE — 93298 REM INTERROG DEV EVAL SCRMS: CPT

## 2025-08-29 ENCOUNTER — APPOINTMENT (OUTPATIENT)
Dept: CARDIOLOGY | Facility: CLINIC | Age: 75
End: 2025-08-29

## 2025-08-29 ENCOUNTER — NON-APPOINTMENT (OUTPATIENT)
Age: 75
End: 2025-08-29

## 2025-08-29 PROCEDURE — 93298 REM INTERROG DEV EVAL SCRMS: CPT
